# Patient Record
Sex: MALE | Race: WHITE | NOT HISPANIC OR LATINO | Employment: OTHER | URBAN - METROPOLITAN AREA
[De-identification: names, ages, dates, MRNs, and addresses within clinical notes are randomized per-mention and may not be internally consistent; named-entity substitution may affect disease eponyms.]

---

## 2019-05-16 ENCOUNTER — APPOINTMENT (EMERGENCY)
Dept: RADIOLOGY | Facility: HOSPITAL | Age: 64
DRG: 247 | End: 2019-05-16
Payer: COMMERCIAL

## 2019-05-16 ENCOUNTER — HOSPITAL ENCOUNTER (INPATIENT)
Facility: HOSPITAL | Age: 64
LOS: 2 days | Discharge: HOME | DRG: 247 | End: 2019-05-19
Attending: EMERGENCY MEDICINE | Admitting: ANESTHESIOLOGY
Payer: COMMERCIAL

## 2019-05-16 ENCOUNTER — APPOINTMENT (EMERGENCY)
Dept: INTERVENTIONAL RADIOLOGY/VASCULAR | Facility: HOSPITAL | Age: 64
DRG: 247 | End: 2019-05-16
Attending: EMERGENCY MEDICINE
Payer: COMMERCIAL

## 2019-05-16 DIAGNOSIS — I21.11 ST ELEVATION MYOCARDIAL INFARCTION INVOLVING RIGHT CORONARY ARTERY (HCC): ICD-10-CM

## 2019-05-16 DIAGNOSIS — I21.3 STEMI (ST ELEVATION MYOCARDIAL INFARCTION) (HCC): Primary | ICD-10-CM

## 2019-05-16 LAB
ALBUMIN SERPL BCP-MCNC: 4.5 G/DL (ref 3.5–5)
ALP SERPL-CCNC: 78 U/L (ref 46–116)
ALT SERPL W P-5'-P-CCNC: 66 U/L (ref 12–78)
ANION GAP SERPL CALCULATED.3IONS-SCNC: 7 MMOL/L (ref 4–13)
ANION GAP SERPL CALCULATED.3IONS-SCNC: 8 MMOL/L (ref 4–13)
APTT PPP: 30 SECONDS (ref 26–38)
AST SERPL W P-5'-P-CCNC: 45 U/L (ref 5–45)
ATRIAL RATE: 75 BPM
BASOPHILS # BLD AUTO: 0.04 THOUSANDS/ΜL (ref 0–0.1)
BASOPHILS NFR BLD AUTO: 0 % (ref 0–1)
BILIRUB DIRECT SERPL-MCNC: 0.02 MG/DL (ref 0–0.2)
BILIRUB SERPL-MCNC: 0.5 MG/DL (ref 0.2–1)
BUN SERPL-MCNC: 19 MG/DL (ref 5–25)
BUN SERPL-MCNC: 24 MG/DL (ref 5–25)
CALCIUM SERPL-MCNC: 11.1 MG/DL (ref 8.3–10.1)
CALCIUM SERPL-MCNC: 9.7 MG/DL (ref 8.3–10.1)
CHLORIDE SERPL-SCNC: 104 MMOL/L (ref 100–108)
CHLORIDE SERPL-SCNC: 109 MMOL/L (ref 100–108)
CO2 SERPL-SCNC: 27 MMOL/L (ref 21–32)
CO2 SERPL-SCNC: 31 MMOL/L (ref 21–32)
CREAT SERPL-MCNC: 1.12 MG/DL (ref 0.6–1.3)
CREAT SERPL-MCNC: 1.4 MG/DL (ref 0.6–1.3)
EOSINOPHIL # BLD AUTO: 0.08 THOUSAND/ΜL (ref 0–0.61)
EOSINOPHIL NFR BLD AUTO: 1 % (ref 0–6)
ERYTHROCYTE [DISTWIDTH] IN BLOOD BY AUTOMATED COUNT: 13.2 % (ref 11.6–15.1)
GFR SERPL CREATININE-BSD FRML MDRD: 52 ML/MIN/1.73SQ M
GFR SERPL CREATININE-BSD FRML MDRD: 69 ML/MIN/1.73SQ M
GLUCOSE P FAST SERPL-MCNC: 106 MG/DL (ref 65–99)
GLUCOSE SERPL-MCNC: 106 MG/DL (ref 65–140)
GLUCOSE SERPL-MCNC: 133 MG/DL (ref 65–140)
HCT VFR BLD AUTO: 47.8 % (ref 36.5–49.3)
HGB BLD-MCNC: 15.9 G/DL (ref 12–17)
IMM GRANULOCYTES # BLD AUTO: 0.05 THOUSAND/UL (ref 0–0.2)
IMM GRANULOCYTES NFR BLD AUTO: 1 % (ref 0–2)
INR PPP: 0.92 (ref 0.86–1.17)
KCT BLD-ACNC: 195 SEC (ref 89–137)
KCT BLD-ACNC: 248 SEC (ref 89–137)
LYMPHOCYTES # BLD AUTO: 1.55 THOUSANDS/ΜL (ref 0.6–4.47)
LYMPHOCYTES NFR BLD AUTO: 14 % (ref 14–44)
MAGNESIUM SERPL-MCNC: 2.1 MG/DL (ref 1.6–2.6)
MCH RBC QN AUTO: 31.9 PG (ref 26.8–34.3)
MCHC RBC AUTO-ENTMCNC: 33.3 G/DL (ref 31.4–37.4)
MCV RBC AUTO: 96 FL (ref 82–98)
MONOCYTES # BLD AUTO: 0.8 THOUSAND/ΜL (ref 0.17–1.22)
MONOCYTES NFR BLD AUTO: 7 % (ref 4–12)
NEUTROPHILS # BLD AUTO: 8.4 THOUSANDS/ΜL (ref 1.85–7.62)
NEUTS SEG NFR BLD AUTO: 77 % (ref 43–75)
NRBC BLD AUTO-RTO: 0 /100 WBCS
P AXIS: 59 DEGREES
PLATELET # BLD AUTO: 235 THOUSANDS/UL (ref 149–390)
PLATELET # BLD AUTO: 304 THOUSANDS/UL (ref 149–390)
PMV BLD AUTO: 8.7 FL (ref 8.9–12.7)
PMV BLD AUTO: 8.8 FL (ref 8.9–12.7)
POTASSIUM SERPL-SCNC: 4.4 MMOL/L (ref 3.5–5.3)
POTASSIUM SERPL-SCNC: 6 MMOL/L (ref 3.5–5.3)
PR INTERVAL: 152 MS
PROT SERPL-MCNC: 8.3 G/DL (ref 6.4–8.2)
PROTHROMBIN TIME: 12.3 SECONDS (ref 11.8–14.2)
QRS AXIS: 96 DEGREES
QRSD INTERVAL: 86 MS
QT INTERVAL: 334 MS
QTC INTERVAL: 372 MS
RBC # BLD AUTO: 4.98 MILLION/UL (ref 3.88–5.62)
SODIUM SERPL-SCNC: 143 MMOL/L (ref 136–145)
SODIUM SERPL-SCNC: 143 MMOL/L (ref 136–145)
SPECIMEN SOURCE: ABNORMAL
SPECIMEN SOURCE: ABNORMAL
T WAVE AXIS: 97 DEGREES
TROPONIN I SERPL-MCNC: 0.25 NG/ML
VENTRICULAR RATE: 75 BPM
WBC # BLD AUTO: 10.92 THOUSAND/UL (ref 4.31–10.16)

## 2019-05-16 PROCEDURE — 96372 THER/PROPH/DIAG INJ SC/IM: CPT

## 2019-05-16 PROCEDURE — C1769 GUIDE WIRE: HCPCS | Performed by: EMERGENCY MEDICINE

## 2019-05-16 PROCEDURE — C1874 STENT, COATED/COV W/DEL SYS: HCPCS

## 2019-05-16 PROCEDURE — B211YZZ FLUOROSCOPY OF MULTIPLE CORONARY ARTERIES USING OTHER CONTRAST: ICD-10-PCS | Performed by: INTERNAL MEDICINE

## 2019-05-16 PROCEDURE — 85049 AUTOMATED PLATELET COUNT: CPT | Performed by: NURSE PRACTITIONER

## 2019-05-16 PROCEDURE — 93458 L HRT ARTERY/VENTRICLE ANGIO: CPT | Performed by: INTERNAL MEDICINE

## 2019-05-16 PROCEDURE — 80048 BASIC METABOLIC PNL TOTAL CA: CPT | Performed by: EMERGENCY MEDICINE

## 2019-05-16 PROCEDURE — 85610 PROTHROMBIN TIME: CPT | Performed by: EMERGENCY MEDICINE

## 2019-05-16 PROCEDURE — 93010 ELECTROCARDIOGRAM REPORT: CPT | Performed by: INTERNAL MEDICINE

## 2019-05-16 PROCEDURE — 99213 OFFICE O/P EST LOW 20 MIN: CPT | Performed by: ANESTHESIOLOGY

## 2019-05-16 PROCEDURE — 027034Z DILATION OF CORONARY ARTERY, ONE ARTERY WITH DRUG-ELUTING INTRALUMINAL DEVICE, PERCUTANEOUS APPROACH: ICD-10-PCS | Performed by: INTERNAL MEDICINE

## 2019-05-16 PROCEDURE — C1894 INTRO/SHEATH, NON-LASER: HCPCS | Performed by: EMERGENCY MEDICINE

## 2019-05-16 PROCEDURE — 93458 L HRT ARTERY/VENTRICLE ANGIO: CPT | Performed by: EMERGENCY MEDICINE

## 2019-05-16 PROCEDURE — C9606 PERC D-E COR REVASC W AMI S: HCPCS | Performed by: EMERGENCY MEDICINE

## 2019-05-16 PROCEDURE — C1725 CATH, TRANSLUMIN NON-LASER: HCPCS | Performed by: EMERGENCY MEDICINE

## 2019-05-16 PROCEDURE — 99285 EMERGENCY DEPT VISIT HI MDM: CPT | Performed by: EMERGENCY MEDICINE

## 2019-05-16 PROCEDURE — 85025 COMPLETE CBC W/AUTO DIFF WBC: CPT | Performed by: EMERGENCY MEDICINE

## 2019-05-16 PROCEDURE — 93005 ELECTROCARDIOGRAM TRACING: CPT

## 2019-05-16 PROCEDURE — 99222 1ST HOSP IP/OBS MODERATE 55: CPT | Performed by: INTERNAL MEDICINE

## 2019-05-16 PROCEDURE — 83735 ASSAY OF MAGNESIUM: CPT | Performed by: NURSE PRACTITIONER

## 2019-05-16 PROCEDURE — 84484 ASSAY OF TROPONIN QUANT: CPT | Performed by: EMERGENCY MEDICINE

## 2019-05-16 PROCEDURE — 99285 EMERGENCY DEPT VISIT HI MDM: CPT

## 2019-05-16 PROCEDURE — 36415 COLL VENOUS BLD VENIPUNCTURE: CPT | Performed by: EMERGENCY MEDICINE

## 2019-05-16 PROCEDURE — C1887 CATHETER, GUIDING: HCPCS | Performed by: EMERGENCY MEDICINE

## 2019-05-16 PROCEDURE — 4A023N7 MEASUREMENT OF CARDIAC SAMPLING AND PRESSURE, LEFT HEART, PERCUTANEOUS APPROACH: ICD-10-PCS | Performed by: INTERNAL MEDICINE

## 2019-05-16 PROCEDURE — 80048 BASIC METABOLIC PNL TOTAL CA: CPT | Performed by: NURSE PRACTITIONER

## 2019-05-16 PROCEDURE — 92941 PRQ TRLML REVSC TOT OCCL AMI: CPT | Performed by: INTERNAL MEDICINE

## 2019-05-16 PROCEDURE — 80076 HEPATIC FUNCTION PANEL: CPT | Performed by: EMERGENCY MEDICINE

## 2019-05-16 PROCEDURE — 85347 COAGULATION TIME ACTIVATED: CPT

## 2019-05-16 PROCEDURE — 85730 THROMBOPLASTIN TIME PARTIAL: CPT | Performed by: EMERGENCY MEDICINE

## 2019-05-16 RX ORDER — HEPARIN SODIUM 5000 [USP'U]/ML
5000 INJECTION, SOLUTION INTRAVENOUS; SUBCUTANEOUS ONCE
Status: COMPLETED | OUTPATIENT
Start: 2019-05-16 | End: 2019-05-16

## 2019-05-16 RX ORDER — ACETAMINOPHEN 325 MG/1
650 TABLET ORAL EVERY 4 HOURS PRN
Status: DISCONTINUED | OUTPATIENT
Start: 2019-05-16 | End: 2019-05-19 | Stop reason: HOSPADM

## 2019-05-16 RX ORDER — HEPARIN SODIUM 1000 [USP'U]/ML
INJECTION, SOLUTION INTRAVENOUS; SUBCUTANEOUS CODE/TRAUMA/SEDATION MEDICATION
Status: COMPLETED | OUTPATIENT
Start: 2019-05-16 | End: 2019-05-16

## 2019-05-16 RX ORDER — ONDANSETRON 2 MG/ML
4 INJECTION INTRAMUSCULAR; INTRAVENOUS EVERY 6 HOURS PRN
Status: DISCONTINUED | OUTPATIENT
Start: 2019-05-16 | End: 2019-05-19 | Stop reason: HOSPADM

## 2019-05-16 RX ORDER — SODIUM CHLORIDE 9 MG/ML
125 INJECTION, SOLUTION INTRAVENOUS CONTINUOUS
Status: DISPENSED | OUTPATIENT
Start: 2019-05-16 | End: 2019-05-16

## 2019-05-16 RX ORDER — LIDOCAINE HYDROCHLORIDE 10 MG/ML
INJECTION, SOLUTION INFILTRATION; PERINEURAL CODE/TRAUMA/SEDATION MEDICATION
Status: COMPLETED | OUTPATIENT
Start: 2019-05-16 | End: 2019-05-16

## 2019-05-16 RX ORDER — ATORVASTATIN CALCIUM 40 MG/1
80 TABLET, FILM COATED ORAL EVERY EVENING
Status: DISCONTINUED | OUTPATIENT
Start: 2019-05-16 | End: 2019-05-19

## 2019-05-16 RX ORDER — NITROGLYCERIN 20 MG/100ML
INJECTION INTRAVENOUS CODE/TRAUMA/SEDATION MEDICATION
Status: COMPLETED | OUTPATIENT
Start: 2019-05-16 | End: 2019-05-16

## 2019-05-16 RX ORDER — MAGNESIUM HYDROXIDE/ALUMINUM HYDROXICE/SIMETHICONE 120; 1200; 1200 MG/30ML; MG/30ML; MG/30ML
30 SUSPENSION ORAL EVERY 6 HOURS PRN
Status: DISCONTINUED | OUTPATIENT
Start: 2019-05-16 | End: 2019-05-19 | Stop reason: HOSPADM

## 2019-05-16 RX ORDER — CHLORHEXIDINE GLUCONATE 0.12 MG/ML
15 RINSE ORAL EVERY 12 HOURS SCHEDULED
Status: DISCONTINUED | OUTPATIENT
Start: 2019-05-16 | End: 2019-05-19 | Stop reason: HOSPADM

## 2019-05-16 RX ORDER — DOCUSATE SODIUM 100 MG/1
100 CAPSULE, LIQUID FILLED ORAL 2 TIMES DAILY
Status: DISCONTINUED | OUTPATIENT
Start: 2019-05-16 | End: 2019-05-19 | Stop reason: HOSPADM

## 2019-05-16 RX ORDER — VERAPAMIL HCL 2.5 MG/ML
AMPUL (ML) INTRAVENOUS CODE/TRAUMA/SEDATION MEDICATION
Status: COMPLETED | OUTPATIENT
Start: 2019-05-16 | End: 2019-05-16

## 2019-05-16 RX ORDER — PANTOPRAZOLE SODIUM 40 MG/1
40 TABLET, DELAYED RELEASE ORAL
Status: DISCONTINUED | OUTPATIENT
Start: 2019-05-17 | End: 2019-05-19 | Stop reason: HOSPADM

## 2019-05-16 RX ORDER — ASPIRIN 81 MG/1
81 TABLET, CHEWABLE ORAL DAILY
Status: DISCONTINUED | OUTPATIENT
Start: 2019-05-17 | End: 2019-05-16

## 2019-05-16 RX ORDER — ASPIRIN 81 MG/1
81 TABLET ORAL DAILY
Status: DISCONTINUED | OUTPATIENT
Start: 2019-05-17 | End: 2019-05-19 | Stop reason: HOSPADM

## 2019-05-16 RX ORDER — SIMETHICONE 80 MG
80 TABLET,CHEWABLE ORAL 4 TIMES DAILY PRN
Status: DISCONTINUED | OUTPATIENT
Start: 2019-05-16 | End: 2019-05-19 | Stop reason: HOSPADM

## 2019-05-16 RX ORDER — ATROPINE SULFATE 0.1 MG/ML
INJECTION, SOLUTION ENDOTRACHEAL; INTRAMUSCULAR; INTRAVENOUS; SUBCUTANEOUS CODE/TRAUMA/SEDATION MEDICATION
Status: COMPLETED | OUTPATIENT
Start: 2019-05-16 | End: 2019-05-16

## 2019-05-16 RX ORDER — HEPARIN SODIUM 5000 [USP'U]/ML
5000 INJECTION, SOLUTION INTRAVENOUS; SUBCUTANEOUS EVERY 8 HOURS SCHEDULED
Status: DISCONTINUED | OUTPATIENT
Start: 2019-05-16 | End: 2019-05-16

## 2019-05-16 RX ADMIN — NITROGLYCERIN 200 MCG: 20 INJECTION INTRAVENOUS at 15:30

## 2019-05-16 RX ADMIN — LIDOCAINE HYDROCHLORIDE 2 ML: 10 INJECTION, SOLUTION INFILTRATION; PERINEURAL at 15:27

## 2019-05-16 RX ADMIN — METOPROLOL TARTRATE 25 MG: 25 TABLET ORAL at 21:56

## 2019-05-16 RX ADMIN — ATORVASTATIN CALCIUM 80 MG: 40 TABLET, FILM COATED ORAL at 21:58

## 2019-05-16 RX ADMIN — IODIXANOL 100 ML: 320 INJECTION, SOLUTION INTRAVASCULAR at 15:59

## 2019-05-16 RX ADMIN — DOCUSATE SODIUM 100 MG: 100 CAPSULE, LIQUID FILLED ORAL at 21:58

## 2019-05-16 RX ADMIN — HEPARIN SODIUM 2000 UNITS: 1000 INJECTION INTRAVENOUS; SUBCUTANEOUS at 15:53

## 2019-05-16 RX ADMIN — TICAGRELOR 180 MG: 90 TABLET ORAL at 15:11

## 2019-05-16 RX ADMIN — HEPARIN SODIUM 5000 UNITS: 5000 INJECTION INTRAVENOUS; SUBCUTANEOUS at 15:15

## 2019-05-16 RX ADMIN — CHLORHEXIDINE GLUCONATE 0.12% ORAL RINSE 15 ML: 1.2 LIQUID ORAL at 21:57

## 2019-05-16 RX ADMIN — VERAPAMIL HYDROCHLORIDE 2.5 MG: 2.5 INJECTION, SOLUTION INTRAVENOUS at 15:30

## 2019-05-16 RX ADMIN — ATROPINE SULFATE 0.5 MG: 0.1 INJECTION, SOLUTION ENDOTRACHEAL; INTRAMUSCULAR; INTRAVENOUS; SUBCUTANEOUS at 15:36

## 2019-05-16 RX ADMIN — HEPARIN SODIUM 6000 UNITS: 1000 INJECTION INTRAVENOUS; SUBCUTANEOUS at 15:34

## 2019-05-17 ENCOUNTER — APPOINTMENT (OUTPATIENT)
Dept: NON INVASIVE DIAGNOSTICS | Facility: HOSPITAL | Age: 64
DRG: 247 | End: 2019-05-17
Payer: COMMERCIAL

## 2019-05-17 LAB
ALBUMIN SERPL BCP-MCNC: 4 G/DL (ref 3.5–5)
ALP SERPL-CCNC: 76 U/L (ref 46–116)
ALT SERPL W P-5'-P-CCNC: 72 U/L (ref 12–78)
ANION GAP SERPL CALCULATED.3IONS-SCNC: 11 MMOL/L (ref 4–13)
AST SERPL W P-5'-P-CCNC: 147 U/L (ref 5–45)
ATRIAL RATE: 56 BPM
BASOPHILS # BLD AUTO: 0.05 THOUSANDS/ΜL (ref 0–0.1)
BASOPHILS NFR BLD AUTO: 1 % (ref 0–1)
BILIRUB SERPL-MCNC: 0.8 MG/DL (ref 0.2–1)
BUN SERPL-MCNC: 16 MG/DL (ref 5–25)
CALCIUM SERPL-MCNC: 9.5 MG/DL (ref 8.3–10.1)
CHLORIDE SERPL-SCNC: 105 MMOL/L (ref 100–108)
CO2 SERPL-SCNC: 26 MMOL/L (ref 21–32)
CREAT SERPL-MCNC: 1.24 MG/DL (ref 0.6–1.3)
EOSINOPHIL # BLD AUTO: 0.11 THOUSAND/ΜL (ref 0–0.61)
EOSINOPHIL NFR BLD AUTO: 1 % (ref 0–6)
ERYTHROCYTE [DISTWIDTH] IN BLOOD BY AUTOMATED COUNT: 13.5 % (ref 11.6–15.1)
GFR SERPL CREATININE-BSD FRML MDRD: 61 ML/MIN/1.73SQ M
GLUCOSE SERPL-MCNC: 102 MG/DL (ref 65–140)
HCT VFR BLD AUTO: 45.8 % (ref 36.5–49.3)
HGB BLD-MCNC: 15 G/DL (ref 12–17)
IMM GRANULOCYTES # BLD AUTO: 0.06 THOUSAND/UL (ref 0–0.2)
IMM GRANULOCYTES NFR BLD AUTO: 1 % (ref 0–2)
LYMPHOCYTES # BLD AUTO: 1.3 THOUSANDS/ΜL (ref 0.6–4.47)
LYMPHOCYTES NFR BLD AUTO: 12 % (ref 14–44)
MAGNESIUM SERPL-MCNC: 2.2 MG/DL (ref 1.6–2.6)
MCH RBC QN AUTO: 31.7 PG (ref 26.8–34.3)
MCHC RBC AUTO-ENTMCNC: 32.8 G/DL (ref 31.4–37.4)
MCV RBC AUTO: 97 FL (ref 82–98)
MONOCYTES # BLD AUTO: 1.03 THOUSAND/ΜL (ref 0.17–1.22)
MONOCYTES NFR BLD AUTO: 10 % (ref 4–12)
NEUTROPHILS # BLD AUTO: 8.25 THOUSANDS/ΜL (ref 1.85–7.62)
NEUTS SEG NFR BLD AUTO: 75 % (ref 43–75)
NRBC BLD AUTO-RTO: 0 /100 WBCS
P AXIS: 45 DEGREES
PHOSPHATE SERPL-MCNC: 2.7 MG/DL (ref 2.3–4.1)
PLATELET # BLD AUTO: 263 THOUSANDS/UL (ref 149–390)
PMV BLD AUTO: 8.9 FL (ref 8.9–12.7)
POTASSIUM SERPL-SCNC: 4.4 MMOL/L (ref 3.5–5.3)
PR INTERVAL: 160 MS
PROT SERPL-MCNC: 7.3 G/DL (ref 6.4–8.2)
QRS AXIS: 92 DEGREES
QRSD INTERVAL: 86 MS
QT INTERVAL: 416 MS
QTC INTERVAL: 401 MS
RBC # BLD AUTO: 4.73 MILLION/UL (ref 3.88–5.62)
SODIUM SERPL-SCNC: 142 MMOL/L (ref 136–145)
T WAVE AXIS: -23 DEGREES
VENTRICULAR RATE: 56 BPM
WBC # BLD AUTO: 10.8 THOUSAND/UL (ref 4.31–10.16)

## 2019-05-17 PROCEDURE — 93306 TTE W/DOPPLER COMPLETE: CPT | Performed by: INTERNAL MEDICINE

## 2019-05-17 PROCEDURE — G8979 MOBILITY GOAL STATUS: HCPCS

## 2019-05-17 PROCEDURE — 99232 SBSQ HOSP IP/OBS MODERATE 35: CPT | Performed by: INTERNAL MEDICINE

## 2019-05-17 PROCEDURE — 83735 ASSAY OF MAGNESIUM: CPT | Performed by: NURSE PRACTITIONER

## 2019-05-17 PROCEDURE — G8978 MOBILITY CURRENT STATUS: HCPCS

## 2019-05-17 PROCEDURE — 80053 COMPREHEN METABOLIC PANEL: CPT | Performed by: NURSE PRACTITIONER

## 2019-05-17 PROCEDURE — 93010 ELECTROCARDIOGRAM REPORT: CPT | Performed by: INTERNAL MEDICINE

## 2019-05-17 PROCEDURE — 93306 TTE W/DOPPLER COMPLETE: CPT

## 2019-05-17 PROCEDURE — 97161 PT EVAL LOW COMPLEX 20 MIN: CPT

## 2019-05-17 PROCEDURE — 85025 COMPLETE CBC W/AUTO DIFF WBC: CPT | Performed by: NURSE PRACTITIONER

## 2019-05-17 PROCEDURE — 84100 ASSAY OF PHOSPHORUS: CPT | Performed by: NURSE PRACTITIONER

## 2019-05-17 PROCEDURE — G8980 MOBILITY D/C STATUS: HCPCS

## 2019-05-17 RX ORDER — FUROSEMIDE 10 MG/ML
40 INJECTION INTRAMUSCULAR; INTRAVENOUS ONCE
Status: COMPLETED | OUTPATIENT
Start: 2019-05-17 | End: 2019-05-17

## 2019-05-17 RX ADMIN — CHLORHEXIDINE GLUCONATE 0.12% ORAL RINSE 15 ML: 1.2 LIQUID ORAL at 08:01

## 2019-05-17 RX ADMIN — METOPROLOL TARTRATE 25 MG: 25 TABLET ORAL at 21:13

## 2019-05-17 RX ADMIN — TICAGRELOR 90 MG: 90 TABLET ORAL at 17:12

## 2019-05-17 RX ADMIN — TICAGRELOR 90 MG: 90 TABLET ORAL at 08:01

## 2019-05-17 RX ADMIN — ASPIRIN 81 MG: 81 TABLET, COATED ORAL at 08:01

## 2019-05-17 RX ADMIN — FUROSEMIDE 40 MG: 10 INJECTION, SOLUTION INTRAMUSCULAR; INTRAVENOUS at 14:54

## 2019-05-17 RX ADMIN — METOPROLOL TARTRATE 25 MG: 25 TABLET ORAL at 08:01

## 2019-05-17 RX ADMIN — PANTOPRAZOLE SODIUM 40 MG: 40 TABLET, DELAYED RELEASE ORAL at 06:26

## 2019-05-17 RX ADMIN — ATORVASTATIN CALCIUM 80 MG: 40 TABLET, FILM COATED ORAL at 17:12

## 2019-05-17 RX ADMIN — CHLORHEXIDINE GLUCONATE 0.12% ORAL RINSE 15 ML: 1.2 LIQUID ORAL at 21:13

## 2019-05-18 PROBLEM — E78.2 MIXED HYPERLIPIDEMIA: Status: ACTIVE | Noted: 2019-05-18

## 2019-05-18 PROBLEM — Z72.0 TOBACCO USE: Status: ACTIVE | Noted: 2019-05-18

## 2019-05-18 PROBLEM — R79.89 ELEVATED SERUM CREATININE: Status: ACTIVE | Noted: 2019-05-18

## 2019-05-18 PROBLEM — R74.01 TRANSAMINITIS: Status: ACTIVE | Noted: 2019-05-18

## 2019-05-18 PROBLEM — I21.11 ST ELEVATION MYOCARDIAL INFARCTION INVOLVING RIGHT CORONARY ARTERY (HCC): Status: ACTIVE | Noted: 2019-05-18

## 2019-05-18 LAB
ANION GAP SERPL CALCULATED.3IONS-SCNC: 9 MMOL/L (ref 4–13)
BASOPHILS # BLD AUTO: 0.03 THOUSANDS/ΜL (ref 0–0.1)
BASOPHILS NFR BLD AUTO: 0 % (ref 0–1)
BUN SERPL-MCNC: 21 MG/DL (ref 5–25)
CALCIUM SERPL-MCNC: 9.2 MG/DL (ref 8.3–10.1)
CHLORIDE SERPL-SCNC: 104 MMOL/L (ref 100–108)
CHOLEST SERPL-MCNC: 157 MG/DL (ref 50–200)
CO2 SERPL-SCNC: 28 MMOL/L (ref 21–32)
CREAT SERPL-MCNC: 1.35 MG/DL (ref 0.6–1.3)
EOSINOPHIL # BLD AUTO: 0.16 THOUSAND/ΜL (ref 0–0.61)
EOSINOPHIL NFR BLD AUTO: 2 % (ref 0–6)
ERYTHROCYTE [DISTWIDTH] IN BLOOD BY AUTOMATED COUNT: 13.4 % (ref 11.6–15.1)
GFR SERPL CREATININE-BSD FRML MDRD: 55 ML/MIN/1.73SQ M
GLUCOSE SERPL-MCNC: 101 MG/DL (ref 65–140)
HCT VFR BLD AUTO: 45 % (ref 36.5–49.3)
HDLC SERPL-MCNC: 30 MG/DL (ref 40–60)
HGB BLD-MCNC: 14.8 G/DL (ref 12–17)
IMM GRANULOCYTES # BLD AUTO: 0.05 THOUSAND/UL (ref 0–0.2)
IMM GRANULOCYTES NFR BLD AUTO: 1 % (ref 0–2)
LDLC SERPL CALC-MCNC: 96 MG/DL (ref 0–100)
LYMPHOCYTES # BLD AUTO: 1.46 THOUSANDS/ΜL (ref 0.6–4.47)
LYMPHOCYTES NFR BLD AUTO: 16 % (ref 14–44)
MAGNESIUM SERPL-MCNC: 2.4 MG/DL (ref 1.6–2.6)
MCH RBC QN AUTO: 31.8 PG (ref 26.8–34.3)
MCHC RBC AUTO-ENTMCNC: 32.9 G/DL (ref 31.4–37.4)
MCV RBC AUTO: 97 FL (ref 82–98)
MONOCYTES # BLD AUTO: 1.17 THOUSAND/ΜL (ref 0.17–1.22)
MONOCYTES NFR BLD AUTO: 13 % (ref 4–12)
NEUTROPHILS # BLD AUTO: 6.16 THOUSANDS/ΜL (ref 1.85–7.62)
NEUTS SEG NFR BLD AUTO: 68 % (ref 43–75)
NONHDLC SERPL-MCNC: 127 MG/DL
NRBC BLD AUTO-RTO: 0 /100 WBCS
PLATELET # BLD AUTO: 250 THOUSANDS/UL (ref 149–390)
PMV BLD AUTO: 8.9 FL (ref 8.9–12.7)
POTASSIUM SERPL-SCNC: 4.2 MMOL/L (ref 3.5–5.3)
RBC # BLD AUTO: 4.66 MILLION/UL (ref 3.88–5.62)
SODIUM SERPL-SCNC: 141 MMOL/L (ref 136–145)
TRIGL SERPL-MCNC: 156 MG/DL
WBC # BLD AUTO: 9.03 THOUSAND/UL (ref 4.31–10.16)

## 2019-05-18 PROCEDURE — 80048 BASIC METABOLIC PNL TOTAL CA: CPT | Performed by: PHYSICIAN ASSISTANT

## 2019-05-18 PROCEDURE — 87340 HEPATITIS B SURFACE AG IA: CPT | Performed by: FAMILY MEDICINE

## 2019-05-18 PROCEDURE — 85025 COMPLETE CBC W/AUTO DIFF WBC: CPT | Performed by: PHYSICIAN ASSISTANT

## 2019-05-18 PROCEDURE — 83735 ASSAY OF MAGNESIUM: CPT | Performed by: PHYSICIAN ASSISTANT

## 2019-05-18 PROCEDURE — 99232 SBSQ HOSP IP/OBS MODERATE 35: CPT | Performed by: FAMILY MEDICINE

## 2019-05-18 PROCEDURE — 86708 HEPATITIS A ANTIBODY: CPT | Performed by: FAMILY MEDICINE

## 2019-05-18 PROCEDURE — 80061 LIPID PANEL: CPT | Performed by: PHYSICIAN ASSISTANT

## 2019-05-18 PROCEDURE — 99232 SBSQ HOSP IP/OBS MODERATE 35: CPT | Performed by: INTERNAL MEDICINE

## 2019-05-18 PROCEDURE — 86803 HEPATITIS C AB TEST: CPT | Performed by: FAMILY MEDICINE

## 2019-05-18 RX ADMIN — TICAGRELOR 90 MG: 90 TABLET ORAL at 17:08

## 2019-05-18 RX ADMIN — CHLORHEXIDINE GLUCONATE 0.12% ORAL RINSE 15 ML: 1.2 LIQUID ORAL at 21:24

## 2019-05-18 RX ADMIN — METOPROLOL TARTRATE 25 MG: 25 TABLET ORAL at 21:24

## 2019-05-18 RX ADMIN — ASPIRIN 81 MG: 81 TABLET, COATED ORAL at 08:24

## 2019-05-18 RX ADMIN — PANTOPRAZOLE SODIUM 40 MG: 40 TABLET, DELAYED RELEASE ORAL at 05:24

## 2019-05-18 RX ADMIN — ATORVASTATIN CALCIUM 80 MG: 40 TABLET, FILM COATED ORAL at 17:08

## 2019-05-18 RX ADMIN — METOPROLOL TARTRATE 25 MG: 25 TABLET ORAL at 08:24

## 2019-05-18 RX ADMIN — TICAGRELOR 90 MG: 90 TABLET ORAL at 08:24

## 2019-05-19 VITALS
BODY MASS INDEX: 25.88 KG/M2 | WEIGHT: 180.78 LBS | HEART RATE: 63 BPM | DIASTOLIC BLOOD PRESSURE: 72 MMHG | RESPIRATION RATE: 18 BRPM | TEMPERATURE: 97.7 F | HEIGHT: 70 IN | OXYGEN SATURATION: 94 % | SYSTOLIC BLOOD PRESSURE: 110 MMHG

## 2019-05-19 PROBLEM — R79.89 ELEVATED SERUM CREATININE: Status: RESOLVED | Noted: 2019-05-18 | Resolved: 2019-05-19

## 2019-05-19 LAB
ALBUMIN SERPL BCP-MCNC: 3.8 G/DL (ref 3.5–5)
ALP SERPL-CCNC: 82 U/L (ref 46–116)
ALT SERPL W P-5'-P-CCNC: 48 U/L (ref 12–78)
ANION GAP SERPL CALCULATED.3IONS-SCNC: 7 MMOL/L (ref 4–13)
AST SERPL W P-5'-P-CCNC: 37 U/L (ref 5–45)
BASOPHILS # BLD AUTO: 0.04 THOUSANDS/ΜL (ref 0–0.1)
BASOPHILS NFR BLD AUTO: 1 % (ref 0–1)
BILIRUB SERPL-MCNC: 1 MG/DL (ref 0.2–1)
BUN SERPL-MCNC: 26 MG/DL (ref 5–25)
CALCIUM SERPL-MCNC: 9.1 MG/DL (ref 8.3–10.1)
CHLORIDE SERPL-SCNC: 103 MMOL/L (ref 100–108)
CO2 SERPL-SCNC: 29 MMOL/L (ref 21–32)
CREAT SERPL-MCNC: 1.28 MG/DL (ref 0.6–1.3)
EOSINOPHIL # BLD AUTO: 0.2 THOUSAND/ΜL (ref 0–0.61)
EOSINOPHIL NFR BLD AUTO: 2 % (ref 0–6)
ERYTHROCYTE [DISTWIDTH] IN BLOOD BY AUTOMATED COUNT: 13.1 % (ref 11.6–15.1)
EST. AVERAGE GLUCOSE BLD GHB EST-MCNC: 120 MG/DL
GFR SERPL CREATININE-BSD FRML MDRD: 58 ML/MIN/1.73SQ M
GLUCOSE SERPL-MCNC: 108 MG/DL (ref 65–140)
HAV AB SER QL IA: NORMAL
HBA1C MFR BLD: 5.8 % (ref 4.2–6.3)
HBV SURFACE AG SER QL: NORMAL
HCT VFR BLD AUTO: 44.9 % (ref 36.5–49.3)
HCV AB SER QL: NORMAL
HGB BLD-MCNC: 15.1 G/DL (ref 12–17)
IMM GRANULOCYTES # BLD AUTO: 0.02 THOUSAND/UL (ref 0–0.2)
IMM GRANULOCYTES NFR BLD AUTO: 0 % (ref 0–2)
LYMPHOCYTES # BLD AUTO: 1.51 THOUSANDS/ΜL (ref 0.6–4.47)
LYMPHOCYTES NFR BLD AUTO: 18 % (ref 14–44)
MCH RBC QN AUTO: 32.1 PG (ref 26.8–34.3)
MCHC RBC AUTO-ENTMCNC: 33.6 G/DL (ref 31.4–37.4)
MCV RBC AUTO: 96 FL (ref 82–98)
MONOCYTES # BLD AUTO: 1.13 THOUSAND/ΜL (ref 0.17–1.22)
MONOCYTES NFR BLD AUTO: 14 % (ref 4–12)
NEUTROPHILS # BLD AUTO: 5.48 THOUSANDS/ΜL (ref 1.85–7.62)
NEUTS SEG NFR BLD AUTO: 65 % (ref 43–75)
NRBC BLD AUTO-RTO: 0 /100 WBCS
PLATELET # BLD AUTO: 249 THOUSANDS/UL (ref 149–390)
PMV BLD AUTO: 8.8 FL (ref 8.9–12.7)
POTASSIUM SERPL-SCNC: 3.9 MMOL/L (ref 3.5–5.3)
PROT SERPL-MCNC: 7.2 G/DL (ref 6.4–8.2)
RBC # BLD AUTO: 4.7 MILLION/UL (ref 3.88–5.62)
SODIUM SERPL-SCNC: 139 MMOL/L (ref 136–145)
WBC # BLD AUTO: 8.38 THOUSAND/UL (ref 4.31–10.16)

## 2019-05-19 PROCEDURE — 80053 COMPREHEN METABOLIC PANEL: CPT | Performed by: FAMILY MEDICINE

## 2019-05-19 PROCEDURE — 99232 SBSQ HOSP IP/OBS MODERATE 35: CPT | Performed by: INTERNAL MEDICINE

## 2019-05-19 PROCEDURE — 85025 COMPLETE CBC W/AUTO DIFF WBC: CPT | Performed by: FAMILY MEDICINE

## 2019-05-19 PROCEDURE — 99239 HOSP IP/OBS DSCHRG MGMT >30: CPT | Performed by: FAMILY MEDICINE

## 2019-05-19 PROCEDURE — 83036 HEMOGLOBIN GLYCOSYLATED A1C: CPT | Performed by: PHYSICIAN ASSISTANT

## 2019-05-19 RX ORDER — ASPIRIN 81 MG/1
81 TABLET ORAL DAILY
Qty: 30 TABLET | Refills: 0 | Status: SHIPPED | OUTPATIENT
Start: 2019-05-20

## 2019-05-19 RX ORDER — ATORVASTATIN CALCIUM 20 MG/1
20 TABLET, FILM COATED ORAL EVERY EVENING
Status: DISCONTINUED | OUTPATIENT
Start: 2019-05-19 | End: 2019-05-19 | Stop reason: HOSPADM

## 2019-05-19 RX ORDER — ATORVASTATIN CALCIUM 20 MG/1
20 TABLET, FILM COATED ORAL EVERY EVENING
Qty: 30 TABLET | Refills: 0 | Status: SHIPPED | OUTPATIENT
Start: 2019-05-19 | End: 2019-05-23 | Stop reason: SDUPTHER

## 2019-05-19 RX ADMIN — CHLORHEXIDINE GLUCONATE 0.12% ORAL RINSE 15 ML: 1.2 LIQUID ORAL at 08:50

## 2019-05-19 RX ADMIN — ASPIRIN 81 MG: 81 TABLET, COATED ORAL at 08:50

## 2019-05-19 RX ADMIN — METOPROLOL TARTRATE 25 MG: 25 TABLET ORAL at 08:50

## 2019-05-19 RX ADMIN — TICAGRELOR 90 MG: 90 TABLET ORAL at 08:50

## 2019-05-19 RX ADMIN — PANTOPRAZOLE SODIUM 40 MG: 40 TABLET, DELAYED RELEASE ORAL at 05:51

## 2019-05-21 ENCOUNTER — TELEPHONE (OUTPATIENT)
Dept: CARDIOLOGY CLINIC | Facility: CLINIC | Age: 64
End: 2019-05-21

## 2019-05-23 ENCOUNTER — OFFICE VISIT (OUTPATIENT)
Dept: CARDIOLOGY CLINIC | Facility: CLINIC | Age: 64
End: 2019-05-23
Payer: COMMERCIAL

## 2019-05-23 VITALS
BODY MASS INDEX: 26.63 KG/M2 | OXYGEN SATURATION: 98 % | HEIGHT: 70 IN | SYSTOLIC BLOOD PRESSURE: 128 MMHG | DIASTOLIC BLOOD PRESSURE: 80 MMHG | WEIGHT: 186 LBS | HEART RATE: 71 BPM

## 2019-05-23 DIAGNOSIS — I21.11 ST ELEVATION MYOCARDIAL INFARCTION INVOLVING RIGHT CORONARY ARTERY (HCC): Primary | ICD-10-CM

## 2019-05-23 DIAGNOSIS — Z72.0 TOBACCO USE: ICD-10-CM

## 2019-05-23 DIAGNOSIS — E78.2 MIXED HYPERLIPIDEMIA: ICD-10-CM

## 2019-05-23 PROCEDURE — 99213 OFFICE O/P EST LOW 20 MIN: CPT | Performed by: INTERNAL MEDICINE

## 2019-05-23 RX ORDER — ATORVASTATIN CALCIUM 40 MG/1
40 TABLET, FILM COATED ORAL EVERY EVENING
Qty: 90 TABLET | Refills: 3 | Status: SHIPPED | OUTPATIENT
Start: 2019-05-23 | End: 2020-05-27 | Stop reason: SDUPTHER

## 2019-06-04 ENCOUNTER — TELEPHONE (OUTPATIENT)
Dept: CARDIOLOGY CLINIC | Facility: CLINIC | Age: 64
End: 2019-06-04

## 2019-06-04 NOTE — TELEPHONE ENCOUNTER
SYLVIA FROM [facility] IS REQUESTING PT OFFICE NOTES, MED LIST, LIPID PANELS  PLEASE FAX [phone]   James Burgess

## 2019-06-14 DIAGNOSIS — I21.11 ST ELEVATION MYOCARDIAL INFARCTION INVOLVING RIGHT CORONARY ARTERY (HCC): ICD-10-CM

## 2019-07-12 DIAGNOSIS — I21.11 ST ELEVATION MYOCARDIAL INFARCTION INVOLVING RIGHT CORONARY ARTERY (HCC): ICD-10-CM

## 2019-09-18 ENCOUNTER — TELEPHONE (OUTPATIENT)
Dept: CARDIOLOGY CLINIC | Facility: CLINIC | Age: 64
End: 2019-09-18

## 2019-09-18 NOTE — TELEPHONE ENCOUNTER
It is possible that it could be due to medications  We can discuss it at the next visit      [facility]

## 2019-09-18 NOTE — TELEPHONE ENCOUNTER
Spoke with patient advised the below  Verbally understands will get labs done and follow-up at appointment

## 2019-09-18 NOTE — TELEPHONE ENCOUNTER
S/w pt and informed him on BW ordered at last office visit to be done prior to appt  Pt verbally understood and stated that he has been experiencing fatigue  Pt would like to know if that could be a side effect from medications?

## 2019-10-04 ENCOUNTER — APPOINTMENT (OUTPATIENT)
Dept: LAB | Facility: CLINIC | Age: 64
End: 2019-10-04
Payer: COMMERCIAL

## 2019-10-04 DIAGNOSIS — I21.11 ST ELEVATION MYOCARDIAL INFARCTION INVOLVING RIGHT CORONARY ARTERY (HCC): ICD-10-CM

## 2019-10-04 LAB
ALBUMIN SERPL BCP-MCNC: 4.3 G/DL (ref 3.5–5)
ALP SERPL-CCNC: 102 U/L (ref 46–116)
ALT SERPL W P-5'-P-CCNC: 27 U/L (ref 12–78)
ANION GAP SERPL CALCULATED.3IONS-SCNC: 5 MMOL/L (ref 4–13)
AST SERPL W P-5'-P-CCNC: 13 U/L (ref 5–45)
BILIRUB SERPL-MCNC: 0.38 MG/DL (ref 0.2–1)
BUN SERPL-MCNC: 21 MG/DL (ref 5–25)
CALCIUM SERPL-MCNC: 9.3 MG/DL (ref 8.3–10.1)
CHLORIDE SERPL-SCNC: 110 MMOL/L (ref 100–108)
CO2 SERPL-SCNC: 25 MMOL/L (ref 21–32)
CREAT SERPL-MCNC: 1.27 MG/DL (ref 0.6–1.3)
GFR SERPL CREATININE-BSD FRML MDRD: 58 ML/MIN/1.73SQ M
GLUCOSE P FAST SERPL-MCNC: 107 MG/DL (ref 65–99)
POTASSIUM SERPL-SCNC: 4.6 MMOL/L (ref 3.5–5.3)
PROT SERPL-MCNC: 7.8 G/DL (ref 6.4–8.2)
SODIUM SERPL-SCNC: 140 MMOL/L (ref 136–145)

## 2019-10-04 PROCEDURE — 80053 COMPREHEN METABOLIC PANEL: CPT

## 2019-10-04 PROCEDURE — 36415 COLL VENOUS BLD VENIPUNCTURE: CPT

## 2019-10-07 ENCOUNTER — TELEPHONE (OUTPATIENT)
Dept: CARDIOLOGY CLINIC | Facility: CLINIC | Age: 64
End: 2019-10-07

## 2019-10-07 NOTE — TELEPHONE ENCOUNTER
----- Message from Twyla Roldan DO sent at 10/5/2019  4:46 PM EDT -----  Please call the patient and inform him that his laboratory work is normal     [address]    ----- Message -----  From: Lab, Background User  Sent: 10/4/2019  10:59 AM EDT  To: Twyla Roldan DO

## 2019-10-24 ENCOUNTER — OFFICE VISIT (OUTPATIENT)
Dept: CARDIOLOGY CLINIC | Facility: CLINIC | Age: 64
End: 2019-10-24
Payer: COMMERCIAL

## 2019-10-24 VITALS
HEART RATE: 87 BPM | BODY MASS INDEX: 27.63 KG/M2 | OXYGEN SATURATION: 97 % | HEIGHT: 70 IN | WEIGHT: 193 LBS | SYSTOLIC BLOOD PRESSURE: 134 MMHG | DIASTOLIC BLOOD PRESSURE: 80 MMHG

## 2019-10-24 DIAGNOSIS — R06.02 SHORTNESS OF BREATH: ICD-10-CM

## 2019-10-24 DIAGNOSIS — I73.9 CLAUDICATION (HCC): ICD-10-CM

## 2019-10-24 DIAGNOSIS — I21.11 ST ELEVATION MYOCARDIAL INFARCTION INVOLVING RIGHT CORONARY ARTERY (HCC): ICD-10-CM

## 2019-10-24 DIAGNOSIS — Z72.0 TOBACCO USE: Primary | ICD-10-CM

## 2019-10-24 PROCEDURE — 99213 OFFICE O/P EST LOW 20 MIN: CPT | Performed by: INTERNAL MEDICINE

## 2019-10-24 RX ORDER — FUROSEMIDE 20 MG/1
20 TABLET ORAL DAILY
Qty: 90 TABLET | Refills: 3 | Status: SHIPPED | OUTPATIENT
Start: 2019-10-24 | End: 2020-11-04 | Stop reason: SDUPTHER

## 2019-10-24 RX ORDER — POTASSIUM CHLORIDE 20 MEQ/1
20 TABLET, EXTENDED RELEASE ORAL DAILY
Qty: 90 TABLET | Refills: 3 | Status: SHIPPED | OUTPATIENT
Start: 2019-10-24 | End: 2020-11-04 | Stop reason: SDUPTHER

## 2019-10-24 NOTE — PATIENT INSTRUCTIONS
Please start taking furosemide (Lasix) 20 mg daily  Please start taking potassium chloride 20 mEq daily  These prescriptions have been sent to pharmacy  Please have your blood drawn in 1 week to monitor your kidney function and electrolytes as well as to check her cholesterol  An echocardiogram, pulmonary function testing and an exercise evaluation of the circulation of year legs has been ordered  You will be called to schedule these appointments  Please follow up in our office in 3 months, sooner if any acute issues arise

## 2019-10-24 NOTE — LETTER
October 24, 2019     Kiana Benjamin MD  [address] [facility] [address]    Patient: Yoshi Green   YOB: 1955   Date of Visit: 10/24/2019       Dear Dr Osmin Gerber:    Thank you for referring Yoshi Green to me for evaluation  Below are my notes for this consultation  If you have questions, please do not hesitate to call me  I look forward to following your patient along with you  Sincerely,        Kat Villa DO        CC: No Recipients  Kat Villa DO  10/24/2019 10:09 AM  Sign at close encounter                                             Cardiology Follow Up    Yoshi Green  1955  [id number]  [facility] Yoshi Remy    Dear Dr Rivka Coronado is a 58-year-old gentleman who has been referred to the [address] [place] [address] Place of Cardiology in [facility] with the following issues:     1  Inferior ST-elevation myocardial infarction, status post PCI to the proximal RCA (3 5 x 24 [employer] synergy drug-eluting stent) on 05/16/2018  2  Mild ischemic cardiomyopathy, preserved ejection fraction  3  History of tobacco abuse, quit    Interval History:  Since our last visit, Mr Brian Frances has developed some shortness of breath  He reports having to stop mowing his lawn so after several passes with his self-propelled   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where [employer] leaf blower, but as soon as he used the  to mow sleeves, he again felt shortness of breath  There appears to be no relationship to seasonal change  He owns an adjustable bed and does sleep significantly propped up  He denies any lower extremity edema  He does admit to some lower extremity heaviness and fatigue with walking  He does admit to some muscle cramps with walking  No chest pain, palpitations or angina similar to his ST-elevation presentation      Patient Active Problem List Diagnosis    ST elevation myocardial infarction involving right coronary artery (HCC)    Mixed hyperlipidemia    Tobacco use    Transaminitis     History reviewed  No pertinent past medical history  Social History     Socioeconomic History    Marital status: /Civil Union     Spouse name: Not on file    Number of children: Not on file    Years of education: Not on file    Highest education level: Not on file   Occupational History    Not on file   Social Needs    Financial resource strain: Not on file    Food insecurity:     Worry: Not on file     Inability: Not on file    Transportation needs:     Medical: Not on file     Non-medical: Not on file   Tobacco Use    Smoking status: Former Smoker     Last attempt to quit: 2019     Years since quittin 4    Smokeless tobacco: Never Used   Substance and Sexual Activity    Alcohol use: Never     Frequency: Never    Drug use: Never    Sexual activity: Never   Lifestyle    Physical activity:     Days per week: Not on file     Minutes per session: Not on file    Stress: Not on file   Relationships    Social connections:     Talks on phone: Not on file     Gets together: Not on file     Attends Ep[employer] service: Not on file     Active member of club or organization: Not on file     Attends meetings of clubs or organizations: Not on file     Relationship status: Not on file    Intimate partner violence:     Fear of current or ex partner: Not on file     Emotionally abused: Not on file     Physically abused: Not on file     Forced sexual activity: Not on file   Other Topics Concern    Not on file   Social History Narrative    Not on file      History reviewed  No pertinent family history  History reviewed  No pertinent surgical history      Current Outpatient Medications:     aspirin (ECOTRIN LOW STRENGTH) 81 mg EC tablet, Take 1 tablet (81 mg total) by mouth daily, Disp: 30 tablet, Rfl: 0    atorvastatin (LIPITOR) 40 mg tablet, Take 1 tablet (40 mg total) by mouth every evening, Disp: 90 tablet, Rfl: 3    metoprolol tartrate (LOPRESSOR) 25 mg tablet, Take 1 tablet (25 mg total) by mouth every 12 (twelve) hours, Disp: 180 tablet, Rfl: 3    Misc Natural Products (OSTEO BI-FLEX/5-LOXIN ADVANCED PO), Take by mouth, Disp: , Rfl:     ticagrelor (BRILINTA) 90 MG, Take 1 tablet (90 mg total) by mouth 2 (two) times a day, Disp: 180 tablet, Rfl: 3       No Known Allergies    Labs:  Results for Maycol Franz (MRN [id number]) as of 10/24/2019 10:04   10/4/2019 08:10   Sodium 140   Potassium 4 6   Chloride 110 (H)   CO2 25   Anion Gap 5   BUN 21   Creatinine 1 27   GLUCOSE FASTING 107 (H)   Calcium 9 3   AST 13   ALT 27   Alkaline Phosphatase 102   Total Protein 7 8   Albumin 4 3   TOTAL BILIRUBIN 0 38   eGFR 58         Imaging:   No recent relevant imaging  Review of Systems:  Review of Systems   Constitutional: Negative  Respiratory: Positive for shortness of breath (With exertion, recumbent position)  Cardiovascular: Negative  Gastrointestinal: Negative  Endocrine: Negative  Musculoskeletal:        Lower extremity cramping   Skin: Negative  Neurological: Negative  Hematological: Negative  Physical Exam:  Physical Exam   Constitutional: He is oriented to person, place, and time  He appears well-developed and well-nourished  HENT:   Head: Normocephalic and atraumatic  Eyes: Conjunctivae and EOM are normal    Neck: No hepatojugular reflux and no JVD present  Carotid bruit is not present  No tracheal deviation present  No thyromegaly present  Cardiovascular: Normal rate, regular rhythm, S1 normal and S2 normal  PMI is not displaced  Exam reveals no gallop  No murmur heard  Pulses:       Carotid pulses are 2+ on the right side, and 2+ on the left side  Radial pulses are 2+ on the right side, and 2+ on the left side  Femoral pulses are 0 on the right side, and 0 on the left side         Dorsalis pedis pulses are 0 on the right side, and 0 on the left side  Posterior tibial pulses are 0 on the right side, and 0 on the left side  Pulmonary/Chest: No accessory muscle usage  No tachypnea  No respiratory distress  He has decreased breath sounds  Abdominal: Soft  Bowel sounds are normal  He exhibits no distension  There is no tenderness  Musculoskeletal: He exhibits no edema  Lymphadenopathy:        Head (right side): No submental, no submandibular, no tonsillar, no preauricular, no posterior auricular and no occipital adenopathy present  Head (left side): No submental, no submandibular, no tonsillar, no preauricular, no posterior auricular and no occipital adenopathy present  He has no cervical adenopathy  Neurological: He is alert and oriented to person, place, and time  Skin: Skin is warm and dry  Psychiatric: He has a normal mood and affect  His behavior is normal  Judgment and thought content normal        Discussion/Summary:  Shortness of breath/dyspnea on exertion  Lower extremity cramping/claudication  Inferior ST-elevation myocardial infarction, status post PCI to the proximal RCA (3 5 x 24 [employer] synergy drug-eluting stent) on 05/16/2018  Mild ischemic cardiomyopathy, preserved ejection fraction  History of tobacco abuse, quit    Differential diagnosis on shortness of breath and dyspnea on exertion is broad  Possibilities include residual ischemic cardiomyopathy or progression of his coronary disease, though this is unlikely given his lack of chest pain  Other possibilities include undiagnosed COPD versus medication effect from the tachycardia lower  Echocardiogram and PFTs were ordered  Given the presence of JVD with orthopnea, furosemide 20 mg and potassium chloride 20 mEq daily was ordered  BMP, magnesium and phosphorus in 1 week to monitor kidney function and electrolytes      In regards to his lower extremity cramping and claudication, exercise ABIs were ordered  Depending on this test, we may order angiography versus CTA  Atorvastatin may also be causing lower extremity myopathy  Coronary artery disease is likely stable  He is on appropriate dual antiplatelet therapy, metoprolol and atorvastatin  Repeat lipid panel in 1 week  He will follow up with us in 3 months, sooner if any acute issues arise  Thank you for the opportunity to consult on this patient  If you have any questions, please feel free to call my office

## 2019-10-24 NOTE — PROGRESS NOTES
Cardiology Follow Up    Elizabeth Henderson  1955  [id number]  5 [facility]    Dear Dr Dinh Cancino is a 61-year-old gentleman who has been referred to the [address] [place] [address] of Cardiology in [place] with the following issues:     1  Inferior ST-elevation myocardial infarction, status post PCI to the proximal RCA (3 5 x 24 [employer] synergy drug-eluting stent) on 05/16/2018  2  Mild ischemic cardiomyopathy, preserved ejection fraction  3  History of tobacco abuse, quit    Interval History:  Since our last visit, Mr Bailee Burgos has developed some shortness of breath  He reports having to stop mowing his lawn so after several passes with his self-propelled   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[employer] leaf blower, but as soon as he used the  to mow sleeves, he again felt shortness of breath  There appears to be no relationship to seasonal change  He owns an adjustable bed and does sleep significantly propped up  He denies any lower extremity edema  He does admit to some lower extremity heaviness and fatigue with walking  He does admit to some muscle cramps with walking  No chest pain, palpitations or angina similar to his ST-elevation presentation  Patient Active Problem List   Diagnosis    ST elevation myocardial infarction involving right coronary artery (HCC)    Mixed hyperlipidemia    Tobacco use    Transaminitis     History reviewed  No pertinent past medical history    Social History     Socioeconomic History    Marital status: /Civil Union     Spouse name: Not on file    Number of children: Not on file    Years of education: Not on file    Highest education level: Not on file   Occupational History    Not on file   Social Needs    Financial resource strain: Not on file    Food insecurity:     Worry: Not on file     Inability: Not on file    Transportation needs:     Medical: Not on file     Non-medical: Not on file   Tobacco Use    Smoking status: Former Smoker     Last attempt to quit: 2019     Years since quittin 4    Smokeless tobacco: Never Used   Substance and Sexual Activity    Alcohol use: Never     Frequency: Never    Drug use: Never    Sexual activity: Never   Lifestyle    Physical activity:     Days per week: Not on file     Minutes per session: Not on file    Stress: Not on file   Relationships    Social connections:     Talks on phone: Not on file     Gets together: Not on file     Attends Yazidism service: Not on file     Active member of club or organization: Not on file     Attends meetings of clubs or organizations: Not on file     Relationship status: Not on file    Intimate partner violence:     Fear of current or ex partner: Not on file     Emotionally abused: Not on file     Physically abused: Not on file     Forced sexual activity: Not on file   Other Topics Concern    Not on file   Social History Narrative    Not on file      History reviewed  No pertinent family history  History reviewed  No pertinent surgical history      Current Outpatient Medications:     aspirin (ECOTRIN LOW STRENGTH) 81 mg EC tablet, Take 1 tablet (81 mg total) by mouth daily, Disp: 30 tablet, Rfl: 0    atorvastatin (LIPITOR) 40 mg tablet, Take 1 tablet (40 mg total) by mouth every evening, Disp: 90 tablet, Rfl: 3    metoprolol tartrate (LOPRESSOR) 25 mg tablet, Take 1 tablet (25 mg total) by mouth every 12 (twelve) hours, Disp: 180 tablet, Rfl: 3    Misc Natural Products (OSTEO BI-FLEX/5-LOXIN ADVANCED PO), Take by mouth, Disp: , Rfl:     ticagrelor (BRILINTA) 90 MG, Take 1 tablet (90 mg total) by mouth 2 (two) times a day, Disp: 180 tablet, Rfl: 3       No Known Allergies    Labs:  Results for Tian Rodríguez (MRN [id number]) as of 10/24/2019 10:04   10/4/2019 08:10   Sodium 140   Potassium 4 6   Chloride 110 (H)   CO2 25   Anion Gap 5   BUN 21   Creatinine 1 27   GLUCOSE FASTING 107 (H)   Calcium 9 3   AST 13   ALT 27   Alkaline Phosphatase 102   Total Protein 7 8   Albumin 4 3   TOTAL BILIRUBIN 0 38   eGFR 58         Imaging:   No recent relevant imaging  Review of Systems:  Review of Systems   Constitutional: Negative  Respiratory: Positive for shortness of breath (With exertion, recumbent position)  Cardiovascular: Negative  Gastrointestinal: Negative  Endocrine: Negative  Musculoskeletal:        Lower extremity cramping   Skin: Negative  Neurological: Negative  Hematological: Negative  Physical Exam:  Physical Exam   Constitutional: He is oriented to person, place, and time  He appears well-developed and well-nourished  HENT:   Head: Normocephalic and atraumatic  Eyes: Conjunctivae and EOM are normal    Neck: No hepatojugular reflux and no JVD present  Carotid bruit is not present  No tracheal deviation present  No thyromegaly present  Cardiovascular: Normal rate, regular rhythm, S1 normal and S2 normal  PMI is not displaced  Exam reveals no gallop  No murmur heard  Pulses:       Carotid pulses are 2+ on the right side, and 2+ on the left side  Radial pulses are 2+ on the right side, and 2+ on the left side  Femoral pulses are 0 on the right side, and 0 on the left side  Dorsalis pedis pulses are 0 on the right side, and 0 on the left side  Posterior tibial pulses are 0 on the right side, and 0 on the left side  Pulmonary/Chest: No accessory muscle usage  No tachypnea  No respiratory distress  He has decreased breath sounds  Abdominal: Soft  Bowel sounds are normal  He exhibits no distension  There is no tenderness  Musculoskeletal: He exhibits no edema  Lymphadenopathy:        Head (right side): No submental, no submandibular, no tonsillar, no preauricular, no posterior auricular and no occipital adenopathy present  Head (left side): No submental, no submandibular, no tonsillar, no preauricular, no posterior auricular and no occipital adenopathy present  He has no cervical adenopathy  Neurological: He is alert and oriented to person, place, and time  Skin: Skin is warm and dry  Psychiatric: He has a normal mood and affect  His behavior is normal  Judgment and thought content normal        Discussion/Summary:  Shortness of breath/dyspnea on exertion  Lower extremity cramping/claudication  Inferior ST-elevation myocardial infarction, status post PCI to the proximal RCA (3 5 x 24 [employer] synergy drug-eluting stent) on 05/16/2018  Mild ischemic cardiomyopathy, preserved ejection fraction  History of tobacco abuse, quit    Differential diagnosis on shortness of breath and dyspnea on exertion is broad  Possibilities include residual ischemic cardiomyopathy or progression of his coronary disease, though this is unlikely given his lack of chest pain  Other possibilities include undiagnosed COPD versus medication effect from the tachycardia lower  Echocardiogram and PFTs were ordered  Given the presence of JVD with orthopnea, furosemide 20 mg and potassium chloride 20 mEq daily was ordered  BMP, magnesium and phosphorus in 1 week to monitor kidney function and electrolytes  In regards to his lower extremity cramping and claudication, exercise ABIs were ordered  Depending on this test, we may order angiography versus CTA  Atorvastatin may also be causing lower extremity myopathy  Coronary artery disease is likely stable  He is on appropriate dual antiplatelet therapy, metoprolol and atorvastatin  Repeat lipid panel in 1 week  He will follow up with us in 3 months, sooner if any acute issues arise  Thank you for the opportunity to consult on this patient  If you have any questions, please feel free to call my office

## 2019-10-30 ENCOUNTER — APPOINTMENT (OUTPATIENT)
Dept: LAB | Facility: CLINIC | Age: 64
End: 2019-10-30
Payer: COMMERCIAL

## 2019-10-30 DIAGNOSIS — R06.02 SHORTNESS OF BREATH: ICD-10-CM

## 2019-10-30 DIAGNOSIS — I21.11 ST ELEVATION MYOCARDIAL INFARCTION INVOLVING RIGHT CORONARY ARTERY (HCC): ICD-10-CM

## 2019-10-30 LAB
ANION GAP SERPL CALCULATED.3IONS-SCNC: 6 MMOL/L (ref 4–13)
BUN SERPL-MCNC: 24 MG/DL (ref 5–25)
CALCIUM SERPL-MCNC: 9.7 MG/DL (ref 8.3–10.1)
CHLORIDE SERPL-SCNC: 110 MMOL/L (ref 100–108)
CHOLEST SERPL-MCNC: 110 MG/DL (ref 50–200)
CO2 SERPL-SCNC: 24 MMOL/L (ref 21–32)
CREAT SERPL-MCNC: 1.45 MG/DL (ref 0.6–1.3)
GFR SERPL CREATININE-BSD FRML MDRD: 50 ML/MIN/1.73SQ M
GLUCOSE P FAST SERPL-MCNC: 98 MG/DL (ref 65–99)
HDLC SERPL-MCNC: 35 MG/DL
LDLC SERPL CALC-MCNC: 55 MG/DL (ref 0–100)
MAGNESIUM SERPL-MCNC: 2.5 MG/DL (ref 1.6–2.6)
NONHDLC SERPL-MCNC: 75 MG/DL
PHOSPHATE SERPL-MCNC: 3 MG/DL (ref 2.3–4.1)
POTASSIUM SERPL-SCNC: 4.4 MMOL/L (ref 3.5–5.3)
SODIUM SERPL-SCNC: 140 MMOL/L (ref 136–145)
TRIGL SERPL-MCNC: 98 MG/DL

## 2019-10-30 PROCEDURE — 80048 BASIC METABOLIC PNL TOTAL CA: CPT

## 2019-10-30 PROCEDURE — 80061 LIPID PANEL: CPT

## 2019-10-30 PROCEDURE — 36415 COLL VENOUS BLD VENIPUNCTURE: CPT

## 2019-10-30 PROCEDURE — 84100 ASSAY OF PHOSPHORUS: CPT

## 2019-10-30 PROCEDURE — 83735 ASSAY OF MAGNESIUM: CPT

## 2019-11-19 ENCOUNTER — HOSPITAL ENCOUNTER (OUTPATIENT)
Dept: NON INVASIVE DIAGNOSTICS | Facility: CLINIC | Age: 64
Discharge: HOME | End: 2019-11-19
Payer: COMMERCIAL

## 2019-11-19 DIAGNOSIS — R06.02 SHORTNESS OF BREATH: ICD-10-CM

## 2019-11-19 DIAGNOSIS — Z72.0 TOBACCO USE: ICD-10-CM

## 2019-11-19 DIAGNOSIS — I73.9 CLAUDICATION (HCC): ICD-10-CM

## 2019-11-19 PROCEDURE — 93306 TTE W/DOPPLER COMPLETE: CPT

## 2019-11-19 PROCEDURE — 93306 TTE W/DOPPLER COMPLETE: CPT | Performed by: INTERNAL MEDICINE

## 2019-11-19 PROCEDURE — 93924 LWR XTR VASC STDY BILAT: CPT

## 2019-11-19 PROCEDURE — 93924 LWR XTR VASC STDY BILAT: CPT | Performed by: SURGERY

## 2019-11-20 ENCOUNTER — TELEPHONE (OUTPATIENT)
Dept: CARDIOLOGY CLINIC | Facility: CLINIC | Age: 64
End: 2019-11-20

## 2019-11-20 NOTE — TELEPHONE ENCOUNTER
Isabella Ochoa, DO  P Cardiology [facility] Clinical             Please call the patient and inform him that his lower extremity arteries are normal and his echocardiogram is unchanged   We can still see the effect of his heart attack, but his global function is preserved       [address]    Previous Messages            Result Notes for Echo complete with contrast if indicated     Notes recorded by Lou Richardson MA on 11/20/2019 at 9:45 AM EST  S/w pt and he verbally understood per Dr Oxana Lynne echo Is unchanged and lower extremity arteries are normal  I informed him you can still see the effect of his previous heart attack, but his global function is preserved

## 2019-11-25 ENCOUNTER — HOSPITAL ENCOUNTER (OUTPATIENT)
Dept: PULMONOLOGY | Facility: HOSPITAL | Age: 64
Discharge: HOME | End: 2019-11-25
Attending: INTERNAL MEDICINE
Payer: COMMERCIAL

## 2019-11-25 DIAGNOSIS — R06.02 SHORTNESS OF BREATH: ICD-10-CM

## 2019-11-25 DIAGNOSIS — Z72.0 TOBACCO USE: ICD-10-CM

## 2019-11-25 PROCEDURE — 94060 EVALUATION OF WHEEZING: CPT | Performed by: INTERNAL MEDICINE

## 2019-11-25 PROCEDURE — 94060 EVALUATION OF WHEEZING: CPT

## 2019-11-25 PROCEDURE — 94727 GAS DIL/WSHOT DETER LNG VOL: CPT | Performed by: INTERNAL MEDICINE

## 2019-11-25 PROCEDURE — 94760 N-INVAS EAR/PLS OXIMETRY 1: CPT

## 2019-11-25 PROCEDURE — 94727 GAS DIL/WSHOT DETER LNG VOL: CPT

## 2019-11-25 PROCEDURE — 94729 DIFFUSING CAPACITY: CPT | Performed by: INTERNAL MEDICINE

## 2019-11-25 PROCEDURE — 94729 DIFFUSING CAPACITY: CPT

## 2019-11-25 RX ORDER — ALBUTEROL SULFATE 2.5 MG/3ML
2.5 SOLUTION RESPIRATORY (INHALATION) ONCE
Status: COMPLETED | OUTPATIENT
Start: 2019-11-25 | End: 2019-11-25

## 2019-11-25 RX ADMIN — ALBUTEROL SULFATE 2.5 MG: 2.5 SOLUTION RESPIRATORY (INHALATION) at 12:50

## 2019-12-02 ENCOUNTER — TELEPHONE (OUTPATIENT)
Dept: CARDIOLOGY CLINIC | Facility: CLINIC | Age: 64
End: 2019-12-02

## 2019-12-02 NOTE — TELEPHONE ENCOUNTER
Koki Zamora DO  P Cardiology [facility] Clinical             Please call the patient and inform him that his pulmonary function testing does not show any COPD or restrictive lung disease   We will continue the current medications  [address]    Previous Messages            Result Notes for Complete pulmonary function test     Notes recorded by Junito Cheney MA on 12/2/2019 at 3:04 PM EST  S/w pt and he verbally understood per Dr Rolly Gowers PFT did not show COPD or restrictive lung disease  Pt is to continue present medications

## 2020-05-27 DIAGNOSIS — I21.11 ST ELEVATION MYOCARDIAL INFARCTION INVOLVING RIGHT CORONARY ARTERY (HCC): ICD-10-CM

## 2020-05-27 RX ORDER — ATORVASTATIN CALCIUM 40 MG/1
40 TABLET, FILM COATED ORAL EVERY EVENING
Qty: 90 TABLET | Refills: 3 | Status: SHIPPED | OUTPATIENT
Start: 2020-05-27 | End: 2020-08-28

## 2020-06-11 DIAGNOSIS — I21.11 ST ELEVATION MYOCARDIAL INFARCTION INVOLVING RIGHT CORONARY ARTERY (HCC): ICD-10-CM

## 2020-07-08 ENCOUNTER — TELEPHONE (OUTPATIENT)
Dept: CARDIOLOGY CLINIC | Facility: CLINIC | Age: 65
End: 2020-07-08

## 2020-07-08 DIAGNOSIS — I21.11 ST ELEVATION MYOCARDIAL INFARCTION INVOLVING RIGHT CORONARY ARTERY (HCC): ICD-10-CM

## 2020-07-08 NOTE — TELEPHONE ENCOUNTER
Pt called requesting refills  Pt was aware a follow up is needed, pt will call back after reviewing the doctors

## 2020-08-24 ENCOUNTER — OFFICE VISIT (OUTPATIENT)
Dept: CARDIOLOGY CLINIC | Facility: CLINIC | Age: 65
End: 2020-08-24
Payer: COMMERCIAL

## 2020-08-24 VITALS
OXYGEN SATURATION: 95 % | HEIGHT: 70 IN | DIASTOLIC BLOOD PRESSURE: 82 MMHG | SYSTOLIC BLOOD PRESSURE: 148 MMHG | HEART RATE: 99 BPM | BODY MASS INDEX: 28.02 KG/M2 | WEIGHT: 195.7 LBS

## 2020-08-24 DIAGNOSIS — I25.10 ATHEROSCLEROSIS OF NATIVE CORONARY ARTERY OF NATIVE HEART WITHOUT ANGINA PECTORIS: Primary | ICD-10-CM

## 2020-08-24 DIAGNOSIS — R06.00 DYSPNEA ON EXERTION: ICD-10-CM

## 2020-08-24 DIAGNOSIS — E78.2 MIXED HYPERLIPIDEMIA: ICD-10-CM

## 2020-08-24 DIAGNOSIS — I21.11 ST ELEVATION MYOCARDIAL INFARCTION INVOLVING RIGHT CORONARY ARTERY (HCC): ICD-10-CM

## 2020-08-24 PROCEDURE — 93000 ELECTROCARDIOGRAM COMPLETE: CPT | Performed by: INTERNAL MEDICINE

## 2020-08-24 PROCEDURE — 99214 OFFICE O/P EST MOD 30 MIN: CPT | Performed by: INTERNAL MEDICINE

## 2020-08-24 RX ORDER — CLOPIDOGREL BISULFATE 75 MG/1
75 TABLET ORAL DAILY
Qty: 90 TABLET | Refills: 0 | Status: SHIPPED | OUTPATIENT
Start: 2020-08-24 | End: 2020-11-18 | Stop reason: SDUPTHER

## 2020-08-24 RX ORDER — ISOSORBIDE MONONITRATE 30 MG/1
30 TABLET, EXTENDED RELEASE ORAL DAILY
Qty: 90 TABLET | Refills: 0 | Status: SHIPPED | OUTPATIENT
Start: 2020-08-24 | End: 2020-11-18 | Stop reason: SDUPTHER

## 2020-08-24 NOTE — PROGRESS NOTES
[facility] [address] [address]  Cardiology Follow Up    Ten Brar  1955  [id number]      1  Atherosclerosis of native coronary artery of native heart without angina pectoris  POCT ECG    NM myocardial perfusion spect (rx stress and/or rest)    Comprehensive metabolic panel    Lipid Panel with Direct LDL reflex    isosorbide mononitrate (IMDUR) 30 mg 24 hr tablet    clopidogrel (PLAVIX) 75 mg tablet   2  Dyspnea on exertion  POCT ECG    NM myocardial perfusion spect (rx stress and/or rest)    Comprehensive metabolic panel    Lipid Panel with Direct LDL reflex    isosorbide mononitrate (IMDUR) 30 mg 24 hr tablet    clopidogrel (PLAVIX) 75 mg tablet   3  Mixed hyperlipidemia  POCT ECG    NM myocardial perfusion spect (rx stress and/or rest)    Comprehensive metabolic panel    Lipid Panel with Direct LDL reflex    isosorbide mononitrate (IMDUR) 30 mg 24 hr tablet    clopidogrel (PLAVIX) 75 mg tablet       Chief Complaint   Patient presents with   Jesica Hammond     Former Dr Florencia Nicole patient       Interval History:   69-year-old male with coronary artery disease status post inferior STEMI in May of 2018 status post drug-eluting stent x1 to proximal RCA(3 5 x 24 [employer] synergy drug-eluting stent), hyperlipidemia, mild ischemic cardiomyopathy with preserved ejection fraction,  Ex-smoker presented for follow-up  patient was last seen in Cardiology Clinic in October of 2019  Patient continues to have dyspnea on exertion  He was started on Lasix but continues to have dyspnea on exertion  He also underwent pulmonary function test and as per patient was okay  Repeat echocardiogram in November of 2019 showed preserved LVEF with  Mild inferior hypokinesis  Patient denies chest pain, shortness of breath at rest, diaphoresis, dizziness, nausea, vomiting orthopnea or loss of consciousness    Denies leg edema     denies bleeding issues   labs from 2019 reviewed  Mildly worsened creatinine and LDL was at goal 55     EKG today sinus rhythm, normal axis, inferior infarct age undetermined   cardiac catheterization in 2018 showed ostial LAD 30% disease proximal % occlusion status post successful PCI with drug-eluting stent x1    Review of Systems:   review of system negative except as mentioned above    Patient Active Problem List   Diagnosis    ST elevation myocardial infarction involving right coronary artery ([address] )    Mixed hyperlipidemia    Tobacco use    Transaminitis     History reviewed  No pertinent past medical history    Social History     Socioeconomic History    Marital status: /Civil Union     Spouse name: Not on file    Number of children: Not on file    Years of education: Not on file    Highest education level: Not on file   Occupational History    Not on file   Social Needs    Financial resource strain: Not on file    Food insecurity     Worry: Not on file     Inability: Not on file    Transportation needs     Medical: Not on file     Non-medical: Not on file   Tobacco Use    Smoking status: Former Smoker     Last attempt to quit: 2019     Years since quittin 2    Smokeless tobacco: Never Used   Substance and Sexual Activity    Alcohol use: Never     Frequency: Never    Drug use: Never    Sexual activity: Never   Lifestyle    Physical activity     Days per week: Not on file     Minutes per session: Not on file    Stress: Not on file   Relationships    Social connections     Talks on phone: Not on file     Gets together: Not on file     Attends Mosque service: Not on file     Active member of club or organization: Not on file     Attends meetings of clubs or organizations: Not on file     Relationship status: Not on file    Intimate partner violence     Fear of current or ex partner: Not on file     Emotionally abused: Not on file     Physically abused: Not on file     Forced sexual activity: Not on file   Other Topics Concern    Not on file   Social History Narrative    Not on file      History reviewed  No pertinent family history  History reviewed  No pertinent surgical history  Current Outpatient Medications:     aspirin (ECOTRIN LOW STRENGTH) 81 mg EC tablet, Take 1 tablet (81 mg total) by mouth daily, Disp: 30 tablet, Rfl: 0    atorvastatin (LIPITOR) 40 mg tablet, Take 1 tablet (40 mg total) by mouth every evening, Disp: 90 tablet, Rfl: 3    furosemide (LASIX) 20 mg tablet, Take 1 tablet (20 mg total) by mouth daily, Disp: 90 tablet, Rfl: 3    metoprolol tartrate (LOPRESSOR) 25 mg tablet, Take 1 tablet (25 mg total) by mouth every 12 (twelve) hours, Disp: 180 tablet, Rfl: 3    Misc Natural Products (OSTEO BI-FLEX/5-LOXIN ADVANCED PO), Take by mouth, Disp: , Rfl:     potassium chloride (K-DUR,KLOR-CON) 20 mEq tablet, Take 1 tablet (20 mEq total) by mouth daily, Disp: 90 tablet, Rfl: 3    clopidogrel (PLAVIX) 75 mg tablet, Take 1 tablet (75 mg total) by mouth daily, Disp: 90 tablet, Rfl: 0    isosorbide mononitrate (IMDUR) 30 mg 24 hr tablet, Take 1 tablet (30 mg total) by mouth daily, Disp: 90 tablet, Rfl: 0  No Known Allergies    Labs:  No visits with results within 2 Month(s) from this visit  Latest known visit with results is:   Appointment on 10/30/2019   Component Date Value    Sodium 10/30/2019 140     Potassium 10/30/2019 4 4     Chloride 10/30/2019 110*    CO2 10/30/2019 24     ANION GAP 10/30/2019 6     BUN 10/30/2019 24     Creatinine 10/30/2019 1 45*    Glucose, Fasting 10/30/2019 98     Calcium 10/30/2019 9 7     eGFR 10/30/2019 50     Magnesium 10/30/2019 2 5     Phosphorus 10/30/2019 3 0     Cholesterol 10/30/2019 110     Triglycerides 10/30/2019 98     HDL, Direct 10/30/2019 35*    LDL Calculated 10/30/2019 55     Non-HDL-Chol (CHOL-HDL) 10/30/2019 75      Imaging: No results found      Physical Exam:  General:  moderate built, awake, alert and oriented x3, not in distress  Neck: supple, no JVD  Eyes: PERRL, conjunctiva normal  Lungs:  Bilateral air entry positive, no wheeze/rhonchi or crackle  Heart:  S1-S2 normal, no murmur  Abdomen:  Soft ,nondistended ,nontender, bowel sounds positive  Extremities:  No leg edema, no deformity, ROM normal  Neuro:  Moving all extremities, speech clear  Skin: warm, no rash    /82   Pulse 99   Ht 5' 10" (1 778 m)   Wt 88 8 kg (195 lb 11 2 oz)   SpO2 95%   BMI 28 08 kg/m²         Assessment:    1  Dyspnea on exertion   could be anginal equivalent  Echocardiogram showed preserved LVEF with mild inferior hypokinesis    2  Coronary artery disease status post inferior STEMI in May of 2018 status post PCI of proximal RCA  Ostial LAD has 30% stenosis    3  Hyperlipidemia  LDL at goal  4  Ex-smoker quit more than 2 years back    Recommendations:   I will add Imdur 30 mg  Side effects discussed with patient  Patient understands and willing to take   patient is more than 1 year from last PCI  In view of current symptoms and patient able to tolerate antiplatelet to well,  His willing to continue dual antiplatelet  Jeancarlos Anchors will be changed to Plavix   continue aspirin, Lipitor, Lasix, potassium and metoprolol   I would get repeat lipid panel and renal function   patient with above-mentioned symptoms and abnormal echo  Would benefit from exercise MPI stress test to evaluate for progression of coronary artery disease   above all discussed with patient    Patient understands and agrees   return to clinic in 3 months or earlier as need

## 2020-08-28 ENCOUNTER — APPOINTMENT (OUTPATIENT)
Dept: LAB | Facility: CLINIC | Age: 65
End: 2020-08-28
Payer: COMMERCIAL

## 2020-08-28 DIAGNOSIS — E78.2 MIXED HYPERLIPIDEMIA: ICD-10-CM

## 2020-08-28 DIAGNOSIS — R06.00 DYSPNEA ON EXERTION: ICD-10-CM

## 2020-08-28 DIAGNOSIS — I25.10 ATHEROSCLEROSIS OF NATIVE CORONARY ARTERY OF NATIVE HEART WITHOUT ANGINA PECTORIS: ICD-10-CM

## 2020-08-28 DIAGNOSIS — I21.11 ST ELEVATION MYOCARDIAL INFARCTION INVOLVING RIGHT CORONARY ARTERY (HCC): ICD-10-CM

## 2020-08-28 DIAGNOSIS — E78.2 MIXED HYPERLIPIDEMIA: Primary | ICD-10-CM

## 2020-08-28 LAB
ALBUMIN SERPL BCP-MCNC: 4 G/DL (ref 3.5–5)
ALP SERPL-CCNC: 113 U/L (ref 46–116)
ALT SERPL W P-5'-P-CCNC: 22 U/L (ref 12–78)
ANION GAP SERPL CALCULATED.3IONS-SCNC: 4 MMOL/L (ref 4–13)
AST SERPL W P-5'-P-CCNC: 8 U/L (ref 5–45)
BILIRUB SERPL-MCNC: 0.52 MG/DL (ref 0.2–1)
BUN SERPL-MCNC: 19 MG/DL (ref 5–25)
CALCIUM SERPL-MCNC: 8.9 MG/DL (ref 8.3–10.1)
CHLORIDE SERPL-SCNC: 114 MMOL/L (ref 100–108)
CHOLEST SERPL-MCNC: 89 MG/DL (ref 50–200)
CO2 SERPL-SCNC: 24 MMOL/L (ref 21–32)
CREAT SERPL-MCNC: 1.31 MG/DL (ref 0.6–1.3)
GFR SERPL CREATININE-BSD FRML MDRD: 56 ML/MIN/1.73SQ M
GLUCOSE P FAST SERPL-MCNC: 109 MG/DL (ref 65–99)
HDLC SERPL-MCNC: 29 MG/DL
LDLC SERPL CALC-MCNC: 38 MG/DL (ref 0–100)
POTASSIUM SERPL-SCNC: 4.1 MMOL/L (ref 3.5–5.3)
PROT SERPL-MCNC: 7.6 G/DL (ref 6.4–8.2)
SODIUM SERPL-SCNC: 142 MMOL/L (ref 136–145)
TRIGL SERPL-MCNC: 109 MG/DL

## 2020-08-28 PROCEDURE — 80061 LIPID PANEL: CPT

## 2020-08-28 PROCEDURE — 36415 COLL VENOUS BLD VENIPUNCTURE: CPT

## 2020-08-28 PROCEDURE — 80053 COMPREHEN METABOLIC PANEL: CPT

## 2020-08-28 RX ORDER — ATORVASTATIN CALCIUM 40 MG/1
20 TABLET, FILM COATED ORAL EVERY EVENING
Qty: 90 TABLET | Refills: 3 | Status: SHIPPED | OUTPATIENT
Start: 2020-08-28 | End: 2021-11-10

## 2020-09-21 ENCOUNTER — HOSPITAL ENCOUNTER (OUTPATIENT)
Dept: NON INVASIVE DIAGNOSTICS | Facility: CLINIC | Age: 65
Discharge: HOME | End: 2020-09-21
Payer: COMMERCIAL

## 2020-09-21 DIAGNOSIS — I25.10 ATHEROSCLEROSIS OF NATIVE CORONARY ARTERY OF NATIVE HEART WITHOUT ANGINA PECTORIS: ICD-10-CM

## 2020-09-21 DIAGNOSIS — R06.00 DYSPNEA ON EXERTION: ICD-10-CM

## 2020-09-21 DIAGNOSIS — I25.10 CORONARY ARTERY DISEASE INVOLVING NATIVE CORONARY ARTERY OF NATIVE HEART WITHOUT ANGINA PECTORIS: Primary | ICD-10-CM

## 2020-09-21 DIAGNOSIS — E78.2 MIXED HYPERLIPIDEMIA: ICD-10-CM

## 2020-09-21 LAB
MAX DIASTOLIC BP: 74 MMHG
MAX HEART RATE: 95 BPM
MAX PREDICTED HEART RATE: 153 BPM
MAX. SYSTOLIC BP: 116 MMHG
PROTOCOL NAME: NORMAL
REASON FOR TERMINATION: NORMAL
TARGET HR FORMULA: NORMAL
TIME IN EXERCISE PHASE: NORMAL

## 2020-09-21 PROCEDURE — 93017 CV STRESS TEST TRACING ONLY: CPT

## 2020-09-21 PROCEDURE — A9502 TC99M TETROFOSMIN: HCPCS

## 2020-09-21 PROCEDURE — 78452 HT MUSCLE IMAGE SPECT MULT: CPT

## 2020-09-21 PROCEDURE — G1004 CDSM NDSC: HCPCS

## 2020-09-21 PROCEDURE — 93018 CV STRESS TEST I&R ONLY: CPT | Performed by: INTERNAL MEDICINE

## 2020-09-21 PROCEDURE — 93016 CV STRESS TEST SUPVJ ONLY: CPT | Performed by: INTERNAL MEDICINE

## 2020-09-21 PROCEDURE — 78452 HT MUSCLE IMAGE SPECT MULT: CPT | Performed by: INTERNAL MEDICINE

## 2020-09-21 RX ADMIN — REGADENOSON 0.4 MG: 0.08 INJECTION, SOLUTION INTRAVENOUS at 09:10

## 2020-10-13 ENCOUNTER — HOSPITAL ENCOUNTER (OUTPATIENT)
Dept: NON INVASIVE DIAGNOSTICS | Facility: CLINIC | Age: 65
Discharge: HOME | End: 2020-10-13
Payer: COMMERCIAL

## 2020-10-13 DIAGNOSIS — I25.10 CORONARY ARTERY DISEASE INVOLVING NATIVE CORONARY ARTERY OF NATIVE HEART WITHOUT ANGINA PECTORIS: ICD-10-CM

## 2020-10-13 PROCEDURE — 93306 TTE W/DOPPLER COMPLETE: CPT | Performed by: INTERNAL MEDICINE

## 2020-10-13 PROCEDURE — 93306 TTE W/DOPPLER COMPLETE: CPT

## 2020-11-04 ENCOUNTER — TELEPHONE (OUTPATIENT)
Dept: CARDIOLOGY CLINIC | Facility: CLINIC | Age: 65
End: 2020-11-04

## 2020-11-04 DIAGNOSIS — R06.02 SHORTNESS OF BREATH: ICD-10-CM

## 2020-11-04 RX ORDER — POTASSIUM CHLORIDE 20 MEQ/1
20 TABLET, EXTENDED RELEASE ORAL DAILY
Qty: 90 TABLET | Refills: 3 | Status: SHIPPED | OUTPATIENT
Start: 2020-11-04 | End: 2021-11-09 | Stop reason: SDUPTHER

## 2020-11-04 RX ORDER — FUROSEMIDE 20 MG/1
20 TABLET ORAL DAILY
Qty: 90 TABLET | Refills: 3 | Status: SHIPPED | OUTPATIENT
Start: 2020-11-04 | End: 2020-11-05

## 2020-11-05 DIAGNOSIS — R06.02 SHORTNESS OF BREATH: ICD-10-CM

## 2020-11-05 RX ORDER — FUROSEMIDE 20 MG/1
TABLET ORAL
Qty: 90 TABLET | Refills: 3 | Status: SHIPPED | OUTPATIENT
Start: 2020-11-05 | End: 2021-10-18

## 2020-11-18 DIAGNOSIS — R06.00 DYSPNEA ON EXERTION: ICD-10-CM

## 2020-11-18 DIAGNOSIS — I25.10 ATHEROSCLEROSIS OF NATIVE CORONARY ARTERY OF NATIVE HEART WITHOUT ANGINA PECTORIS: ICD-10-CM

## 2020-11-18 DIAGNOSIS — E78.2 MIXED HYPERLIPIDEMIA: ICD-10-CM

## 2020-11-18 RX ORDER — ISOSORBIDE MONONITRATE 30 MG/1
30 TABLET, EXTENDED RELEASE ORAL DAILY
Qty: 90 TABLET | Refills: 3 | Status: SHIPPED | OUTPATIENT
Start: 2020-11-18 | End: 2021-11-09 | Stop reason: SDUPTHER

## 2020-11-18 RX ORDER — CLOPIDOGREL BISULFATE 75 MG/1
75 TABLET ORAL DAILY
Qty: 90 TABLET | Refills: 3 | Status: SHIPPED | OUTPATIENT
Start: 2020-11-18 | End: 2021-10-18

## 2021-03-22 ENCOUNTER — TELEPHONE (OUTPATIENT)
Dept: CARDIOLOGY CLINIC | Facility: CLINIC | Age: 66
End: 2021-03-22

## 2021-03-22 NOTE — TELEPHONE ENCOUNTER
I can see the patient in the clinic next week for preoperative cardiac risk assessment   patient can be added to my schedule

## 2021-03-22 NOTE — TELEPHONE ENCOUNTER
Dixon Mendez from [facility] called stating that patient was recently in the hospital for severe anemia and pancreatic mass  Dixon Mendez stated that patient will need to be cleared for Bowel Resection and Robotic distal pandc/spleen  Hospital notes and office notes sent to central to be scanned into chart

## 2021-03-23 ENCOUNTER — TELEPHONE (OUTPATIENT)
Dept: CARDIOLOGY CLINIC | Facility: CLINIC | Age: 66
End: 2021-03-23

## 2021-03-23 NOTE — TELEPHONE ENCOUNTER
Pt called the office after speaking to his surgeon  Pt states he is seeing DR Stephanie Cook this Thursday for his cardiac clearance

## 2021-03-23 NOTE — TELEPHONE ENCOUNTER
Called pt to make appt with Dr Joanna Aviles for next week & he stated that they have his surgery scheduled for Monday  I advised pt that Dr Joanna Aviles will not be in the office until next week & we do not have any other available providers this week  He was going to call the surgeons office & see if surgery is able to be pushed out at all

## 2021-05-28 ENCOUNTER — TELEPHONE (OUTPATIENT)
Dept: CARDIOLOGY CLINIC | Facility: CLINIC | Age: 66
End: 2021-05-28

## 2021-05-28 NOTE — TELEPHONE ENCOUNTER
Should patient to continue Lasix? LM asking for call back to ask about most recent labs to have sent to office

## 2021-05-28 NOTE — TELEPHONE ENCOUNTER
Pt called & said that he was at the [address] & they want to know if pt has to continue to take the Lasix due to his kidney function    The [facility] had also put pt on Gleevec 400mg 1x daily & Ferosul 325mg 1x daily   Pt asked if that can be added to his current med list

## 2021-06-14 ENCOUNTER — TELEPHONE (OUTPATIENT)
Dept: CARDIOLOGY CLINIC | Facility: CLINIC | Age: 66
End: 2021-06-14

## 2021-06-14 DIAGNOSIS — I21.11 ST ELEVATION MYOCARDIAL INFARCTION INVOLVING RIGHT CORONARY ARTERY (HCC): ICD-10-CM

## 2021-08-09 ENCOUNTER — TELEPHONE (OUTPATIENT)
Dept: CARDIOLOGY CLINIC | Facility: CLINIC | Age: 66
End: 2021-08-09

## 2021-10-18 ENCOUNTER — OFFICE VISIT (OUTPATIENT)
Dept: CARDIOLOGY CLINIC | Facility: CLINIC | Age: 66
End: 2021-10-18
Payer: COMMERCIAL

## 2021-10-18 VITALS
DIASTOLIC BLOOD PRESSURE: 80 MMHG | SYSTOLIC BLOOD PRESSURE: 130 MMHG | WEIGHT: 193.2 LBS | HEART RATE: 86 BPM | BODY MASS INDEX: 27.72 KG/M2 | OXYGEN SATURATION: 97 %

## 2021-10-18 DIAGNOSIS — I25.10 ATHEROSCLEROSIS OF NATIVE CORONARY ARTERY OF NATIVE HEART WITHOUT ANGINA PECTORIS: Primary | ICD-10-CM

## 2021-10-18 DIAGNOSIS — E78.2 MIXED HYPERLIPIDEMIA: ICD-10-CM

## 2021-10-18 DIAGNOSIS — I73.9 CLAUDICATION (HCC): ICD-10-CM

## 2021-10-18 PROCEDURE — 99214 OFFICE O/P EST MOD 30 MIN: CPT | Performed by: INTERNAL MEDICINE

## 2021-10-18 RX ORDER — IMATINIB MESYLATE 400 MG/1
400 TABLET, FILM COATED ORAL DAILY
COMMUNITY

## 2021-10-18 RX ORDER — FLUTICASONE PROPIONATE 50 MCG
1 SPRAY, SUSPENSION (ML) NASAL DAILY
COMMUNITY

## 2021-10-18 RX ORDER — CETIRIZINE HYDROCHLORIDE 10 MG/1
10 TABLET ORAL DAILY
COMMUNITY

## 2021-10-18 RX ORDER — ALUMINUM ZIRCONIUM OCTACHLOROHYDREX GLY 16 G/100G
2 GEL TOPICAL
COMMUNITY

## 2021-10-18 RX ORDER — PREDNISONE 20 MG/1
20 TABLET ORAL DAILY
COMMUNITY

## 2021-11-09 DIAGNOSIS — E78.2 MIXED HYPERLIPIDEMIA: ICD-10-CM

## 2021-11-09 DIAGNOSIS — I25.10 ATHEROSCLEROSIS OF NATIVE CORONARY ARTERY OF NATIVE HEART WITHOUT ANGINA PECTORIS: ICD-10-CM

## 2021-11-09 DIAGNOSIS — R06.00 DYSPNEA ON EXERTION: ICD-10-CM

## 2021-11-09 DIAGNOSIS — R06.02 SHORTNESS OF BREATH: ICD-10-CM

## 2021-11-09 RX ORDER — POTASSIUM CHLORIDE 20 MEQ/1
20 TABLET, EXTENDED RELEASE ORAL DAILY
Qty: 90 TABLET | Refills: 3 | Status: SHIPPED | OUTPATIENT
Start: 2021-11-09

## 2021-11-09 RX ORDER — ISOSORBIDE MONONITRATE 30 MG/1
30 TABLET, EXTENDED RELEASE ORAL DAILY
Qty: 90 TABLET | Refills: 3 | Status: SHIPPED | OUTPATIENT
Start: 2021-11-09

## 2021-11-10 DIAGNOSIS — I21.11 ST ELEVATION MYOCARDIAL INFARCTION INVOLVING RIGHT CORONARY ARTERY (HCC): ICD-10-CM

## 2021-11-10 RX ORDER — ATORVASTATIN CALCIUM 40 MG/1
TABLET, FILM COATED ORAL
Qty: 90 TABLET | Refills: 3 | Status: SHIPPED | OUTPATIENT
Start: 2021-11-10 | End: 2021-11-11 | Stop reason: SDUPTHER

## 2021-11-11 DIAGNOSIS — I21.11 ST ELEVATION MYOCARDIAL INFARCTION INVOLVING RIGHT CORONARY ARTERY (HCC): ICD-10-CM

## 2021-11-11 RX ORDER — ATORVASTATIN CALCIUM 40 MG/1
40 TABLET, FILM COATED ORAL DAILY
Qty: 90 TABLET | Refills: 3 | Status: SHIPPED | OUTPATIENT
Start: 2021-11-11

## 2022-06-02 DIAGNOSIS — I21.11 ST ELEVATION MYOCARDIAL INFARCTION INVOLVING RIGHT CORONARY ARTERY (HCC): ICD-10-CM

## 2022-06-02 NOTE — TELEPHONE ENCOUNTER
Name of Caller: Patient walked in     Call back Number: [phone]    Medication(s): metoprolol tartrate (LOPRESSOR) 25 mg tablet   Are we prescribing provider?:    30 or 90 day supply: 90 day supply     Pharmacy name/number: Shyla Acuña on file     Last or Next appt?:

## 2022-06-30 ENCOUNTER — APPOINTMENT (OUTPATIENT)
Dept: LAB | Facility: CLINIC | Age: 67
End: 2022-06-30
Payer: COMMERCIAL

## 2022-06-30 DIAGNOSIS — E78.2 MIXED HYPERLIPIDEMIA: ICD-10-CM

## 2022-06-30 LAB
CHOLEST SERPL-MCNC: 112 MG/DL
HDLC SERPL-MCNC: 34 MG/DL
LDLC SERPL CALC-MCNC: 54 MG/DL (ref 0–100)
TRIGL SERPL-MCNC: 122 MG/DL

## 2022-06-30 PROCEDURE — 80061 LIPID PANEL: CPT

## 2022-06-30 PROCEDURE — 36415 COLL VENOUS BLD VENIPUNCTURE: CPT

## 2022-07-07 ENCOUNTER — OFFICE VISIT (OUTPATIENT)
Dept: CARDIOLOGY CLINIC | Facility: CLINIC | Age: 67
End: 2022-07-07
Payer: COMMERCIAL

## 2022-07-07 VITALS
DIASTOLIC BLOOD PRESSURE: 90 MMHG | HEIGHT: 70 IN | OXYGEN SATURATION: 98 % | RESPIRATION RATE: 18 BRPM | WEIGHT: 194 LBS | BODY MASS INDEX: 27.77 KG/M2 | SYSTOLIC BLOOD PRESSURE: 148 MMHG | HEART RATE: 74 BPM

## 2022-07-07 DIAGNOSIS — I25.10 ATHEROSCLEROSIS OF NATIVE CORONARY ARTERY OF NATIVE HEART WITHOUT ANGINA PECTORIS: ICD-10-CM

## 2022-07-07 DIAGNOSIS — E78.2 MIXED HYPERLIPIDEMIA: ICD-10-CM

## 2022-07-07 DIAGNOSIS — I50.33 ACUTE ON CHRONIC DIASTOLIC CONGESTIVE HEART FAILURE (HCC): Primary | ICD-10-CM

## 2022-07-07 PROCEDURE — 99214 OFFICE O/P EST MOD 30 MIN: CPT | Performed by: INTERNAL MEDICINE

## 2022-07-07 RX ORDER — FUROSEMIDE 20 MG/1
20 TABLET ORAL DAILY
Qty: 90 TABLET | Refills: 3 | Status: SHIPPED | OUTPATIENT
Start: 2022-07-07

## 2022-07-07 RX ORDER — CLOPIDOGREL BISULFATE 75 MG/1
75 TABLET ORAL DAILY
COMMUNITY
End: 2022-09-09

## 2022-07-07 NOTE — PROGRESS NOTES
[facility]  [address] [address]  Cardiology Follow Up    Raina Salazar  1955  [id number]      1  Acute on chronic diastolic congestive heart failure (HCC)  furosemide (LASIX) 20 mg tablet    Basic metabolic panel    Echo complete w/ contrast if indicated   2  Atherosclerosis of native coronary artery of native heart without angina pectoris  Echo complete w/ contrast if indicated   3  Mixed hyperlipidemia         Chief Complaint   Patient presents with    Follow-up     C/o SOB on exertion       Interval History:   24-year-old male with coronary artery status post PCI of proximal RCA with drug-eluting stent 3 5 x 24 mm for inferior STEMI in 2018, hyperlipidemia, mild ischemic cardiomyopathy with preserved ejection fraction, ex-smoker presented for follow-up    Since last clinic visit patient is doing okay  For last few weeks he is feeling congestion and fluid in right year  He has followed up with primary care any anti but still having congestion  He also is feeling shortness of breath which he thinks secondary to his stuffy nose but does get shortness of breath on exertion  He occasionally gets orthopnea and has noted intermittent leg swelling which is not persistent  He denies chest pain at rest or on exertion, no shortness of breath at rest, denies palpitation, paroxysmal nocturnal dyspnea or loss of consciousness    He went to emergency department [facility] in May 2022 for shortness of breath and no significant finding  His serial troponins were negative    His V/Q scan did not show any evidence of pulmonary embolism and he was discharged from emergency department  Patient denies black stool or blood in stool  He did not have any similar symptoms when he had his MI in 2018  Patient has also gained weight since last clinic visit    Recent labs reviewed in Care everywhere done in May 2022  Serial troponin negative  Creatinine AST ALT within normal limits  EKG during hospital course did not show any signs of dynamic ST T-wave changes  Current medications reviewed    Review of Systems: All review of system negative except as mentioned above    Patient Active Problem List   Diagnosis    ST elevation myocardial infarction involving right coronary artery ([facility] )    Mixed hyperlipidemia    Tobacco use    Transaminitis    Claudication ([facility] )     No past medical history on file  Social History     Socioeconomic History    Marital status: /Civil Union     Spouse name: Not on file    Number of children: Not on file    Years of education: Not on file    Highest education level: Not on file   Occupational History    Not on file   Tobacco Use    Smoking status: Former Smoker     Quit date: 5/12/2019     Years since quitting: 3 1    Smokeless tobacco: Never Used   Substance and Sexual Activity    Alcohol use: Never    Drug use: Never    Sexual activity: Never   Other Topics Concern    Not on file   Social History Narrative    Not on file     Social Determinants of Health     Financial Resource Strain: Not on file   Food Insecurity: Not on file   Transportation Needs: Not on file   Physical Activity: Not on file   Stress: Not on file   Social Connections: Not on file   Intimate Partner Violence: Not on file   Housing Stability: Not on file      No family history on file  No past surgical history on file      Current Outpatient Medications:     aspirin (ECOTRIN LOW STRENGTH) 81 mg EC tablet, Take 1 tablet (81 mg total) by mouth daily, Disp: 30 tablet, Rfl: 0    cetirizine (ZyrTEC) 10 mg tablet, Take 10 mg by mouth daily, Disp: , Rfl:     clopidogrel (PLAVIX) 75 mg tablet, Take 75 mg by mouth daily, Disp: , Rfl:     fluticasone (FLONASE) 50 mcg/act nasal spray, 1 spray into each nostril daily, Disp: , Rfl:     furosemide (LASIX) 20 mg tablet, Take 1 tablet (20 mg total) by mouth daily, Disp: 90 tablet, Rfl: 3    imatinib (GLEEVEC) 400 mg tablet, Take 400 mg by mouth daily, Disp: , Rfl:     isosorbide mononitrate (IMDUR) 30 mg 24 hr tablet, Take 1 tablet (30 mg total) by mouth daily, Disp: 90 tablet, Rfl: 3    metoprolol tartrate (LOPRESSOR) 25 mg tablet, Take 1 tablet (25 mg total) by mouth every 12 (twelve) hours, Disp: 180 tablet, Rfl: 3    potassium chloride (K-DUR,KLOR-CON) 20 mEq tablet, Take 1 tablet (20 mEq total) by mouth daily, Disp: 90 tablet, Rfl: 3    atorvastatin (LIPITOR) 40 mg tablet, Take 1 tablet (40 mg total) by mouth daily (Patient not taking: Reported on 7/7/2022), Disp: 90 tablet, Rfl: 3    bifidobacterium infantis (ALIGN) capsule, Take 2 tablets by mouth (Patient not taking: Reported on 7/7/2022), Disp: , Rfl:     Misc Natural Products (OSTEO BI-FLEX/5-LOXIN ADVANCED PO), Take by mouth (Patient not taking: No sig reported), Disp: , Rfl:     predniSONE 20 mg tablet, Take 20 mg by mouth daily (Patient not taking: Reported on 7/7/2022), Disp: , Rfl:   No Known Allergies    Labs:  Appointment on 06/30/2022   Component Date Value    Cholesterol 06/30/2022 112     Triglycerides 06/30/2022 122     HDL, Direct 06/30/2022 34 (A)    LDL Calculated 06/30/2022 54      Imaging: No results found  Physical Exam:  General:  Obese, awake, alert and oriented x3, not in distress  Neck: supple, no JVD  Eyes: PERRL, conjunctiva normal  Lungs:  Bilateral air entry positive, no wheeze/rhonchi or crackle  Heart:  S1-S2 normal, no murmur  Abdomen:  Soft ,nondistended ,nontender, bowel sounds positive  Extremities:  No leg edema, no deformity, ROM normal  Neuro:  Moving all extremities, speech clear  Skin: warm, no rash     /90 (BP Location: Left arm, Patient Position: Sitting, Cuff Size: Standard)   Pulse 74   Resp 18   Ht 5' 10" (1 778 m)   Wt 88 kg (194 lb)   SpO2 98%   BMI 27 84 kg/m²     Cardiographics :  As mentioned above      Assessment:    1  Acute on chronic diastolic heart failure  2   Coronary disease status post PCI of RCA in May 2018 for STEMI  Currently denies chest pain  3  Hyperlipidemia  4  Ex-smoker  5  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 currently on Gleevec and being followed by Oncology    Recommendations:    Start Lasix 20 mg daily  Patient to repeat BMP after 10 days of starting Lasix to evaluate for renal function  Continue aspirin, Lipitor, isosorbide, metoprolol tartrate  Patient will confirm that he is taking Lipitor at home he is not sure at present time  Continue oral potassium    I will get 2D echocardiogram to evaluate for structural heart disease    Advised to take low-salt, low-fat/low-cholesterol diet and try to lose weight  Patient was educated about cardiac symptoms to watch out for and if he experiences those or any progression of current symptoms then he review showed report to emergency department    Return to clinic in 3 months or early as needed  Above all discussed with patient    Patient understands and agrees

## 2022-07-15 ENCOUNTER — APPOINTMENT (OUTPATIENT)
Dept: LAB | Facility: CLINIC | Age: 67
End: 2022-07-15
Payer: COMMERCIAL

## 2022-07-15 DIAGNOSIS — I50.33 ACUTE ON CHRONIC DIASTOLIC CONGESTIVE HEART FAILURE (HCC): ICD-10-CM

## 2022-07-15 LAB
ANION GAP SERPL CALCULATED.3IONS-SCNC: 5 MMOL/L (ref 4–13)
BUN SERPL-MCNC: 20 MG/DL (ref 5–25)
CALCIUM SERPL-MCNC: 9.1 MG/DL (ref 8.3–10.1)
CHLORIDE SERPL-SCNC: 108 MMOL/L (ref 100–108)
CO2 SERPL-SCNC: 28 MMOL/L (ref 21–32)
CREAT SERPL-MCNC: 1.46 MG/DL (ref 0.6–1.3)
GFR SERPL CREATININE-BSD FRML MDRD: 48 ML/MIN/1.73SQ M
GLUCOSE SERPL-MCNC: 122 MG/DL (ref 65–140)
POTASSIUM SERPL-SCNC: 4 MMOL/L (ref 3.5–5.3)
SODIUM SERPL-SCNC: 141 MMOL/L (ref 136–145)

## 2022-07-15 PROCEDURE — 36415 COLL VENOUS BLD VENIPUNCTURE: CPT

## 2022-07-15 PROCEDURE — 80048 BASIC METABOLIC PNL TOTAL CA: CPT

## 2022-07-22 ENCOUNTER — HOSPITAL ENCOUNTER (OUTPATIENT)
Dept: NON INVASIVE DIAGNOSTICS | Facility: CLINIC | Age: 67
Discharge: HOME | End: 2022-07-22
Payer: COMMERCIAL

## 2022-07-22 VITALS
SYSTOLIC BLOOD PRESSURE: 148 MMHG | HEART RATE: 69 BPM | DIASTOLIC BLOOD PRESSURE: 90 MMHG | BODY MASS INDEX: 27.77 KG/M2 | HEIGHT: 70 IN | WEIGHT: 194 LBS

## 2022-07-22 DIAGNOSIS — I50.33 ACUTE ON CHRONIC DIASTOLIC CONGESTIVE HEART FAILURE (HCC): ICD-10-CM

## 2022-07-22 DIAGNOSIS — I25.10 ATHEROSCLEROSIS OF NATIVE CORONARY ARTERY OF NATIVE HEART WITHOUT ANGINA PECTORIS: ICD-10-CM

## 2022-07-22 PROCEDURE — 93306 TTE W/DOPPLER COMPLETE: CPT

## 2022-07-25 LAB
AORTIC ROOT: 3.7 CM
APICAL FOUR CHAMBER EJECTION FRACTION: 49 %
AV LVOT PEAK GRADIENT: 2 MMHG
AV PEAK GRADIENT: 5 MMHG
E WAVE DECELERATION TIME: 247 MS
FRACTIONAL SHORTENING: 28 % (ref 28–44)
INTERVENTRICULAR SEPTUM IN DIASTOLE (PARASTERNAL SHORT AXIS VIEW): 1.1 CM
INTERVENTRICULAR SEPTUM: 1.1 CM (ref 0.6–1.1)
LAAS-AP2: 13.5 CM2
LAAS-AP4: 11.3 CM2
LEFT ATRIUM AREA SYSTOLE SINGLE PLANE A4C: 12.2 CM2
LEFT ATRIUM SIZE: 3.6 CM
LEFT INTERNAL DIMENSION IN SYSTOLE: 3.3 CM (ref 2.1–4)
LEFT VENTRICLE DIASTOLIC VOLUME (MOD BIPLANE): 122 ML
LEFT VENTRICLE SYSTOLIC VOLUME (MOD BIPLANE): 55 ML
LEFT VENTRICULAR INTERNAL DIMENSION IN DIASTOLE: 4.6 CM (ref 3.5–6)
LEFT VENTRICULAR POSTERIOR WALL IN END DIASTOLE: 1 CM
LEFT VENTRICULAR STROKE VOLUME: 54 ML
LV EF: 55 %
LVSV (TEICH): 54 ML
MV E'TISSUE VEL-LAT: 6 CM/S
MV PEAK A VEL: 0.53 M/S
MV PEAK E VEL: 68 CM/S
MV STENOSIS PRESSURE HALF TIME: 72 MS
MV VALVE AREA P 1/2 METHOD: 3.06 CM2
RIGHT ATRIUM AREA SYSTOLE A4C: 10.7 CM2
RIGHT VENTRICLE ID DIMENSION: 2.7 CM
SL CV LEFT ATRIUM LENGTH A2C: 4.2 CM
SL CV LV EF: 50
SL CV PED ECHO LEFT VENTRICLE DIASTOLIC VOLUME (MOD BIPLANE) 2D: 97 ML
SL CV PED ECHO LEFT VENTRICLE SYSTOLIC VOLUME (MOD BIPLANE) 2D: 43 ML

## 2022-07-25 PROCEDURE — 93306 TTE W/DOPPLER COMPLETE: CPT | Performed by: INTERNAL MEDICINE

## 2022-09-09 ENCOUNTER — OFFICE VISIT (OUTPATIENT)
Dept: CARDIOLOGY CLINIC | Facility: CLINIC | Age: 67
End: 2022-09-09
Payer: COMMERCIAL

## 2022-09-09 VITALS
HEIGHT: 70 IN | WEIGHT: 208 LBS | DIASTOLIC BLOOD PRESSURE: 70 MMHG | OXYGEN SATURATION: 96 % | RESPIRATION RATE: 16 BRPM | SYSTOLIC BLOOD PRESSURE: 118 MMHG | BODY MASS INDEX: 29.78 KG/M2 | HEART RATE: 75 BPM

## 2022-09-09 DIAGNOSIS — I21.11 ST ELEVATION MYOCARDIAL INFARCTION INVOLVING RIGHT CORONARY ARTERY (HCC): Primary | ICD-10-CM

## 2022-09-09 PROCEDURE — 99214 OFFICE O/P EST MOD 30 MIN: CPT | Performed by: INTERNAL MEDICINE

## 2022-09-09 NOTE — PROGRESS NOTES
Cardiology Follow Up    Rachel Hicks  1955  [id number]  [facility] [address] [place] [address] [phone]    There are no diagnoses linked to this encounter  Interval History:  49-year-old man who has a history of a previous STEMI at which time he felt like there was an elephant on his chest who was treated with a drug-eluting stent in the right coronary artery for at an inferior infarction  Subsequent stress test was negative  He had 1 echo which reportedly an ejection fraction of 60% and a more recent 1 was estimated to be 50%  Because he is 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 he was referred back  The patient denies any new symptoms of shortness of breath or limitation of activity  He wishes to be off some of his medications  Patient Active Problem List   Diagnosis    ST elevation myocardial infarction involving right coronary artery (HCC)    Mixed hyperlipidemia    Tobacco use    Transaminitis    Claudication [facility])     History reviewed  No pertinent past medical history    Social History     Socioeconomic History    Marital status: /Civil Union     Spouse name: Not on file    Number of children: Not on file    Years of education: Not on file    Highest education level: Not on file   Occupational History    Not on file   Tobacco Use    Smoking status: Former Smoker     Quit date: 5/12/2019     Years since quitting: 3 3    Smokeless tobacco: Never Used   Substance and Sexual Activity    Alcohol use: Never    Drug use: Never    Sexual activity: Never   Other Topics Concern    Not on file   Social History Narrative    Not on file     Social Determinants of Health     Financial Resource Strain: Not on file   Food Insecurity: Not on file   Transportation Needs: Not on file   Physical Activity: Not on file   Stress: Not on file   Social Connections: Not on file Intimate Partner Violence: Not on file   Housing Stability: Not on file      History reviewed  No pertinent family history  History reviewed  No pertinent surgical history      Current Outpatient Medications:     aspirin (ECOTRIN LOW STRENGTH) 81 mg EC tablet, Take 1 tablet (81 mg total) by mouth daily, Disp: 30 tablet, Rfl: 0    atorvastatin (LIPITOR) 40 mg tablet, Take 1 tablet (40 mg total) by mouth daily, Disp: 90 tablet, Rfl: 3    furosemide (LASIX) 20 mg tablet, Take 1 tablet (20 mg total) by mouth daily, Disp: 90 tablet, Rfl: 3    imatinib (GLEEVEC) 400 mg tablet, Take 400 mg by mouth daily, Disp: , Rfl:     metoprolol tartrate (LOPRESSOR) 25 mg tablet, Take 1 tablet (25 mg total) by mouth every 12 (twelve) hours, Disp: 180 tablet, Rfl: 3    potassium chloride (K-DUR,KLOR-CON) 20 mEq tablet, Take 1 tablet (20 mEq total) by mouth daily, Disp: 90 tablet, Rfl: 3    bifidobacterium infantis (ALIGN) capsule, Take 2 tablets by mouth (Patient not taking: No sig reported), Disp: , Rfl:     cetirizine (ZyrTEC) 10 mg tablet, Take 10 mg by mouth daily (Patient not taking: Reported on 9/9/2022), Disp: , Rfl:     fluticasone (FLONASE) 50 mcg/act nasal spray, 1 spray into each nostril daily (Patient not taking: Reported on 9/9/2022), Disp: , Rfl:     Misc Natural Products (OSTEO BI-FLEX/5-LOXIN ADVANCED PO), Take by mouth (Patient not taking: No sig reported), Disp: , Rfl:     predniSONE 20 mg tablet, Take 20 mg by mouth daily (Patient not taking: No sig reported), Disp: , Rfl:   No Known Allergies    Labs:  Hospital Outpatient Visit on 07/22/2022   Component Date Value    LA size 07/22/2022 3 6     LVPWd 07/22/2022 1 00     Left Atrium Area-systoli* 07/22/2022 13 5     Left Atrium Area-systoli* 07/22/2022 11 3     MV E' Tissue Velocity La* 07/22/2022 6     IVSd 07/22/2022 3 51     LV DIASTOLIC VOLUME (MOD* 82/06/2322 97     LEFT VENTRICLE SYSTOLIC * 36/61/8728 43     Left ventricular stroke * 07/22/2022 54 00     EF 07/22/2022 55     A4C EF 07/22/2022 49     LA length (A2C) 07/22/2022 4 20     LVIDd 07/22/2022 4 60     IVS 07/22/2022 1 1     LVIDS 07/22/2022 3 30     FS 07/22/2022 28     Ao root 07/22/2022 3 70     RVID d 07/22/2022 2 7     AV LVOT peak gradient 07/22/2022 2     MV valve area p 1/2 meth* 07/22/2022 3 06     E wave deceleration time 07/22/2022 247     AV peak gradient 07/22/2022 5     MV Peak E Bradley 07/22/2022 68     MV Peak A Bradley 07/22/2022 6 46     LV Systolic Volume (BP) 98/71/2816 55     LV Diastolic Volume (BP) 59/41/1774 122     SRINIVAS A4C 07/22/2022 12 2     RAA A4C 07/22/2022 10 7     MV stenosis pressure 1/2* 07/22/2022 72     LVSV, 2D 07/22/2022 54     LV EF 07/22/2022 50    Appointment on 07/15/2022   Component Date Value    Sodium 07/15/2022 141     Potassium 07/15/2022 4 0     Chloride 07/15/2022 108     CO2 07/15/2022 28     ANION GAP 07/15/2022 5     BUN 07/15/2022 20     Creatinine 07/15/2022 1 46 (A)    Glucose 07/15/2022 122     Calcium 07/15/2022 9 1     eGFR 07/15/2022 48      Imaging: No results found  Review of Systems:  Review of Systems    Physical Exam:  118/70  Seventy-five and regular  Lungs clear  No carotid bruits  Neck veins not distended  Regular rhythm  No murmurs  No edema  Discussion/Summary:    1  Drop in ejection fraction by echo of 10%-I reviewed the echoes and there does appear to be a slightly lower ejection fraction on the 2nd one versus the 1st but it is difficult to be sure  2  [address] therapy for tumor as mentioned above-Gleevec has been known to affect the heart although apparently it is in a minority of patients  3  Stable coronary artery disease    Recommendations:    1  Repeat echo as well as G LS in 6 months for further evaluation  2  Send note to primary oncologist who is taking care of the patient  3  Discontinue Plavix and Imdur  4   Return after repeat echo      Taj Irene MD

## 2022-09-29 ENCOUNTER — HOSPITAL ENCOUNTER (OUTPATIENT)
Dept: NON INVASIVE DIAGNOSTICS | Facility: CLINIC | Age: 67
Discharge: HOME | End: 2022-09-29
Payer: COMMERCIAL

## 2022-09-29 DIAGNOSIS — I21.11 ST ELEVATION MYOCARDIAL INFARCTION INVOLVING RIGHT CORONARY ARTERY (HCC): ICD-10-CM

## 2022-09-29 LAB
AORTIC ROOT: 3.8 CM
APICAL FOUR CHAMBER EJECTION FRACTION: 54 %
E WAVE DECELERATION TIME: 149 MS
FRACTIONAL SHORTENING: 26 % (ref 28–44)
INTERVENTRICULAR SEPTUM IN DIASTOLE (PARASTERNAL SHORT AXIS VIEW): 1.2 CM
INTERVENTRICULAR SEPTUM: 1.2 CM (ref 0.6–1.1)
LEFT ATRIUM SIZE: 3.9 CM
LEFT INTERNAL DIMENSION IN SYSTOLE: 3.9 CM (ref 2.1–4)
LEFT VENTRICULAR INTERNAL DIMENSION IN DIASTOLE: 5.3 CM (ref 3.5–6)
LEFT VENTRICULAR POSTERIOR WALL IN END DIASTOLE: 0.9 CM
LEFT VENTRICULAR STROKE VOLUME: 67 ML
LVSV (TEICH): 67 ML
MV PEAK A VEL: 0.81 M/S
MV PEAK E VEL: 90 CM/S
MV STENOSIS PRESSURE HALF TIME: 43 MS
MV VALVE AREA P 1/2 METHOD: 5.12 CM2
SL CV LV EF: 50
SL CV PED ECHO LEFT VENTRICLE DIASTOLIC VOLUME (MOD BIPLANE) 2D: 134 ML
SL CV PED ECHO LEFT VENTRICLE SYSTOLIC VOLUME (MOD BIPLANE) 2D: 67 ML

## 2022-09-29 PROCEDURE — 93325 DOPPLER ECHO COLOR FLOW MAPG: CPT

## 2022-09-29 PROCEDURE — 93321 DOPPLER ECHO F-UP/LMTD STD: CPT | Performed by: INTERNAL MEDICINE

## 2022-09-29 PROCEDURE — 93321 DOPPLER ECHO F-UP/LMTD STD: CPT

## 2022-09-29 PROCEDURE — 93356 MYOCRD STRAIN IMG SPCKL TRCK: CPT | Performed by: INTERNAL MEDICINE

## 2022-09-29 PROCEDURE — 93308 TTE F-UP OR LMTD: CPT

## 2022-09-29 PROCEDURE — 93308 TTE F-UP OR LMTD: CPT | Performed by: INTERNAL MEDICINE

## 2022-09-29 PROCEDURE — 93325 DOPPLER ECHO COLOR FLOW MAPG: CPT | Performed by: INTERNAL MEDICINE

## 2022-10-27 ENCOUNTER — OFFICE VISIT (OUTPATIENT)
Dept: FAMILY MEDICINE CLINIC | Facility: CLINIC | Age: 67
End: 2022-10-27

## 2022-10-27 VITALS
SYSTOLIC BLOOD PRESSURE: 126 MMHG | HEIGHT: 70 IN | HEART RATE: 78 BPM | BODY MASS INDEX: 30.15 KG/M2 | WEIGHT: 210.6 LBS | DIASTOLIC BLOOD PRESSURE: 84 MMHG | TEMPERATURE: 98.8 F | OXYGEN SATURATION: 98 %

## 2022-10-27 DIAGNOSIS — Z76.89 ESTABLISHING CARE WITH NEW DOCTOR, ENCOUNTER FOR: ICD-10-CM

## 2022-10-27 DIAGNOSIS — Z12.11 SCREENING FOR COLORECTAL CANCER: ICD-10-CM

## 2022-10-27 DIAGNOSIS — R20.2 PARESTHESIA OF BOTH HANDS: Primary | ICD-10-CM

## 2022-10-27 DIAGNOSIS — R09.81 CHRONIC NASAL CONGESTION: ICD-10-CM

## 2022-10-27 DIAGNOSIS — Z23 NEED FOR INFLUENZA VACCINATION: ICD-10-CM

## 2022-10-27 DIAGNOSIS — I25.10 CORONARY ARTERY DISEASE INVOLVING NATIVE HEART, UNSPECIFIED VESSEL OR LESION TYPE, UNSPECIFIED WHETHER ANGINA PRESENT: ICD-10-CM

## 2022-10-27 DIAGNOSIS — E78.2 MIXED HYPERLIPIDEMIA: ICD-10-CM

## 2022-10-27 DIAGNOSIS — Z90.81 POST-SPLENECTOMY: ICD-10-CM

## 2022-10-27 DIAGNOSIS — C49.A3 GIST (GASTROINTESTINAL STROMAL TUMOR) OF SMALL BOWEL, MALIGNANT (HCC): ICD-10-CM

## 2022-10-27 DIAGNOSIS — Z12.12 SCREENING FOR COLORECTAL CANCER: ICD-10-CM

## 2022-10-27 RX ORDER — FLUTICASONE PROPIONATE 50 MCG
1 SPRAY, SUSPENSION (ML) NASAL DAILY
Qty: 18.2 ML | Refills: 1 | Status: SHIPPED | OUTPATIENT
Start: 2022-10-27

## 2022-10-27 NOTE — ASSESSMENT & PLAN NOTE
S/p MI on 5/16/2019  Had occlusion 30% of LAD and 100% RAD  Follows with [facility]'s cardiology  Quit tobacco use, encouraged heart-healthy diet, BP managed  Denies any symptoms at this time  To continue with cardiology follow-up as scheduled

## 2022-10-27 NOTE — ASSESSMENT & PLAN NOTE
Will need to get records from Infectious Disease to see which vaccinations he is due for    Get influenza vaccine today

## 2022-10-27 NOTE — ASSESSMENT & PLAN NOTE
Follows with [facility] surgical oncology and hematology/oncology  Has upcoming appointment and is due for blood work and CT scan  On Gleevec  To continue with regular followups with specialist as scheduled

## 2022-10-27 NOTE — PROGRESS NOTES
Name: Kim Martínez      : 1955      MRN: [id number]  Encounter Provider: FRANCK Hudson  Encounter Date: 10/27/2022   [facility] [address] [place] [address]     1  Paresthesia of both hands  Comments:  Advised taking breaks when working with hands, frequent stretching, increased hydration  Ultrasound as ordered  Orders:  -     US MSK limited; Future; Expected date: 10/27/2022    2  Chronic nasal congestion  Comments:  Advised steam, saline rinses twice daily, humidified air, avoid irritants  To restart Flonase daily and take daily allergy medication  Orders:  -     Ambulatory Referral to Otolaryngology; Future  -     fluticasone (FLONASE) 50 mcg/act nasal spray; 1 spray into each nostril daily    3  Coronary artery disease involving native heart, unspecified vessel or lesion type, unspecified whether angina present  Assessment & Plan:  S/p MI on 2019  Had occlusion 30% of LAD and 100% RAD  Follows with [facility] cardiology  Quit tobacco use, encouraged heart-healthy diet, BP managed  Denies any symptoms at this time  To continue with cardiology follow-up as scheduled  Orders:  -     Hemoglobin A1C; Future  -     Microalbumin / creatinine urine ratio; Future  -     CBC and Platelet; Future  -     Comprehensive metabolic panel; Future  -     TSH, 3rd generation with Free T4 reflex; Future    4  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, malignant [facility])  Assessment & Plan:  Follows with [facility] surgical oncology and hematology/oncology  Has upcoming appointment and is due for blood work and CT scan  On Gleevec  To continue with regular followups with specialist as scheduled  5  Mixed hyperlipidemia  -     Lipid panel; Future    6  Post-splenectomy  Assessment & Plan: Will need to get records from Infectious Disease to see which vaccinations he is due for    Get influenza vaccine today    Orders:  -     influenza vaccine, high-dose, PF 0 7 mL (FLUZONE HIGH-DOSE)    7  Screening for colorectal cancer  -     Ambulatory referral for Colonoscopy; Future    8  Need for influenza vaccination  -     influenza vaccine, high-dose, PF 0 7 mL (FLUZONE HIGH-DOSE)    9  Establishing care with new doctor, encounter for    BMI Counseling: Body mass index is 30 22 kg/m²  The BMI is above normal  Nutrition recommendations include decreasing portion sizes, encouraging healthy choices of fruits and vegetables, decreasing fast food intake, consuming healthier snacks, limiting drinks that contain sugar, moderation in carbohydrate intake, increasing intake of lean protein, reducing intake of saturated and trans fat and reducing intake of cholesterol  Exercise recommendations include strength training exercises  No pharmacotherapy was ordered  Rationale for BMI follow-up plan is due to patient being overweight or obese  Depression Screening and Follow-up Plan: Patient was screened for depression during today's encounter  They screened negative with a PHQ-2 score of 1  Subjective      Patient presents to the office for establishment of care  Was previously being seen by [facility], but provider retired and has not seen primary care since 2000  Quit tobacco 2019  Denies drug or alcohol use    Retired  Patient with history of MI on 05/16/2019  Was found to have 30% occlusion of LAD and 100% occlusion of RCA  Patient being followed by [facility]  Has appointment every 6 months  Patient is also 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 after discovery of pancreatic mass  Is currently on oral chemotherapy  Is followed by [facility] Surgical Oncology and heme/onc  Patient notes chronic nasal congestion  Patient states had evaluation by ENT, but is seeking for another ENT within [facility]  Notes right nasal passage more congested than left, also notes sinus pressure    Notes clogged sensation in right ear  Currently not taking medications for congestion  Denies exposure to secondhand smoke or history of seasonal allergies  Patient with complaints of bilateral hand tingling and numbness, has been occurring for a few months  As per patient right hand occurs more frequently than left hand  Patient notes tingling occurs after overuse of hands especially after grabbing onto objects for extended period time  Patient denies discoloration of hands, cold fingers or hand, decreased range of motion, inability to use hands, or weak   Patient denies chest pain, palpitations, headaches, dizziness, neck pain  Review of Systems   Constitutional: Negative for chills, fatigue, fever and unexpected weight change  HENT: Positive for congestion, hearing loss (diminished in right ear) and sinus pressure  Negative for ear pain, postnasal drip, rhinorrhea, sinus pain, sore throat and trouble swallowing  Eyes: Negative for photophobia and visual disturbance  Respiratory: Negative for cough, chest tightness and shortness of breath  Cardiovascular: Negative for chest pain, palpitations and leg swelling  Gastrointestinal: Negative for blood in stool, nausea and vomiting  Genitourinary: Negative for decreased urine volume, frequency, hematuria and urgency  Musculoskeletal: Negative for arthralgias and myalgias  Skin: Negative for color change and rash  Neurological: Positive for numbness (b/l hands R > L)  Negative for dizziness, speech difficulty, weakness, light-headedness and headaches  Psychiatric/Behavioral: Negative for confusion  The patient is not nervous/anxious          Current Outpatient Medications on File Prior to Visit   Medication Sig   • aspirin (ECOTRIN LOW STRENGTH) 81 mg EC tablet Take 1 tablet (81 mg total) by mouth daily   • atorvastatin (LIPITOR) 40 mg tablet Take 1 tablet (40 mg total) by mouth daily   • cetirizine (ZyrTEC) 10 mg tablet Take 10 mg by mouth daily • furosemide (LASIX) 20 mg tablet Take 1 tablet (20 mg total) by mouth daily   • imatinib (GLEEVEC) 400 mg tablet Take 400 mg by mouth daily   • metoprolol tartrate (LOPRESSOR) 25 mg tablet Take 1 tablet (25 mg total) by mouth every 12 (twelve) hours   • potassium chloride (K-DUR,KLOR-CON) 20 mEq tablet Take 1 tablet (20 mEq total) by mouth daily   • [DISCONTINUED] bifidobacterium infantis (ALIGN) capsule Take 2 tablets by mouth (Patient not taking: No sig reported)   • [DISCONTINUED] fluticasone (FLONASE) 50 mcg/act nasal spray 1 spray into each nostril daily (Patient not taking: Reported on 9/9/2022)   • [DISCONTINUED] Misc Natural Products (OSTEO BI-FLEX/5-LOXIN ADVANCED PO) Take by mouth (Patient not taking: No sig reported)   • [DISCONTINUED] predniSONE 20 mg tablet Take 20 mg by mouth daily (Patient not taking: No sig reported)       Objective     /84   Pulse 78   Temp 98 8 °F (37 1 °C)   Ht 5' 10" (1 778 m)   Wt 95 5 kg (210 lb 9 6 oz)   SpO2 98%   BMI 30 22 kg/m²     Physical Exam  Vitals reviewed  Constitutional:       General: He is not in acute distress  Appearance: Normal appearance  He is not ill-appearing  HENT:      Head: Normocephalic and atraumatic  Right Ear: Tympanic membrane, ear canal and external ear normal  There is no impacted cerumen  Left Ear: Tympanic membrane, ear canal and external ear normal  There is no impacted cerumen  Nose: Congestion present  Right Turbinates: Enlarged  Right Sinus: No maxillary sinus tenderness or frontal sinus tenderness  Left Sinus: No maxillary sinus tenderness or frontal sinus tenderness  Mouth/Throat:      Mouth: Mucous membranes are moist       Pharynx: Oropharynx is clear  No oropharyngeal exudate or posterior oropharyngeal erythema  Eyes:      Conjunctiva/sclera: Conjunctivae normal       Pupils: Pupils are equal, round, and reactive to light  Neck:      Vascular: No carotid bruit  Cardiovascular:      Rate and Rhythm: Normal rate and regular rhythm  Pulses: Normal pulses  Heart sounds: Normal heart sounds  No murmur heard  Pulmonary:      Effort: Pulmonary effort is normal       Breath sounds: Normal breath sounds  Abdominal:      General: A surgical scar is present  Bowel sounds are normal       Palpations: Abdomen is soft  Tenderness: There is no abdominal tenderness  There is no right CVA tenderness or left CVA tenderness  Musculoskeletal:         General: Normal range of motion  Cervical back: Normal range of motion and neck supple  Skin:     General: Skin is warm and dry  Capillary Refill: Capillary refill takes less than 2 seconds  Neurological:      General: No focal deficit present  Mental Status: He is alert and oriented to person, place, and time     Psychiatric:         Mood and Affect: Mood normal          Behavior: Behavior normal        FRANCK Ling

## 2022-11-01 ENCOUNTER — APPOINTMENT (OUTPATIENT)
Dept: LAB | Facility: CLINIC | Age: 67
End: 2022-11-01

## 2022-11-01 DIAGNOSIS — I25.10 CORONARY ARTERY DISEASE INVOLVING NATIVE HEART, UNSPECIFIED VESSEL OR LESION TYPE, UNSPECIFIED WHETHER ANGINA PRESENT: ICD-10-CM

## 2022-11-01 DIAGNOSIS — C49.A3 GASTROINTESTINAL STROMAL TUMOR OF SMALL INTESTINE (HCC): ICD-10-CM

## 2022-11-01 DIAGNOSIS — Z51.81 ENCOUNTER FOR THERAPEUTIC DRUG MONITORING: ICD-10-CM

## 2022-11-01 DIAGNOSIS — E78.2 MIXED HYPERLIPIDEMIA: ICD-10-CM

## 2022-11-01 LAB
ALBUMIN SERPL BCP-MCNC: 4.2 G/DL (ref 3.5–5)
ALP SERPL-CCNC: 86 U/L (ref 46–116)
ALT SERPL W P-5'-P-CCNC: 31 U/L (ref 12–78)
ANION GAP SERPL CALCULATED.3IONS-SCNC: 5 MMOL/L (ref 4–13)
AST SERPL W P-5'-P-CCNC: 17 U/L (ref 5–45)
BASOPHILS # BLD AUTO: 0.07 THOUSANDS/ÂΜL (ref 0–0.1)
BASOPHILS NFR BLD AUTO: 1 % (ref 0–1)
BILIRUB SERPL-MCNC: 0.78 MG/DL (ref 0.2–1)
BUN SERPL-MCNC: 17 MG/DL (ref 5–25)
CALCIUM SERPL-MCNC: 9.1 MG/DL (ref 8.3–10.1)
CHLORIDE SERPL-SCNC: 110 MMOL/L (ref 96–108)
CHOLEST SERPL-MCNC: 88 MG/DL
CO2 SERPL-SCNC: 28 MMOL/L (ref 21–32)
CREAT SERPL-MCNC: 1.39 MG/DL (ref 0.6–1.3)
CREAT UR-MCNC: 240 MG/DL
EOSINOPHIL # BLD AUTO: 0.52 THOUSAND/ÂΜL (ref 0–0.61)
EOSINOPHIL NFR BLD AUTO: 6 % (ref 0–6)
ERYTHROCYTE [DISTWIDTH] IN BLOOD BY AUTOMATED COUNT: 14.4 % (ref 11.6–15.1)
EST. AVERAGE GLUCOSE BLD GHB EST-MCNC: 128 MG/DL
GFR SERPL CREATININE-BSD FRML MDRD: 51 ML/MIN/1.73SQ M
GLUCOSE P FAST SERPL-MCNC: 123 MG/DL (ref 65–99)
HBA1C MFR BLD: 6.1 %
HCT VFR BLD AUTO: 46.7 % (ref 36.5–49.3)
HDLC SERPL-MCNC: 28 MG/DL
HGB BLD-MCNC: 14.8 G/DL (ref 12–17)
IMM GRANULOCYTES # BLD AUTO: 0.02 THOUSAND/UL (ref 0–0.2)
IMM GRANULOCYTES NFR BLD AUTO: 0 % (ref 0–2)
LDLC SERPL CALC-MCNC: 27 MG/DL (ref 0–100)
LYMPHOCYTES # BLD AUTO: 2.57 THOUSANDS/ÂΜL (ref 0.6–4.47)
LYMPHOCYTES NFR BLD AUTO: 30 % (ref 14–44)
MCH RBC QN AUTO: 33.7 PG (ref 26.8–34.3)
MCHC RBC AUTO-ENTMCNC: 31.7 G/DL (ref 31.4–37.4)
MCV RBC AUTO: 106 FL (ref 82–98)
MICROALBUMIN UR-MCNC: 32.1 MG/L (ref 0–20)
MICROALBUMIN/CREAT 24H UR: 13 MG/G CREATININE (ref 0–30)
MONOCYTES # BLD AUTO: 1.34 THOUSAND/ÂΜL (ref 0.17–1.22)
MONOCYTES NFR BLD AUTO: 16 % (ref 4–12)
NEUTROPHILS # BLD AUTO: 4.13 THOUSANDS/ÂΜL (ref 1.85–7.62)
NEUTS SEG NFR BLD AUTO: 47 % (ref 43–75)
NONHDLC SERPL-MCNC: 60 MG/DL
NRBC BLD AUTO-RTO: 0 /100 WBCS
PLATELET # BLD AUTO: 371 THOUSANDS/UL (ref 149–390)
PMV BLD AUTO: 10.2 FL (ref 8.9–12.7)
POTASSIUM SERPL-SCNC: 3.9 MMOL/L (ref 3.5–5.3)
PROT SERPL-MCNC: 6.9 G/DL (ref 6.4–8.4)
RBC # BLD AUTO: 4.39 MILLION/UL (ref 3.88–5.62)
SODIUM SERPL-SCNC: 143 MMOL/L (ref 135–147)
TRIGL SERPL-MCNC: 163 MG/DL
TSH SERPL DL<=0.05 MIU/L-ACNC: 2.37 UIU/ML (ref 0.45–4.5)
WBC # BLD AUTO: 8.65 THOUSAND/UL (ref 4.31–10.16)

## 2022-11-03 ENCOUNTER — TELEPHONE (OUTPATIENT)
Dept: CARDIOLOGY CLINIC | Facility: CLINIC | Age: 67
End: 2022-11-03

## 2022-11-03 DIAGNOSIS — R06.02 SHORTNESS OF BREATH: ICD-10-CM

## 2022-11-03 RX ORDER — POTASSIUM CHLORIDE 20 MEQ/1
20 TABLET, EXTENDED RELEASE ORAL DAILY
Qty: 90 TABLET | Refills: 3 | Status: SHIPPED | OUTPATIENT
Start: 2022-11-03 | End: 2022-11-11 | Stop reason: SDUPTHER

## 2022-11-03 NOTE — TELEPHONE ENCOUNTER
Name of Caller:Ladarius    Call back [address]    Medication(s):Pottassium    Are we prescribing provider?:    30 or 90 day supply:    Pharmacy name[facility]number:[facility] [address] [place] [address] or Next appt?:

## 2022-11-10 ENCOUNTER — OFFICE VISIT (OUTPATIENT)
Dept: FAMILY MEDICINE CLINIC | Facility: CLINIC | Age: 67
End: 2022-11-10

## 2022-11-10 VITALS
SYSTOLIC BLOOD PRESSURE: 128 MMHG | WEIGHT: 214.2 LBS | HEART RATE: 80 BPM | OXYGEN SATURATION: 98 % | TEMPERATURE: 98.5 F | DIASTOLIC BLOOD PRESSURE: 78 MMHG | HEIGHT: 70 IN | BODY MASS INDEX: 30.67 KG/M2

## 2022-11-10 DIAGNOSIS — Z87.891 FORMER HEAVY TOBACCO SMOKER: ICD-10-CM

## 2022-11-10 DIAGNOSIS — R73.03 PREDIABETES: ICD-10-CM

## 2022-11-10 DIAGNOSIS — N18.31 STAGE 3A CHRONIC KIDNEY DISEASE (HCC): Primary | ICD-10-CM

## 2022-11-10 DIAGNOSIS — Z00.00 WELCOME TO MEDICARE PREVENTIVE VISIT: ICD-10-CM

## 2022-11-10 RX ORDER — LISINOPRIL 2.5 MG/1
2.5 TABLET ORAL DAILY
Qty: 90 TABLET | Refills: 1 | Status: SHIPPED | OUTPATIENT
Start: 2022-11-10

## 2022-11-10 NOTE — ASSESSMENT & PLAN NOTE
Lab Results   Component Value Date    EGFR 51 11/01/2022    EGFR 48 07/15/2022    EGFR 56 08/28/2020    CREATININE 1 39 (H) 11/01/2022    CREATININE 1 46 (H) 07/15/2022    CREATININE 1 31 (H) 08/28/2020     Patient denies decreased urine output  Blood pressure is controlled in office today  Discussed with patient increased hydration, decreasing sodium intake, blood pressure management  Will initiate lisinopril 2 5 mg to be taken daily  Additional blood work ordered, to return in 1-2 months for re-evaluation

## 2022-11-10 NOTE — PROGRESS NOTES
Assessment and Plan:     Problem List Items Addressed This Visit        Genitourinary    Stage 3a chronic kidney disease [facility]) - Primary     Lab Results   Component Value Date    EGFR 51 11/01/2022    EGFR 48 07/15/2022    EGFR 56 08/28/2020    CREATININE 1 39 (H) 11/01/2022    CREATININE 1 46 (H) 07/15/2022    CREATININE 1 31 (H) 08/28/2020     Patient denies decreased urine output  Blood pressure is controlled in office today  Discussed with patient increased hydration, decreasing sodium intake, blood pressure management  Will initiate lisinopril 2 5 mg to be taken daily  Additional blood work ordered, to return in 1-2 months for re-evaluation  Relevant Medications    lisinopril (ZESTRIL) 2 5 mg tablet    Other Relevant Orders    Renal function panel    Magnesium    Phosphorus    PTH, intact    Vitamin D 25 hydroxy    Comprehensive metabolic panel    Urinalysis with microscopic    Microalbumin / creatinine urine ratio       Other    Prediabetes     Discussed with patient importance of limiting carbohydrate intake, increasing physical activity, and maintaining healthy weight  Other Visit Diagnoses     Former heavy tobacco smoker        Relevant Orders    CT lung screening program    Welcome to Medicare preventive visit              Lung Cancer Screening Shared Decision Making: I discussed with him that he is a candidate for lung cancer CT screening  The following Shared Decision-Making points were covered:  1  Benefits of screening were discussed, including the rates of reduction in death from lung cancer and other causes  Harms of screening were reviewed, including false positive tests, radiation exposure levels, risks of invasive procedures, risks of complications of screening, and risk of overdiagnosis  2  I counseled on the importance of adherence to annual lung cancer LDCT screening, impact of co-morbidities, and ability or willingness to undergo diagnosis and treatment    3  I counseled on the importance of maintaining abstinence as a former smoker or was counseled on the importance of smoking cessation if a current smoker    Review of Eligibility Criteria: He meets all of the criteria for Lung Cancer Screening    - He is 69 y o    - He has 20 pack year tobacco history and is a current smoker or has quit within the past 15 years  - He presents no signs or symptoms of lung cancer    After discussion, the patient decided to elect lung cancer screening  Preventive health issues were discussed with patient, and age appropriate screening tests were ordered as noted in patient's After Visit Summary  Personalized health advice and appropriate referrals for health education or preventive services given if needed, as noted in patient's After Visit Summary  History of Present Illness:     Patient presents for a Medicare Wellness Visit     Recent blood work reviewed with patient  Patient denies any complaints at this time  Notes his health is generally well  Patient denies chest pain, shortness a breath, palpitations  Denies decreased urine production, flank pain, hematuria  Patient has upcoming abdominal scan ordered through [address] [place] [address]  Patient Care Team:  Lyssa Alcaraz as PCP - General (Family Medicine)     Review of Systems:     Review of Systems   Constitutional: Negative for activity change, appetite change and unexpected weight change  HENT: Negative for congestion, ear pain and sore throat  Eyes: Negative for photophobia and visual disturbance  Respiratory: Negative for cough, chest tightness and shortness of breath  Cardiovascular: Negative for chest pain, palpitations and leg swelling  Gastrointestinal: Negative for abdominal pain, blood in stool, constipation, diarrhea, nausea and vomiting  Endocrine: Negative for polydipsia, polyphagia and polyuria     Genitourinary: Negative for decreased urine volume, difficulty urinating, dysuria, flank pain, frequency, hematuria and urgency  Musculoskeletal: Negative for arthralgias, back pain and myalgias  Skin: Negative for color change and rash  Neurological: Negative for dizziness, syncope, weakness, light-headedness and headaches  Psychiatric/Behavioral: Negative for agitation, confusion and sleep disturbance  The patient is not nervous/anxious  Problem List:     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, malignant (HCC)   • CAD (coronary artery disease)   • Stage 3a chronic kidney disease ([facility] [address] )   • Prediabetes      Past Medical and Surgical History:     History reviewed  No pertinent past medical history  History reviewed  No pertinent surgical history  Family History:     Family History   Problem Relation Age of Onset   • Alzheimer's disease Father       Social History:     Social History     Socioeconomic History   • Marital status: /Civil Union     Spouse name: None   • Number of children: None   • Years of education: None   • Highest education level: None   Occupational History   • None   Tobacco Use   • Smoking status: Former Smoker     Quit date: 5/12/2019     Years since quitting: 3 5   • Smokeless tobacco: Never Used   Vaping Use   • Vaping Use: Never used   Substance and Sexual Activity   • Alcohol use: Never   • Drug use: Never   • Sexual activity: Never   Other Topics Concern   • None   Social History Narrative   • None     Social Determinants of Health     Financial Resource Strain: Low Risk    • Difficulty of Paying Living Expenses: Not very hard   Food Insecurity: Not on file   Transportation Needs: No Transportation Needs   • Lack of Transportation (Medical): No   • Lack of Transportation (Non-Medical):  No   Physical Activity: Not on file Stress: Not on file   Social Connections: Not on file   Intimate Partner Violence: Not on file   Housing Stability: Not on file      Medications and Allergies:     Current Outpatient Medications   Medication Sig Dispense Refill   • aspirin (ECOTRIN LOW STRENGTH) 81 mg EC tablet Take 1 tablet (81 mg total) by mouth daily 30 tablet 0   • atorvastatin (LIPITOR) 40 mg tablet Take 1 tablet (40 mg total) by mouth daily 90 tablet 3   • cetirizine (ZyrTEC) 10 mg tablet Take 10 mg by mouth daily     • fluticasone (FLONASE) 50 mcg/act nasal spray 1 spray into each nostril daily 18 2 mL 1   • furosemide (LASIX) 20 mg tablet Take 1 tablet (20 mg total) by mouth daily 90 tablet 3   • imatinib (GLEEVEC) 400 mg tablet Take 400 mg by mouth daily     • lisinopril (ZESTRIL) 2 5 mg tablet Take 1 tablet (2 5 mg total) by mouth daily 90 tablet 1   • metoprolol tartrate (LOPRESSOR) 25 mg tablet Take 1 tablet (25 mg total) by mouth every 12 (twelve) hours 180 tablet 3   • potassium chloride (K-DUR,KLOR-CON) 20 mEq tablet Take 1 tablet (20 mEq total) by mouth daily 90 tablet 3     No current facility-administered medications for this visit       No Known Allergies   Immunizations:     Immunization History   Administered Date(s) Administered   • COVID-19 MODERNA VACC 0 5 ML IM 05/05/2021, 06/09/2021   • COVID-19, unspecified 05/05/2021, 06/09/2021   • HiB 08/13/2021   • INFLUENZA 11/29/2021   • Influenza, Seasonal Vaccine, Quadrivalent, Adjuvanted,  5e 11/29/2021   • Influenza, high dose seasonal 0 7 mL 10/27/2022   • Meningococcal Polysaccharide (Groups A, C, Y, W-135) Tetae 11/18/2021   • Meningococcal Polysaccharide (MPSV4) 11/18/2021   • Pneumococcal Conjugate 13-Valent 07/07/2021   • Pneumococcal Polysaccharide PPV23 09/29/2021      Health Maintenance:         Topic Date Due   • Colorectal Cancer Screening  Never done   • Lung Cancer Screening  Never done   • Hepatitis C Screening  Completed         Topic Date Due   • HIB Vaccine (1 of 1 - Risk 1-dose series) 10/02/1956   • COVID-19 Vaccine (3 - Booster) 11/09/2021   • Meningococcal ACWY Vaccine (1 - Risk start 2-23 months series) 01/13/2022      Medicare Screening Tests and Risk Assessments:     Carlos Pires is here for his Welcome to Medicare visit  Health Risk Assessment:   Patient rates overall health as good  Patient feels that their physical health rating is same  Patient is satisfied with their life  Eyesight was rated as same  Hearing was rated as slightly worse  Patient feels that their emotional and mental health rating is same  Patients states they are never, rarely angry  Patient states they are often unusually tired/fatigued  Pain experienced in the last 7 days has been none  Patient states that he has experienced no weight loss or gain in last 6 months  Fall Risk Screening: In the past year, patient has experienced: no history of falling in past year      Home Safety:  Patient does not have trouble with stairs inside or outside of their home  Patient has working smoke alarms and has working carbon monoxide detector  Home safety hazards include: none  Nutrition:   Current diet is Regular  Medications:   Patient is not currently taking any over-the-counter supplements  Patient is able to manage medications  Activities of Daily Living (ADLs)/Instrumental Activities of Daily Living (IADLs):   Walk and transfer into and out of bed and chair?: Yes  Dress and groom yourself?: Yes    Bathe or shower yourself?: Yes    Feed yourself?  Yes  Do your laundry/housekeeping?: Yes  Manage your money, pay your bills and track your expenses?: Yes  Make your own meals?: Yes    Do your own shopping?: Yes    Previous Hospitalizations:   Any hospitalizations or ED visits within the last 12 months?: No      Advance Care Planning:   Living will: No    Durable POA for healthcare: Yes      Cognitive Screening:   Provider or family/friend/caregiver concerned regarding cognition?: No    PREVENTIVE SCREENINGS      Cardiovascular Screening:    General: Screening Not Indicated and History Lipid Disorder      Diabetes Screening:     General: Screening Current      Colorectal Cancer Screening:       Due for: Colonoscopy - Low Risk      Osteoporosis Screening:    General: Screening Not Indicated      Abdominal Aortic Aneurysm (AAA) Screening:    Risk factors include: age between 73-67 yo and tobacco use        Lung Cancer Screening:     General: Screening Not Indicated      Hepatitis C Screening:    General: Screening Current    Screening, Brief Intervention, and Referral to Treatment (SBIRT)    Screening  Typical number of drinks in a day: 0  Typical number of drinks in a week: 0  Interpretation: Low risk drinking behavior  Other Counseling Topics:   Car/seat belt/driving safety, skin self-exam, sunscreen and regular weightbearing exercise and calcium and vitamin D intake  No exam data present     Physical Exam:     /78   Pulse 80   Temp 98 5 °F (36 9 °C)   Ht 5' 10" (1 778 m)   Wt 97 2 kg (214 lb 3 2 oz)   SpO2 98%   BMI 30 73 kg/m²     Physical Exam  Vitals reviewed  Constitutional:       General: He is not in acute distress  Appearance: Normal appearance  He is not ill-appearing  HENT:      Head: Normocephalic and atraumatic  Right Ear: Tympanic membrane, ear canal and external ear normal       Left Ear: Tympanic membrane, ear canal and external ear normal       Nose: Nose normal       Mouth/Throat:      Mouth: Mucous membranes are moist       Pharynx: Oropharynx is clear  Eyes:      Conjunctiva/sclera: Conjunctivae normal       Pupils: Pupils are equal, round, and reactive to light  Neck:      Vascular: No carotid bruit  Cardiovascular:      Rate and Rhythm: Normal rate and regular rhythm  Pulses: Normal pulses  Heart sounds: Normal heart sounds  No murmur heard    Pulmonary:      Effort: Pulmonary effort is normal       Breath sounds: Normal breath sounds  Abdominal:      General: Bowel sounds are normal       Palpations: Abdomen is soft  Tenderness: There is no abdominal tenderness  There is no right CVA tenderness or left CVA tenderness  Musculoskeletal:         General: Normal range of motion  Cervical back: Normal range of motion and neck supple  Right lower leg: No edema  Left lower leg: No edema  Lymphadenopathy:      Cervical: No cervical adenopathy  Skin:     General: Skin is warm and dry  Capillary Refill: Capillary refill takes less than 2 seconds  Neurological:      General: No focal deficit present  Mental Status: He is alert and oriented to person, place, and time     Psychiatric:         Mood and Affect: Mood normal          Behavior: Behavior normal           FRANCK Membreno

## 2022-11-10 NOTE — ASSESSMENT & PLAN NOTE
Discussed with patient importance of limiting carbohydrate intake, increasing physical activity, and maintaining healthy weight

## 2022-11-10 NOTE — PATIENT INSTRUCTIONS
Prediabetes   AMBULATORY CARE:   Prediabetes  is a blood glucose (sugar) level that is higher than normal  It is not high enough to be considered diabetes  Prediabetes increases your risk for type 2 diabetes and heart disease, especially if you have high blood pressure or high cholesterol  The following increase your risk for prediabetes:   Being overweight or obese, with a body mass index (BMI) of 25 or higher (23 or higher if you are  American)    Lack of physical activity    Older age    Family history of diabetes (parent or sibling)    A history of heart disease, gestational diabetes, or polycystic ovary syndrome (PCOS)    High blood pressure or cholesterol levels    Being [place] American, , , [place] American, or     In children, having a mother with diabetes or gestational diabetes mellitus (GDM) during the pregnancy    Signs and symptoms:  Prediabetes may not cause any symptoms  Call your doctor if:   You have more hunger or thirst than usual     You are urinating more often than usual     You are always exhausted  You have blurred vision  You have questions or concerns about your condition or care  Prevent or delay type 2 diabetes:  Healthy choices work best to delay or prevent type 2 diabetes  You may be given the following guidelines from your healthcare provider:  Get regular physical activity  Adults should get at least 150 minutes (2 5 hours) of moderate physical activity every week  Spread the amount of activity over at least 3 days a week  Do not skip more than 2 days in a row  Children should get at least 60 minutes of moderate physical activity on most days of the week  Examples of moderate physical activity include brisk walking, running, and swimming  Do not sit for longer than 30 minutes at a time  Work with your healthcare provider to create a plan for physical activity  Lose weight if you are overweight    A weight loss of 7% of your body weight can help  Your healthcare provider can tell you what weight is healthy for you  He or she can help you create a weight loss plan  Eat healthy foods  Eat a variety of fruits and vegetables  Eat whole-grain foods more often  Choose dairy foods, meat, and other protein foods that are low in fat  Eat fewer sweets, such as candy, cookies, regular soda, and sweetened drinks  You can also decrease calories by eating smaller portion sizes  Work with your healthcare provider or dietitian to develop a meal plan that is right for you  Take medicine as directed  Your healthcare provider may give diabetes medicine if you are at high risk for diabetes  You may also need medicines for high blood pressure and high cholesterol  Follow up with your healthcare provider as directed  You will need to return every year to get tested for diabetes  Do not smoke  Do not use e-cigarettes or smokeless tobacco in place of cigarettes or to help you quit  They still contain nicotine  Ask your healthcare provider for information if you currently smoke and need help quitting  Start with small goals and work your way up  You may need to start with 3 days of physical activity  You can add a day after 3 weeks or so of activity  Make a goal of losing 5 pounds at first  Swap a piece of fruit for dessert or sweets  Start with 2 fruits in a week and increase it weekly  These are suggestions  Talk with healthcare providers about making a plan that is right for you  Follow up with your doctor as directed: If you do have prediabetes, you will need to return every year to get tested for diabetes  Write down your questions so you remember to ask them during your visits  © Copyright [employer] 2022 Information is for End User's use only and may not be sold, redistributed or otherwise used for commercial purposes   All illustrations and images included in CareNotes® are the copyrighted property of [employer]  or [address]  The above information is an  only  It is not intended as medical advice for individual conditions or treatments  Talk to your doctor, nurse or pharmacist before following any medical regimen to see if it is safe and effective for you

## 2022-11-11 DIAGNOSIS — R06.02 SHORTNESS OF BREATH: ICD-10-CM

## 2022-11-11 RX ORDER — POTASSIUM CHLORIDE 20 MEQ/1
20 TABLET, EXTENDED RELEASE ORAL DAILY
Qty: 90 TABLET | Refills: 3 | Status: SHIPPED | OUTPATIENT
Start: 2022-11-11

## 2022-11-11 NOTE — TELEPHONE ENCOUNTER
Patient came into the office today requesting this prescription be filled  He states he called a couple weeks ago but the order was never filled      [address] day  [address]  Last seen 9/9 /22

## 2023-01-12 ENCOUNTER — OFFICE VISIT (OUTPATIENT)
Dept: AUDIOLOGY | Facility: CLINIC | Age: 68
End: 2023-01-12

## 2023-01-12 ENCOUNTER — OFFICE VISIT (OUTPATIENT)
Dept: OTOLARYNGOLOGY | Facility: CLINIC | Age: 68
End: 2023-01-12

## 2023-01-12 VITALS
HEIGHT: 70 IN | BODY MASS INDEX: 30.06 KG/M2 | WEIGHT: 210 LBS | DIASTOLIC BLOOD PRESSURE: 84 MMHG | TEMPERATURE: 98.1 F | SYSTOLIC BLOOD PRESSURE: 130 MMHG

## 2023-01-12 DIAGNOSIS — H90.A11 CONDUCTIVE HEARING LOSS OF RIGHT EAR WITH RESTRICTED HEARING OF LEFT EAR: ICD-10-CM

## 2023-01-12 DIAGNOSIS — H65.21 RIGHT CHRONIC SEROUS OTITIS MEDIA: ICD-10-CM

## 2023-01-12 DIAGNOSIS — H93.8X1 CLOGGED EAR, RIGHT: Primary | ICD-10-CM

## 2023-01-12 DIAGNOSIS — H90.3 SENSORINEURAL HEARING LOSS (SNHL) OF BOTH EARS: ICD-10-CM

## 2023-01-12 DIAGNOSIS — H90.A31 MIXED CONDUCTIVE AND SENSORINEURAL HEARING LOSS OF RIGHT EAR WITH RESTRICTED HEARING OF LEFT EAR: Primary | ICD-10-CM

## 2023-01-12 DIAGNOSIS — J34.89 SINUS PRESSURE: ICD-10-CM

## 2023-01-12 DIAGNOSIS — H93.8X1 CLOGGED EAR, RIGHT: ICD-10-CM

## 2023-01-12 NOTE — H&P (VIEW-ONLY)
Assessment/Plan:  Right chronic serous otitis media, with associated conductive hearing loss  Pt scheduled for right M&T  Diagnosis ICD-10-CM Associated Orders   1  Clogged ear, right  H93 8X1 Ambulatory referral to Audiology      2  Right chronic serous otitis media  H65 21       3  Sinus pressure  J34 89       4  Sensorineural hearing loss (SNHL) of both ears  H90 3       5  Conductive hearing loss of right ear with restricted hearing of left ear  H90  A11              Subjective:      Patient ID: Charissa Hawkins is a 69 y o  male  Pt with c/o clogged right ear and clogged sinuses on the right x 1 yr  He saw another ENT five times, who was not able to help him  The following portions of the patient's history were reviewed and updated as appropriate: allergies, current medications, past family history, past medical history, past social history, past surgical history and problem list     Review of Systems      Objective:      /84 (BP Location: Left arm, Patient Position: Sitting, Cuff Size: Adult)   Temp 98 1 °F (36 7 °C) (Temporal)   Ht 5' 10" (1 778 m)   Wt 95 3 kg (210 lb)   BMI 30 13 kg/m²          Physical Exam  Constitutional:       Appearance: He is well-developed  HENT:      Head: Normocephalic and atraumatic  Right Ear: Ear canal and external ear normal  No drainage  A middle ear effusion is present  Left Ear: Tympanic membrane, ear canal and external ear normal  No drainage  No middle ear effusion  Nose: Nose normal       Mouth/Throat:      Pharynx: Uvula midline  No oropharyngeal exudate  Tonsils: 1+ on the right  1+ on the left  Neck:      Thyroid: No thyroid mass or thyromegaly  Trachea: Trachea normal  No tracheal deviation  Cardiovascular:      Heart sounds: Normal heart sounds, S1 normal and S2 normal    Pulmonary:      Breath sounds: Normal breath sounds and air entry     Abdominal:      General: Bowel sounds are normal       Palpations: Abdomen is soft  Lymphadenopathy:      Cervical: No cervical adenopathy  Neurological:      Mental Status: He is alert  Flexible Fiberoptic Nasal Endoscopy Procedure Note:  Indication:  Clogged right ear, sinus pressure  Verbal consent obtained  Surgeon: Milagro Mae MD  Anesthesia: 4% lidocaine, oxymetazoline  Scope passed through nasal cavities bilaterally    Nasopharynx: normal  Right Nasal Cavity:   Mucosa: normal   Secretions: clear  Left Nasal Cavity:   Mucosa: normal   Secretions: clear  Other findings = none  Patient tolerated procedure well without complications    Audiogram:  AD: mild to profound MHL  AS: mild to severe SNHL  Tympanogram:  AD: type B  AS: type A

## 2023-01-12 NOTE — PROGRESS NOTES
ENT HEARING EVALUATION    Name:  Carolynne Dubin  :  1955  Age:  69 y o  Date of Evaluation: 23     History: ENT Audiogram / Long term R ear issues   Reason for visit: Carolynne Dubin is being seen today at the request of Dr Jason Connors for an evaluation of hearing as part of their initial ENT visit  EVALUATION:    Otoscopic Evaluation:   Right Ear: narrow canal, scant wet wax    Left Ear: narrow canal, scant wet wax     Tympanometry:   Right: Type B - middle ear disorder  Suspected    Left: Type A - normal middle ear pressure and compliance    Audiogram Results:  L ear: Within or bordering normal limits, 250-2000 Hz, sloping to a moderate to severe HFSN loss, 2296-7773 Hz  R ear:  Mild / moderate mixed hearing loss 250-2000 Hz, sloping to a severe to profound degree, 0166-5545 Hz  Word recognition scores, in quiet, were obtained at 88% for the right ear and 100% for the left ear at patient's [facility]'s  *see attached audiogram for configuration    RECOMMENDATIONS:  1  Follow up per Dr Neal Monet  2   Repeat audiogram upon completion of all otologic intervention     Marie Bhatia , [facility], [place]# [id number]  Clinical Audiologist

## 2023-01-12 NOTE — PROGRESS NOTES
Assessment/Plan:  Right chronic serous otitis media, with associated conductive hearing loss  Pt scheduled for right M&T  Diagnosis ICD-10-CM Associated Orders   1  Clogged ear, right  H93 8X1 Ambulatory referral to Audiology      2  Right chronic serous otitis media  H65 21       3  Sinus pressure  J34 89       4  Sensorineural hearing loss (SNHL) of both ears  H90 3       5  Conductive hearing loss of right ear with restricted hearing of left ear  H90  A11              Subjective:      Patient ID: Chris Lynch is a 69 y o  male  Pt with c/o clogged right ear and clogged sinuses on the right x 1 yr  He saw another ENT five times, who was not able to help him  The following portions of the patient's history were reviewed and updated as appropriate: allergies, current medications, past family history, past medical history, past social history, past surgical history and problem list     Review of Systems      Objective:      /84 (BP Location: Left arm, Patient Position: Sitting, Cuff Size: Adult)   Temp 98 1 °F (36 7 °C) (Temporal)   Ht 5' 10" (1 778 m)   Wt 95 3 kg (210 lb)   BMI 30 13 kg/m²          Physical Exam  Constitutional:       Appearance: He is well-developed  HENT:      Head: Normocephalic and atraumatic  Right Ear: Ear canal and external ear normal  No drainage  A middle ear effusion is present  Left Ear: Tympanic membrane, ear canal and external ear normal  No drainage  No middle ear effusion  Nose: Nose normal       Mouth/Throat:      Pharynx: Uvula midline  No oropharyngeal exudate  Tonsils: 1+ on the right  1+ on the left  Neck:      Thyroid: No thyroid mass or thyromegaly  Trachea: Trachea normal  No tracheal deviation  Cardiovascular:      Heart sounds: Normal heart sounds, S1 normal and S2 normal    Pulmonary:      Breath sounds: Normal breath sounds and air entry     Abdominal:      General: Bowel sounds are normal       Palpations: Abdomen is soft  Lymphadenopathy:      Cervical: No cervical adenopathy  Neurological:      Mental Status: He is alert  Flexible Fiberoptic Nasal Endoscopy Procedure Note:  Indication:  Clogged right ear, sinus pressure  Verbal consent obtained  Surgeon: Rocio Moeller MD  Anesthesia: 4% lidocaine, oxymetazoline  Scope passed through nasal cavities bilaterally    Nasopharynx: normal  Right Nasal Cavity:   Mucosa: normal   Secretions: clear  Left Nasal Cavity:   Mucosa: normal   Secretions: clear  Other findings = none  Patient tolerated procedure well without complications    Audiogram:  AD: mild to profound MHL  AS: mild to severe SNHL  Tympanogram:  AD: type B  AS: type A

## 2023-01-16 ENCOUNTER — APPOINTMENT (OUTPATIENT)
Dept: LAB | Facility: CLINIC | Age: 68
End: 2023-01-16

## 2023-01-16 DIAGNOSIS — N18.31 STAGE 3A CHRONIC KIDNEY DISEASE (HCC): ICD-10-CM

## 2023-01-16 LAB
25(OH)D3 SERPL-MCNC: 15.8 NG/ML (ref 30–100)
ALBUMIN SERPL BCP-MCNC: 4.3 G/DL (ref 3.5–5)
ALP SERPL-CCNC: 103 U/L (ref 46–116)
ALT SERPL W P-5'-P-CCNC: 37 U/L (ref 12–78)
ANION GAP SERPL CALCULATED.3IONS-SCNC: 7 MMOL/L (ref 4–13)
AST SERPL W P-5'-P-CCNC: 23 U/L (ref 5–45)
BACTERIA UR QL AUTO: ABNORMAL /HPF
BILIRUB SERPL-MCNC: 0.78 MG/DL (ref 0.2–1)
BILIRUB UR QL STRIP: NEGATIVE
BUN SERPL-MCNC: 15 MG/DL (ref 5–25)
CALCIUM SERPL-MCNC: 9.1 MG/DL (ref 8.3–10.1)
CHLORIDE SERPL-SCNC: 112 MMOL/L (ref 96–108)
CLARITY UR: ABNORMAL
CO2 SERPL-SCNC: 27 MMOL/L (ref 21–32)
COLOR UR: ABNORMAL
CREAT SERPL-MCNC: 1.48 MG/DL (ref 0.6–1.3)
CREAT UR-MCNC: 354 MG/DL
GFR SERPL CREATININE-BSD FRML MDRD: 47 ML/MIN/1.73SQ M
GLUCOSE P FAST SERPL-MCNC: 116 MG/DL (ref 65–99)
GLUCOSE UR STRIP-MCNC: NEGATIVE MG/DL
HGB UR QL STRIP.AUTO: NEGATIVE
HYALINE CASTS #/AREA URNS LPF: ABNORMAL /LPF
KETONES UR STRIP-MCNC: NEGATIVE MG/DL
LEUKOCYTE ESTERASE UR QL STRIP: NEGATIVE
MAGNESIUM SERPL-MCNC: 2.4 MG/DL (ref 1.6–2.6)
MICROALBUMIN UR-MCNC: 111 MG/L (ref 0–20)
MICROALBUMIN/CREAT 24H UR: 31 MG/G CREATININE (ref 0–30)
MUCOUS THREADS UR QL AUTO: ABNORMAL
NITRITE UR QL STRIP: NEGATIVE
NON-SQ EPI CELLS URNS QL MICRO: ABNORMAL /HPF
PH UR STRIP.AUTO: 5.5 [PH]
PHOSPHATE SERPL-MCNC: 1.6 MG/DL (ref 2.3–4.1)
POTASSIUM SERPL-SCNC: 4.4 MMOL/L (ref 3.5–5.3)
PROT SERPL-MCNC: 7.4 G/DL (ref 6.4–8.4)
PROT UR STRIP-MCNC: ABNORMAL MG/DL
PTH-INTACT SERPL-MCNC: 81.3 PG/ML (ref 18.4–80.1)
RBC #/AREA URNS AUTO: ABNORMAL /HPF
SODIUM SERPL-SCNC: 146 MMOL/L (ref 135–147)
SP GR UR STRIP.AUTO: 1.02 (ref 1–1.03)
UROBILINOGEN UR STRIP-ACNC: <2 MG/DL
WBC #/AREA URNS AUTO: ABNORMAL /HPF

## 2023-01-16 NOTE — PRE-PROCEDURE INSTRUCTIONS
My Surgical Experience    The following information was developed to assist you to prepare for your operation  What do I need to do before coming to the hospital?  • Arrange for a responsible person to drive you to and from the hospital   • Arrange care for your children at home  Children are not allowed in the recovery areas of the hospital  • Plan to wear clothing that is easy to put on and take off  If you are having shoulder surgery, wear a shirt that buttons or zippers in the front  Bathing  o Shower the evening before and the morning of your surgery with an antibacterial soap  Please refer to the Pre Op Showering Instructions for Surgery Patients Sheet   o Remove nail polish and all body piercing jewelry  o Do not shave any body part for at least 24 hours before surgery-this includes face, arms, legs and upper body  Food  o Nothing to eat or drink after midnight the night before your surgery  This includes candy and chewing gum  o Exception: If your surgery is after 12:00pm (noon), you may have clear liquids such as 7-Up®, ginger ale, apple or cranberry juice, Jell-O®, water, or clear broth until 8:00 am  o Do not drink milk or juice with pulp on the morning before surgery  o Do not drink alcohol 24 hours before surgery  Medicine  o Follow instructions you received from your surgeon about which medicines you may take on the day of surgery  o If instructed to take medicine on the morning of surgery, take pills with just a small sip of water  Call your prescribing doctor for specific infroamtion on what to do if you take insulin    What should I bring to the hospital?    Bring:  • Crutches or a walker, if you have them, for foot or knee surgery  • A list of the daily medicines, vitamins, minerals, herbals and nutritional supplements you take   Include the dosages of medicines and the time you take them each day  • Glasses, dentures or hearing aids  • Minimal clothing; you will be wearing hospital sleepwear  • Photo ID; required to verify your identity  • If you have a Living Will or Power of , bring a copy of the documents  • If you have an ostomy, bring an extra pouch and any supplies you use    Do not bring  • Medicines or inhalers  • Money, valuables or jewelry    What other information should I know about the day of surgery? • Notify your surgeons if you develop a cold, sore throat, cough, fever, rash or any other illness  • Report to the Ambulatory Surgical/Same Day Surgery Unit  • You will be instructed to stop at Registration only if you have not been pre-registered  • Inform your  fi they do not stay that they will be asked by the staff to leave a phone number where they can be reached  • Be available to be reached before surgery  In the event the operating room schedule changes, you may be asked to come in earlier or later than expected    *It is important to tell your doctor and others involved in your health care if you are taking or have been taking any non-prescription drugs, vitamins, minerals, herbals or other nutritional supplements  Any of these may interact with some food or medicines and cause a reaction      Pre-Surgery Instructions:   Medication Instructions   • aspirin (ECOTRIN LOW STRENGTH) 81 mg EC tablet Hold day of surgery  • atorvastatin (LIPITOR) 40 mg tablet Hold day of surgery  • cetirizine (ZyrTEC) 10 mg tablet Hold day of surgery  • fluticasone (FLONASE) 50 mcg/act nasal spray Take night before surgery   • furosemide (LASIX) 20 mg tablet Hold day of surgery  • imatinib (GLEEVEC) 400 mg tablet Hold day of surgery  • lisinopril (ZESTRIL) 2 5 mg tablet Hold day of surgery  • metoprolol tartrate (LOPRESSOR) 25 mg tablet Take day of surgery  • potassium chloride (K-DUR,KLOR-CON) 20 mEq tablet Hold day of surgery

## 2023-01-18 ENCOUNTER — OFFICE VISIT (OUTPATIENT)
Dept: FAMILY MEDICINE CLINIC | Facility: CLINIC | Age: 68
End: 2023-01-18

## 2023-01-18 ENCOUNTER — TELEPHONE (OUTPATIENT)
Dept: NEPHROLOGY | Facility: CLINIC | Age: 68
End: 2023-01-18

## 2023-01-18 VITALS
HEIGHT: 70 IN | TEMPERATURE: 98.3 F | HEART RATE: 76 BPM | BODY MASS INDEX: 30.35 KG/M2 | SYSTOLIC BLOOD PRESSURE: 128 MMHG | WEIGHT: 212 LBS | OXYGEN SATURATION: 95 % | DIASTOLIC BLOOD PRESSURE: 82 MMHG

## 2023-01-18 DIAGNOSIS — N18.31 STAGE 3A CHRONIC KIDNEY DISEASE (HCC): Primary | ICD-10-CM

## 2023-01-18 DIAGNOSIS — Z12.11 SCREENING FOR COLON CANCER: ICD-10-CM

## 2023-01-18 DIAGNOSIS — C49.A3 GIST (GASTROINTESTINAL STROMAL TUMOR) OF SMALL BOWEL, MALIGNANT (HCC): ICD-10-CM

## 2023-01-18 DIAGNOSIS — E55.9 VITAMIN D DEFICIENCY: ICD-10-CM

## 2023-01-18 RX ORDER — ERGOCALCIFEROL 1.25 MG/1
50000 CAPSULE ORAL WEEKLY
Qty: 8 CAPSULE | Refills: 0 | Status: SHIPPED | OUTPATIENT
Start: 2023-01-18 | End: 2023-03-09

## 2023-01-18 NOTE — PATIENT INSTRUCTIONS
Chronic Kidney Disease Diet   WHAT YOU NEED TO KNOW:   What is a chronic kidney disease (CKD) diet? A CKD diet limits protein, phosphorus, sodium, and potassium  Liquids may also need to be limited in later stages of CKD  This diet can help slow down the rate of damage to your kidneys  Your diet may change over time as your health condition changes  You may also need to make other diet changes if you have other health problems, such as diabetes  What kind of diet changes are needed? There are 5 stages of CKD  The diet changes you need to make are based on your stage of kidney disease  Work with your dietitian or healthcare provider to plan meals that are right for you  You may need any of the following:  Limit protein  in all stages of kidney disease  Limit the portion sizes of protein you eat to limit the amount of work your kidneys have to do  Foods that are high in protein are meat, poultry (chicken and turkey), fish, eggs, and dairy (milk, cheese, yogurt)  Your healthcare provider will tell you how much protein to eat each day  Limit sodium  as directed  You may need to limit sodium to less than 2,300 milligrams (mg) each day  Ask your dietitian or healthcare provider how much sodium you can have each day  The amount depends on your stage of kidney disease  Table salt, canned foods, soups, salted snacks, and processed meats, like deli meats and sausage, are high in sodium  Limit the amount of phosphorus  you eat  Your kidneys cannot get rid of extra phosphorus that builds up in your blood  This may cause your bones to lose calcium and weaken  Foods that are high in phosphorus are dairy products, beans, peas, nuts, and whole grains  Phosphorus is also found in cocoa, beer, and cola drinks  Your healthcare provider will tell you how much phosphorus you can have each day  Limit potassium  if your potassium blood levels are too high   Your dietitian or healthcare provider will tell you if you need to limit potassium  Potassium is found in fruits and vegetables  Limit liquids  as directed  Your healthcare provider may recommend that you limit liquids in stages 4 and 5 of kidney disease  If your body retains fluids, you will have swelling and fluid may build up in your lungs  This can cause other health problems, such as shortness of breath  What foods can I include? Your dietitian will tell you how many servings you can have from each of the food groups below  The approximate amount of these nutrients is listed next to each food group  Read the food label to find the exact amount  Bread, cereal, and grains: These foods contain about 80 calories, 2 grams (g) of protein, 150 mg of sodium, 50 mg of potassium, and 30 mg of phosphorus  1 slice (1 ounce) of bread ([place], [place], raisin, light rye, or sourdough white), small dinner roll, or 6-inch tortilla    ½ of a hamburger bun, hot dog bun, or English muffin or ¼ of a bagel    1 cup of unsweetened cereal or ½ cup of cooked cereal, such as cream of wheat    ? cup of cooked pasta (noodles, macaroni, or spaghetti) or rice    4 (2-inch) unsalted crackers or 3 squares of mesfin crackers    3 cups of air-popped, unsalted popcorn    ¾ ounce of unsalted pretzels    Vegetables:  A serving of these foods contains about 30 calories, 2 g of protein, 50 mg of sodium, and 50 mg of phosphorus      Low potassium (less than 150 mg):      ½ cup cooked green beans, cabbage, cauliflower, beets, or corn    1 cup raw cucumber, endive, alfalfa sprouts, cabbage, cauliflower, or watercress    1 cup of all types of lettuce    ¼ cup cooked or ½ cup raw mushrooms or onions    1 cup cooked eggplant    Medium potassium (150 to 250 mg):      1 cup raw broccoli, celery, or zucchini    ½ cup cooked broccoli, celery, green peas, summer squash, zucchini, or peppers    1 cup cooked kale or turnips    Fruits:  A serving of these foods contains about 60 calories, 0 g protein, 0 mg sodium, and 150 mg of phosphorus  Each serving is ½ cup, unless another amount is given  Low potassium (less than 150 mg): Apple juice, applesauce, or 1 small apple    Blueberries    Cranberries or cranberry juice cocktail    Fresh or canned pears (light syrup or packed in water)    Grapes or grape juice    Canned peaches (light syrup or packed in water)    Pineapple or strawberries    1 tangerine    Watermelon    Medium potassium (150 to 250 mg):      Fresh peaches or pears    Cherries    Cantaloupe, zoltan, or papaya    Grapefruit or grapefruit juice    Meat, poultry, and fish: These foods have about 75 calories, 7 g of protein, an average of 65 mg of sodium, 115 mg of potassium, and 70 mg of phosphorus  Do not use salt to prepare these foods  1 ounce of cooked beef, pork, or poultry    1 ounce of any fresh or frozen fish, lobster, shrimp, crab, clams, tuna, unsalted canned salmon, or unsalted sardines    Other protein foods: These foods have about 90 calories, 7 g of protein, an average of 100 mg of sodium, 100 mg of potassium, and 120 mg of phosphorus  1 large whole egg or ¼ cup of low-cholesterol egg substitute    1 ounce of cheese    ¼ cup of cottage cheese or tofu    1 ounce of unsalted nuts or 2 tablespoons of peanut butter    Fats: These foods have very little protein and about 45 calories, 55 mg of sodium, 10 mg of potassium, and 5 mg of phosphorus  Include healthy fats, such as unsaturated fats, which are listed below  1 teaspoon margarine or mayonnaise    1 teaspoon oil (safflower, sunflower, corn, soybean, olive, peanut, canola)    1 tablespoon oil-based salad dressing (such as [place]) or 2 tablespoons mayonnaise-based salad dressing (such as ranch)    What foods should I limit or avoid? Starches: The following foods have higher amounts of sodium, potassium, or phosphorus      Biscuits, muffins, pancakes, and waffles    Cake and cornbread from boxed mixes    Oatmeal and whole-wheat cereals    Salted pretzel sticks or rings and sandwich cookies    Meat and protein foods: The following are high in sodium and phosphorus  Deli-style meat, such as roast beef, ham, and turkey    Canned salmon and sardines    Processed cheese, such as American cheese and cheese spreads    Smoked or cured meat, such as corned beef, serna, ham, hot dogs, and sausage    Legumes: These foods have about 90 calories, 6 g of protein, less than 10 mg of sodium, 250 mg of potassium, and 100 mg of phosphorus  ? cup of black beans, red kidney beans, black-eye peas, garbanzos, and lentils    ¼ cup of green or mature soybeans    Dairy: The following foods have about 8 g of protein, an average of 120 mg of sodium, 350 mg of potassium, and 220 mg of phosphorus  1 cup of milk (fat-free, low-fat, whole, buttermilk, or chocolate milk)    1 cup of low-fat plain or sugar-free yogurt or ice cream    ½ cup of pudding or custard    Nondairy milk substitutes: These foods have 75 calories, 1 gram of protein, and an average of 40 mg of sodium, 60 mg of potassium, and 60 mg of phosphorus  A serving is ½ cup of almond, rice, or soy milk, or nondairy creamer  Vegetables: The following vegetables are high in potassium  Each serving has more than 250 mg of potassium  A serving is ½ cup, unless another amount is given  Artichoke or ¼ of a medium avocado    [place] sprouts, beets, chard, caity or mustard greens    Potatoes, sweet potatoes, pumpkin, and yams    ¾ cup of okra    Raw tomatoes and low-sodium tomato juice, or tomato sauce    Winter squash, cooked asparagus, and cooked spinach    Fruit:  The following fruits are high in potassium  Each serving has more than 250 mg of potassium      3 fresh apricots    1 small nectarine (2 inches across)    1 small orange and ½ cup of orange juice    ¼ cup of dates    ? of a small honeydew melon    1 six-inch banana    ½ cup of prune juice or prunes and kiwifruit    Fats:  Limit unhealthy fats, such as saturated fats, which are listed below  Butter, lard, cream cheese, whipped cream, and sour cream    Powdered coffee creamer    Other: The following foods are high in sodium  Frozen dinners, soups, and fast foods, such as hamburgers and pizza (see the food label for serving sizes)    Table salt and seasoned salts, such as onion or garlic salt    Barbecue sauce, ketchup, mustard, soy sauce, steak sauce, and teriyaki sauce    When should I call my nephrologist or dietitian? You are gaining or losing weight very quickly  You have shortness of breath  You have nausea and are vomiting  You feel very weak and tired  You are having trouble following the CKD diet  CARE AGREEMENT:   You have the right to help plan your care  Discuss treatment options with your healthcare provider to decide what care you want to receive  You always have the right to refuse treatment  The above information is an  only  It is not intended as medical advice for individual conditions or treatments  Talk to your doctor, nurse or pharmacist before following any medical regimen to see if it is safe and effective for you  © Copyright [employer] 2022 Information is for End User's use only and may not be sold, redistributed or otherwise used for commercial purposes   All illustrations and images included in CareNotes® are the copyrighted property of [employer]  or [address] [place] [address] [employer]

## 2023-01-18 NOTE — PROGRESS NOTES
Name: Jacob Yoon      : 1955      MRN: [id number]  Encounter Provider: FRANCK Bailey  Encounter Date: 2023   Encounter department: Kacey [facility] [address] [place] [address]  Stage 3a chronic kidney disease [facility])  Assessment & Plan:  Lab Results   Component Value Date    EGFR 47 2023    EGFR 51 2022    EGFR 48 07/15/2022    CREATININE 1 48 (H) 2023    CREATININE 1 39 (H) 2022    CREATININE 1 46 (H) 07/15/2022     Recent labs reviewed with patient  Patient denies symptoms  Discussed importance of adequate hydration, blood pressure control  To continue on lisinopril 2 5 mg daily  Will correct vitamin D deficiency  Will refer to nephrology for further evaluation  Orders:  -     ergocalciferol (VITAMIN D2) 50,000 units; Take 1 capsule (50,000 Units total) by mouth once a week for 8 doses  -     Ambulatory Referral to Nephrology; Future    2  Vitamin D deficiency  Assessment & Plan:  Discussed options of vitamin D intake including dietary intake and healthy exposure to sun light  We will give loading dose of vitamin D, to be taken weekly  Made patient aware after 8-week dose is done, can take over-the-counter vitamin D 1000 to 2000 units daily  Orders:  -     ergocalciferol (VITAMIN D2) 50,000 units; Take 1 capsule (50,000 Units total) by mouth once a week for 8 doses    3  Screening for colon cancer  -     Ambulatory referral for colonoscopy; Future    4  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 oncology as scheduled  Subjective      Patient presents to the office for follow-up after blood work  Recent blood work reviewed with patient  Patient denies symptoms related to decreased kidney function  Denies decreased urine production, flank pain, hematuria  Has been taking lisinopril 2 5 mg daily    Notes compliance with medication  Review of Systems   Constitutional: Negative for activity change, appetite change, chills and fatigue  HENT: Negative for sore throat and trouble swallowing  Eyes: Negative for photophobia  Respiratory: Negative for cough and shortness of breath  Cardiovascular: Negative for chest pain and palpitations  Gastrointestinal: Negative for abdominal pain, blood in stool, nausea and vomiting  Genitourinary: Negative for decreased urine volume, dysuria, flank pain, hematuria and urgency  Musculoskeletal: Negative for arthralgias, back pain and myalgias  Skin: Negative for color change and rash  Neurological: Negative for dizziness, weakness, light-headedness and headaches  Psychiatric/Behavioral: Negative for confusion  Current Outpatient Medications on File Prior to Visit   Medication Sig   • aspirin (ECOTRIN LOW STRENGTH) 81 mg EC tablet Take 1 tablet (81 mg total) by mouth daily   • atorvastatin (LIPITOR) 40 mg tablet Take 1 tablet (40 mg total) by mouth daily (Patient taking differently: Take 20 mg by mouth daily at bedtime)   • cetirizine (ZyrTEC) 10 mg tablet Take 10 mg by mouth daily   • fluticasone (FLONASE) 50 mcg/act nasal spray 1 spray into each nostril daily   • furosemide (LASIX) 20 mg tablet Take 1 tablet (20 mg total) by mouth daily   • imatinib (GLEEVEC) 400 mg tablet Take 400 mg by mouth daily   • lisinopril (ZESTRIL) 2 5 mg tablet Take 1 tablet (2 5 mg total) by mouth daily   • metoprolol tartrate (LOPRESSOR) 25 mg tablet Take 1 tablet (25 mg total) by mouth every 12 (twelve) hours   • potassium chloride (K-DUR,KLOR-CON) 20 mEq tablet Take 1 tablet (20 mEq total) by mouth daily       Objective     /82 (BP Location: Left arm, Patient Position: Sitting, Cuff Size: Standard)   Pulse 76   Temp 98 3 °F (36 8 °C) (Tympanic)   Ht 5' 10" (1 778 m)   Wt 96 2 kg (212 lb)   SpO2 95%   BMI 30 42 kg/m²     Physical Exam  Vitals reviewed     Constitutional: General: He is not in acute distress  Appearance: Normal appearance  He is not ill-appearing  HENT:      Head: Normocephalic and atraumatic  Right Ear: Tympanic membrane, ear canal and external ear normal       Left Ear: Tympanic membrane, ear canal and external ear normal       Nose: Nose normal       Mouth/Throat:      Mouth: Mucous membranes are moist       Pharynx: Oropharynx is clear  Eyes:      Conjunctiva/sclera: Conjunctivae normal       Pupils: Pupils are equal, round, and reactive to light  Cardiovascular:      Rate and Rhythm: Normal rate and regular rhythm  Pulses: Normal pulses  Heart sounds: Normal heart sounds  No murmur heard  Pulmonary:      Effort: Pulmonary effort is normal       Breath sounds: Normal breath sounds  Abdominal:      General: Bowel sounds are normal       Palpations: Abdomen is soft  Tenderness: There is no abdominal tenderness  There is no right CVA tenderness or left CVA tenderness  Musculoskeletal:         General: Normal range of motion  Cervical back: Normal range of motion and neck supple  Right lower leg: No edema  Left lower leg: No edema  Skin:     General: Skin is warm and dry  Capillary Refill: Capillary refill takes less than 2 seconds  Neurological:      General: No focal deficit present  Mental Status: He is alert and oriented to person, place, and time     Psychiatric:         Mood and Affect: Mood normal          Behavior: Behavior normal        FRANCK Weller

## 2023-01-18 NOTE — ASSESSMENT & PLAN NOTE
Lab Results   Component Value Date    EGFR 47 01/16/2023    EGFR 51 11/01/2022    EGFR 48 07/15/2022    CREATININE 1 48 (H) 01/16/2023    CREATININE 1 39 (H) 11/01/2022    CREATININE 1 46 (H) 07/15/2022     Recent labs reviewed with patient  Patient denies symptoms  Discussed importance of adequate hydration, blood pressure control  To continue on lisinopril 2 5 mg daily  Will correct vitamin D deficiency  Will refer to nephrology for further evaluation

## 2023-01-18 NOTE — ASSESSMENT & PLAN NOTE
Discussed options of vitamin D intake including dietary intake and healthy exposure to sun light  We will give loading dose of vitamin D, to be taken weekly  Made patient aware after 8-week dose is done, can take over-the-counter vitamin D 1000 to 2000 units daily

## 2023-01-18 NOTE — TELEPHONE ENCOUNTER
I called patient to schedule a Nephrology Consult Ref by  Dr Kristian London for Stage 3a chronic kidney disease [facility] as per patient he will be calling back to schedule appointment

## 2023-01-19 ENCOUNTER — HOSPITAL ENCOUNTER (OUTPATIENT)
Facility: HOSPITAL | Age: 68
Setting detail: OUTPATIENT SURGERY
Discharge: HOME | End: 2023-01-19
Attending: OTOLARYNGOLOGY | Admitting: OTOLARYNGOLOGY

## 2023-01-19 ENCOUNTER — ANESTHESIA (OUTPATIENT)
Dept: PERIOP | Facility: HOSPITAL | Age: 68
End: 2023-01-19

## 2023-01-19 ENCOUNTER — ANESTHESIA EVENT (OUTPATIENT)
Dept: PERIOP | Facility: HOSPITAL | Age: 68
End: 2023-01-19

## 2023-01-19 VITALS
TEMPERATURE: 98.2 F | BODY MASS INDEX: 29.95 KG/M2 | RESPIRATION RATE: 18 BRPM | WEIGHT: 209.2 LBS | HEART RATE: 67 BPM | HEIGHT: 70 IN | SYSTOLIC BLOOD PRESSURE: 124 MMHG | DIASTOLIC BLOOD PRESSURE: 58 MMHG | OXYGEN SATURATION: 94 %

## 2023-01-19 DEVICE — VENT TUBE 1056165 5PK PLAIN FLPL .89X7: Type: IMPLANTABLE DEVICE | Site: EAR | Status: FUNCTIONAL

## 2023-01-19 RX ORDER — SODIUM CHLORIDE, SODIUM LACTATE, POTASSIUM CHLORIDE, CALCIUM CHLORIDE 600; 310; 30; 20 MG/100ML; MG/100ML; MG/100ML; MG/100ML
125 INJECTION, SOLUTION INTRAVENOUS CONTINUOUS
Status: DISCONTINUED | OUTPATIENT
Start: 2023-01-19 | End: 2023-01-19 | Stop reason: HOSPADM

## 2023-01-19 RX ORDER — OFLOXACIN 3 MG/ML
SOLUTION/ DROPS OPHTHALMIC AS NEEDED
Status: DISCONTINUED | OUTPATIENT
Start: 2023-01-19 | End: 2023-01-19 | Stop reason: HOSPADM

## 2023-01-19 RX ORDER — ONDANSETRON 2 MG/ML
4 INJECTION INTRAMUSCULAR; INTRAVENOUS ONCE AS NEEDED
Status: DISCONTINUED | OUTPATIENT
Start: 2023-01-19 | End: 2023-01-19 | Stop reason: HOSPADM

## 2023-01-19 RX ORDER — ONDANSETRON 2 MG/ML
INJECTION INTRAMUSCULAR; INTRAVENOUS AS NEEDED
Status: DISCONTINUED | OUTPATIENT
Start: 2023-01-19 | End: 2023-01-19

## 2023-01-19 RX ORDER — FENTANYL CITRATE/PF 50 MCG/ML
25 SYRINGE (ML) INJECTION
Status: DISCONTINUED | OUTPATIENT
Start: 2023-01-19 | End: 2023-01-19 | Stop reason: HOSPADM

## 2023-01-19 RX ORDER — LIDOCAINE HYDROCHLORIDE 10 MG/ML
INJECTION, SOLUTION EPIDURAL; INFILTRATION; INTRACAUDAL; PERINEURAL AS NEEDED
Status: DISCONTINUED | OUTPATIENT
Start: 2023-01-19 | End: 2023-01-19

## 2023-01-19 RX ORDER — FENTANYL CITRATE 50 UG/ML
INJECTION, SOLUTION INTRAMUSCULAR; INTRAVENOUS AS NEEDED
Status: DISCONTINUED | OUTPATIENT
Start: 2023-01-19 | End: 2023-01-19

## 2023-01-19 RX ORDER — PROPOFOL 10 MG/ML
INJECTION, EMULSION INTRAVENOUS AS NEEDED
Status: DISCONTINUED | OUTPATIENT
Start: 2023-01-19 | End: 2023-01-19

## 2023-01-19 RX ADMIN — ONDANSETRON 4 MG: 2 INJECTION INTRAMUSCULAR; INTRAVENOUS at 14:47

## 2023-01-19 RX ADMIN — PROPOFOL 150 MG: 10 INJECTION, EMULSION INTRAVENOUS at 14:40

## 2023-01-19 RX ADMIN — SODIUM CHLORIDE, SODIUM LACTATE, POTASSIUM CHLORIDE, AND CALCIUM CHLORIDE 125 ML/HR: .6; .31; .03; .02 INJECTION, SOLUTION INTRAVENOUS at 14:10

## 2023-01-19 RX ADMIN — LIDOCAINE HYDROCHLORIDE 50 MG: 10 INJECTION, SOLUTION EPIDURAL; INFILTRATION; INTRACAUDAL; PERINEURAL at 14:40

## 2023-01-19 RX ADMIN — FENTANYL CITRATE 50 MCG: 50 INJECTION, SOLUTION INTRAMUSCULAR; INTRAVENOUS at 14:43

## 2023-01-19 NOTE — ANESTHESIA PREPROCEDURE EVALUATION
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, malignant (HCC)      /RENAL   (+) Stage 3a chronic kidney disease (HCC)      NEURO/PSYCH   (+) Claudication (HCC)        Physical Exam    Airway    Mallampati score: II  TM Distance: >3 FB  Neck ROM: full     Dental   No notable dental hx     Cardiovascular  Cardiovascular exam normal    Pulmonary  Pulmonary exam normal     Other Findings        Anesthesia Plan  ASA Score- 3     Anesthesia Type- general with ASA Monitors  Additional Monitors:   Airway Plan: LMA  Plan Factors-Exercise tolerance (METS): >4 METS  Chart reviewed  Imaging results reviewed  Existing labs reviewed  Patient summary reviewed  Patient is not a current smoker  Induction- intravenous  Postoperative Plan- Plan for postoperative opioid use  Informed Consent- Anesthetic plan and risks discussed with patient  I personally reviewed this patient with the CRNA  Discussed and agreed on the Anesthesia Plan with the CRNA  Cathie Blackmon

## 2023-01-19 NOTE — ANESTHESIA POSTPROCEDURE EVALUATION
Post-Op Assessment Note    CV Status:  Stable  Pain Score: 0    Pain management: adequate     Mental Status:  Alert and awake   Hydration Status:  Stable   PONV Controlled:  None   Airway Patency:  Patent      Post Op Vitals Reviewed: Yes      Staff: Anesthesiologist, CRNA         No notable events documented      BP   112/58   Temp      Pulse  70   Resp   14   SpO2   98

## 2023-01-19 NOTE — INTERVAL H&P NOTE
H&P reviewed  After examining the patient I find no changes in the patients condition since the H&P had been written      Vitals:    01/19/23 1347   BP: 136/65   Pulse: 92   Resp: 18   Temp: 97 9 °F (36 6 °C)   SpO2: 93%

## 2023-01-19 NOTE — OP NOTE
OPERATIVE REPORT  PATIENT NAME: Gray Gunderson    :  1955  MRN: [id number]  Pt Location: [facility] OR ROOM 02    SURGERY DATE: 2023    Surgeon(s) and Role:     * Ginette Hager MD - Primary    Preop Diagnosis:  Clogged ear, right [H93 8X1]  Right chronic serous otitis media [H65 21]  Sensorineural hearing loss (SNHL) of both ears [H90 3]  Conductive hearing loss of right ear with restricted hearing of left ear [H90  A11]    Post-Op Diagnosis Codes:     * Clogged ear, right [H93 8X1]     * Right chronic serous otitis media [H65 21]     * Sensorineural hearing loss (SNHL) of both ears [H90 3]     * Conductive hearing loss of right ear with restricted hearing of left ear [H90  A11]    Procedure(s) (LRB):  MYRINGOTOMY W/ INSERTION VENTILATION TUBE EAR (Right)    Specimen(s):  * No specimens in log *    Estimated Blood Loss:   Minimal    Drains:  * No LDAs found *    Anesthesia Type:   General    Operative Indications:  Clogged ear, right [H93 8X1]  Right chronic serous otitis media [H65 21]  Sensorineural hearing loss (SNHL) of both ears [H90 3]  Conductive hearing loss of right ear with restricted hearing of left ear [H90  A11]      Operative Findings:  Right serous otitis media    Complications:   None    Procedure and Technique:  After induction of general anesthesia via LMA, the patient was draped in the sterile fashion  The right external auditory canal was viewed using the operating microscope  An anteroinferior myringotomy was performed, and serous effusion was suctioned from the middle ear  A straight shank myringotomy tube was inserted  The ear canal was irrigated with ofloxacin drops  At the end of the procedure, all instruments were removed       I was present for the entire procedure    Patient Disposition:  PACU         SIGNATURE: Ginette Hager MD  DATE: 2023  TIME: 2:55 PM

## 2023-01-19 NOTE — PROGRESS NOTES
Patient returned from PACU  He is alert and oriented lying on stretcher  Vitals Signs WNL and patient denies any pain or complaints at this time  Refreshments given; family en route to bedside   Luis Ashby

## 2023-01-19 NOTE — PERIOPERATIVE NURSING NOTE
Patient tolerating PO fluids, IV access removed  AVS reviewed with patient and wife  All questions and concerns addressed  Patient discharged via wheelchair with family

## 2023-01-25 ENCOUNTER — TELEPHONE (OUTPATIENT)
Dept: NEPHROLOGY | Facility: CLINIC | Age: 68
End: 2023-01-25

## 2023-01-25 NOTE — TELEPHONE ENCOUNTER
I called and left message on patients answering machine for patient to give the offie a call bacl about scheduling  a Nephrology Consult Ref by  Dr Alem Breaux for Stage 3a chronic kidney disease ([facility]

## 2023-01-26 ENCOUNTER — TELEPHONE (OUTPATIENT)
Dept: NEPHROLOGY | Facility: CLINIC | Age: 68
End: 2023-01-26

## 2023-01-26 DIAGNOSIS — N18.30 STAGE 3 CHRONIC KIDNEY DISEASE, UNSPECIFIED WHETHER STAGE 3A OR 3B CKD (HCC): Primary | ICD-10-CM

## 2023-01-26 NOTE — TELEPHONE ENCOUNTER
Pt called and LM to schedule an appt  Called pt back and scheduled a consult for CKD3 referred byDr Silas Ugarte on 03/15  Please advise if pt has to be seen sooner and CKD labs ordered  Thank you

## 2023-02-02 ENCOUNTER — OFFICE VISIT (OUTPATIENT)
Dept: OTOLARYNGOLOGY | Facility: CLINIC | Age: 68
End: 2023-02-02

## 2023-02-02 VITALS — TEMPERATURE: 97.6 F | BODY MASS INDEX: 30.06 KG/M2 | WEIGHT: 210 LBS | HEIGHT: 70 IN

## 2023-02-02 DIAGNOSIS — Z96.22 S/P MYRINGOTOMY WITH INSERTION OF TUBE: Primary | ICD-10-CM

## 2023-02-02 NOTE — PROGRESS NOTES
Assessment/Plan:  Right myringotomy in place and patent  F/u in 3 months  Diagnosis ICD-10-CM Associated Orders   1  S/P myringotomy with insertion of tube  Z96 22              Subjective:      Patient ID: Jacob Yoon is a 69 y o  male  2 wks s/p right M&T  No c/o  Right ear feels much better  The following portions of the patient's history were reviewed and updated as appropriate: allergies, current medications, past family history, past medical history, past social history, past surgical history and problem list     Review of Systems      Objective:      Temp 97 6 °F (36 4 °C) (Temporal)   Ht 5' 10" (1 778 m)   Wt 95 3 kg (210 lb)   BMI 30 13 kg/m²          Physical Exam  HENT:      Right Ear: Tympanic membrane, ear canal and external ear normal  A PE tube is present        Left Ear: Tympanic membrane, ear canal and external ear normal

## 2023-03-06 ENCOUNTER — TELEPHONE (OUTPATIENT)
Dept: NEPHROLOGY | Facility: CLINIC | Age: 68
End: 2023-03-06

## 2023-03-09 ENCOUNTER — APPOINTMENT (OUTPATIENT)
Dept: LAB | Facility: CLINIC | Age: 68
End: 2023-03-09

## 2023-03-09 DIAGNOSIS — N18.30 STAGE 3 CHRONIC KIDNEY DISEASE, UNSPECIFIED WHETHER STAGE 3A OR 3B CKD (HCC): ICD-10-CM

## 2023-03-09 LAB
25(OH)D3 SERPL-MCNC: 48.8 NG/ML (ref 30–100)
ANION GAP SERPL CALCULATED.3IONS-SCNC: 8 MMOL/L (ref 4–13)
BACTERIA UR QL AUTO: ABNORMAL /HPF
BASOPHILS # BLD AUTO: 0.07 THOUSANDS/ÂΜL (ref 0–0.1)
BASOPHILS NFR BLD AUTO: 1 % (ref 0–1)
BILIRUB UR QL STRIP: NEGATIVE
BUN SERPL-MCNC: 23 MG/DL (ref 5–25)
CALCIUM SERPL-MCNC: 9 MG/DL (ref 8.3–10.1)
CHLORIDE SERPL-SCNC: 109 MMOL/L (ref 96–108)
CLARITY UR: ABNORMAL
CO2 SERPL-SCNC: 25 MMOL/L (ref 21–32)
COLOR UR: YELLOW
CREAT SERPL-MCNC: 1.51 MG/DL (ref 0.6–1.3)
CREAT UR-MCNC: 250 MG/DL
EOSINOPHIL # BLD AUTO: 0.38 THOUSAND/ÂΜL (ref 0–0.61)
EOSINOPHIL NFR BLD AUTO: 5 % (ref 0–6)
ERYTHROCYTE [DISTWIDTH] IN BLOOD BY AUTOMATED COUNT: 14.3 % (ref 11.6–15.1)
GFR SERPL CREATININE-BSD FRML MDRD: 46 ML/MIN/1.73SQ M
GLUCOSE P FAST SERPL-MCNC: 122 MG/DL (ref 65–99)
GLUCOSE UR STRIP-MCNC: NEGATIVE MG/DL
HCT VFR BLD AUTO: 44 % (ref 36.5–49.3)
HGB BLD-MCNC: 14.6 G/DL (ref 12–17)
HGB UR QL STRIP.AUTO: NEGATIVE
HYALINE CASTS #/AREA URNS LPF: ABNORMAL /LPF
IMM GRANULOCYTES # BLD AUTO: 0.02 THOUSAND/UL (ref 0–0.2)
IMM GRANULOCYTES NFR BLD AUTO: 0 % (ref 0–2)
KETONES UR STRIP-MCNC: NEGATIVE MG/DL
LEUKOCYTE ESTERASE UR QL STRIP: NEGATIVE
LYMPHOCYTES # BLD AUTO: 2.33 THOUSANDS/ÂΜL (ref 0.6–4.47)
LYMPHOCYTES NFR BLD AUTO: 29 % (ref 14–44)
MCH RBC QN AUTO: 34.8 PG (ref 26.8–34.3)
MCHC RBC AUTO-ENTMCNC: 33.2 G/DL (ref 31.4–37.4)
MCV RBC AUTO: 105 FL (ref 82–98)
MONOCYTES # BLD AUTO: 1.14 THOUSAND/ÂΜL (ref 0.17–1.22)
MONOCYTES NFR BLD AUTO: 14 % (ref 4–12)
MUCOUS THREADS UR QL AUTO: ABNORMAL
NEUTROPHILS # BLD AUTO: 4.21 THOUSANDS/ÂΜL (ref 1.85–7.62)
NEUTS SEG NFR BLD AUTO: 51 % (ref 43–75)
NITRITE UR QL STRIP: NEGATIVE
NON-SQ EPI CELLS URNS QL MICRO: ABNORMAL /HPF
NRBC BLD AUTO-RTO: 0 /100 WBCS
PH UR STRIP.AUTO: 6 [PH]
PHOSPHATE SERPL-MCNC: 2 MG/DL (ref 2.3–4.1)
PLATELET # BLD AUTO: 425 THOUSANDS/UL (ref 149–390)
PMV BLD AUTO: 9.6 FL (ref 8.9–12.7)
POTASSIUM SERPL-SCNC: 3.8 MMOL/L (ref 3.5–5.3)
PROT UR STRIP-MCNC: ABNORMAL MG/DL
PROT UR-MCNC: 46 MG/DL
PROT/CREAT UR: 0.18 MG/G{CREAT} (ref 0–0.1)
PTH-INTACT SERPL-MCNC: 83.2 PG/ML (ref 18.4–80.1)
RBC # BLD AUTO: 4.2 MILLION/UL (ref 3.88–5.62)
RBC #/AREA URNS AUTO: ABNORMAL /HPF
SODIUM SERPL-SCNC: 142 MMOL/L (ref 135–147)
SP GR UR STRIP.AUTO: 1.03 (ref 1–1.03)
UROBILINOGEN UR STRIP-ACNC: <2 MG/DL
WBC # BLD AUTO: 8.15 THOUSAND/UL (ref 4.31–10.16)
WBC #/AREA URNS AUTO: ABNORMAL /HPF

## 2023-03-14 ENCOUNTER — TELEPHONE (OUTPATIENT)
Dept: NEPHROLOGY | Facility: CLINIC | Age: 68
End: 2023-03-14

## 2023-03-15 ENCOUNTER — CONSULT (OUTPATIENT)
Dept: NEPHROLOGY | Facility: CLINIC | Age: 68
End: 2023-03-15

## 2023-03-15 VITALS
OXYGEN SATURATION: 98 % | DIASTOLIC BLOOD PRESSURE: 70 MMHG | SYSTOLIC BLOOD PRESSURE: 100 MMHG | BODY MASS INDEX: 31.21 KG/M2 | RESPIRATION RATE: 16 BRPM | TEMPERATURE: 97.9 F | WEIGHT: 218 LBS | HEIGHT: 70 IN | HEART RATE: 78 BPM

## 2023-03-15 DIAGNOSIS — N18.9 CHRONIC KIDNEY DISEASE-MINERAL AND BONE DISORDER: ICD-10-CM

## 2023-03-15 DIAGNOSIS — M89.9 CHRONIC KIDNEY DISEASE-MINERAL AND BONE DISORDER: ICD-10-CM

## 2023-03-15 DIAGNOSIS — I25.10 CORONARY ARTERY DISEASE INVOLVING NATIVE HEART, UNSPECIFIED VESSEL OR LESION TYPE, UNSPECIFIED WHETHER ANGINA PRESENT: Primary | ICD-10-CM

## 2023-03-15 DIAGNOSIS — I50.33 ACUTE ON CHRONIC DIASTOLIC (CONGESTIVE) HEART FAILURE (HCC): ICD-10-CM

## 2023-03-15 DIAGNOSIS — N18.31 STAGE 3A CHRONIC KIDNEY DISEASE (HCC): ICD-10-CM

## 2023-03-15 DIAGNOSIS — C49.A3 GIST (GASTROINTESTINAL STROMAL TUMOR) OF SMALL BOWEL, MALIGNANT (HCC): ICD-10-CM

## 2023-03-15 DIAGNOSIS — E83.9 CHRONIC KIDNEY DISEASE-MINERAL AND BONE DISORDER: ICD-10-CM

## 2023-03-15 NOTE — ASSESSMENT & PLAN NOTE
Lab Results   Component Value Date    EGFR 46 03/09/2023    EGFR 47 01/16/2023    EGFR 51 11/01/2022    CREATININE 1 51 (H) 03/09/2023    CREATININE 1 48 (H) 01/16/2023    CREATININE 1 39 (H) 11/01/2022   Patient does seems to have CKD based on past data  Etiology unclear  It may multifactorial with possible coronary disease and need for IV contrast because of cancer    Pathophysiology of kidney disease discussed at length with the patient  Importance of hydration and avoiding nephrotoxic medicine also discussed with him    Asymptomatic and progressive nature of kidney disease also discussed with him    At this point I will repeat blood and urine test   Patient had CT scan of abdomen done which did not show any abnormality in kidney except couple of cyst which I discussed with him    Patient is also on low-dose of lisinopril which I will continue

## 2023-03-15 NOTE — ASSESSMENT & PLAN NOTE
Lab Results   Component Value Date    EGFR 46 03/09/2023    EGFR 47 01/16/2023    EGFR 51 11/01/2022    CREATININE 1 51 (H) 03/09/2023    CREATININE 1 48 (H) 01/16/2023    CREATININE 1 39 (H) 11/01/2022   Patient PTH and phosphorus along with vitamin D are within acceptable range and advised to continue vitamin D at 2000 unit every day

## 2023-03-15 NOTE — ASSESSMENT & PLAN NOTE
Wt Readings from Last 3 Encounters:   03/15/23 98 9 kg (218 lb)   02/02/23 95 3 kg (210 lb)   01/19/23 94 9 kg (209 lb 3 2 oz)     On low-dose diuretic and seems to be stable

## 2023-03-15 NOTE — ASSESSMENT & PLAN NOTE
Stable and asymptomatic    Being monitored by cardiologist   Risk of contrast within if need for cardiac cath discussed with the patient

## 2023-03-15 NOTE — PROGRESS NOTES
NEPHROLOGY OFFICE CONSULT  Rocio Begum 69 y o  male MRN: [id number]    Encounter: [id number] 3/15/2023    REASON FOR VISIT: Rocio Begum is a 69 y o male who was referred by Ilan Archibald for evaluation of Chronic Kidney Disease and Consult    HPI:    Beth Ferminner came in today for evaluation management of CKD  55-year-old gentleman with history of coronary disease and history of GI tumor requiring splenectomy and on chemotherapy was found to have fluctuating kidney function so was advised to see me    Patient also has a coronary disease requiring stenting and being monitored by cardiologist    He denies any acute complaint except some joint pain    He had a fall couple of times so he does have some back pain    Denies taking nausea painkiller    Denies any urinary complaint    No history of hypertension or diabetes    Patient does get CT scan with contrast as part of the follow-up for the cancer      REVIEW OF SYSTEMS:    Review of Systems   Constitutional: Negative for activity change and fatigue  HENT: Negative for congestion and ear discharge  Eyes: Negative for photophobia and pain  Respiratory: Negative for apnea and choking  Cardiovascular: Negative for chest pain and palpitations  Gastrointestinal: Negative for abdominal distention and blood in stool  Endocrine: Negative for heat intolerance and polyphagia  Genitourinary: Negative for flank pain and urgency  Musculoskeletal: Negative for neck pain and neck stiffness  Skin: Negative for color change and wound  Allergic/Immunologic: Negative for food allergies and immunocompromised state  Neurological: Negative for seizures and facial asymmetry  Hematological: Negative for adenopathy  Does not bruise/bleed easily  Psychiatric/Behavioral: Negative for self-injury and suicidal ideas           PAST MEDICAL HISTORY:  Past Medical History:   Diagnosis Date   • Cancer ([facility] [address] ) 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   • Chronic kidney disease • Hyperlipidemia    • Hypertension    • Myocardial infarction [facility])        PAST SURGICAL HISTORY:  Past Surgical History:   Procedure Laterality Date   • ABDOMINAL SURGERY      had a gist   tumour  removed   • CORONARY ANGIOPLASTY WITH STENT PLACEMENT  2019   • EGD     • AL TYMPANOSTOMY GENERAL ANESTHESIA Right 1/19/2023    Procedure: MYRINGOTOMY W/ INSERTION VENTILATION TUBE EAR;  Surgeon: Scott Wei MD;  Location: [address] [place] [address];  Service: ENT       SOCIAL HISTORY:  Social History     Substance and Sexual Activity   Alcohol Use Never     Social History     Substance and Sexual Activity   Drug Use Never     Social History     Tobacco Use   Smoking Status Former   • Types: Cigarettes   • Quit date: 5/12/2019   • Years since quitting: 3 8   Smokeless Tobacco Never       FAMILY HISTORY:  Family History   Problem Relation Age of Onset   • Alzheimer's disease Father        MEDICATIONS:    Current Outpatient Medications:   •  aspirin (ECOTRIN LOW STRENGTH) 81 mg EC tablet, Take 1 tablet (81 mg total) by mouth daily, Disp: 30 tablet, Rfl: 0  •  atorvastatin (LIPITOR) 40 mg tablet, Take 1 tablet (40 mg total) by mouth daily (Patient taking differently: Take 20 mg by mouth daily at bedtime), Disp: 90 tablet, Rfl: 3  •  cetirizine (ZyrTEC) 10 mg tablet, Take 10 mg by mouth daily, Disp: , Rfl:   •  fluticasone (FLONASE) 50 mcg/act nasal spray, 1 spray into each nostril daily (Patient taking differently: 1 spray into each nostril if needed), Disp: 18 2 mL, Rfl: 1  •  furosemide (LASIX) 20 mg tablet, Take 1 tablet (20 mg total) by mouth daily, Disp: 90 tablet, Rfl: 3  •  imatinib (GLEEVEC) 400 mg tablet, Take 400 mg by mouth daily, Disp: , Rfl:   •  lisinopril (ZESTRIL) 2 5 mg tablet, Take 1 tablet (2 5 mg total) by mouth daily, Disp: 90 tablet, Rfl: 1  •  metoprolol tartrate (LOPRESSOR) 25 mg tablet, Take 1 tablet (25 mg total) by mouth every 12 (twelve) hours, Disp: 180 tablet, Rfl: 3  •  potassium chloride (K-DUR,KLOR-CON) 20 mEq tablet, Take 1 tablet (20 mEq total) by mouth daily, Disp: 90 tablet, Rfl: 3  •  ergocalciferol (VITAMIN D2) 50,000 units, Take 1 capsule (50,000 Units total) by mouth once a week for 8 doses (Patient not taking: Reported on 3/15/2023), Disp: 8 capsule, Rfl: 0    PHYSICAL EXAM:  Vitals:    03/15/23 0904   BP: 100/70   BP Location: Right arm   Patient Position: Sitting   Pulse: 78   Resp: 16   Temp: 97 9 °F (36 6 °C)   TempSrc: Temporal   SpO2: 98%   Weight: 98 9 kg (218 lb)   Height: 5' 10" (1 778 m)     Body mass index is 31 28 kg/m²  Physical Exam  Constitutional:       General: He is not in acute distress  Appearance: He is well-developed  He is obese  HENT:      Head: Normocephalic  Eyes:      General: No scleral icterus  Conjunctiva/sclera: Conjunctivae normal       Pupils: Pupils are equal, round, and reactive to light  Neck:      Vascular: No JVD  Cardiovascular:      Rate and Rhythm: Normal rate and regular rhythm  Heart sounds: Normal heart sounds  Pulmonary:      Effort: Pulmonary effort is normal       Breath sounds: Normal breath sounds  No wheezing  Abdominal:      Palpations: Abdomen is soft  Tenderness: There is no abdominal tenderness  Musculoskeletal:         General: Normal range of motion  Cervical back: Normal range of motion and neck supple  Skin:     General: Skin is warm  Findings: No rash  Neurological:      General: No focal deficit present  Mental Status: He is alert and oriented to person, place, and time     Psychiatric:         Behavior: Behavior normal          LAB RESULTS:  Results for orders placed or performed in visit on 83/88/67   Basic metabolic panel   Result Value Ref Range    Sodium 142 135 - 147 mmol/L    Potassium 3 8 3 5 - 5 3 mmol/L    Chloride 109 (H) 96 - 108 mmol/L    CO2 25 21 - 32 mmol/L    ANION GAP 8 4 - 13 mmol/L    BUN 23 5 - 25 mg/dL    Creatinine 1 51 (H) 0 60 - 1 30 mg/dL    Glucose, Fasting 122 (H) 65 - 99 mg/dL    Calcium 9 0 8 3 - 10 1 mg/dL    eGFR 46 ml/min/1 73sq m   Phosphorus   Result Value Ref Range    Phosphorus 2 0 (L) 2 3 - 4 1 mg/dL   CBC and differential   Result Value Ref Range    WBC 8 15 4 31 - 10 16 Thousand/uL    RBC 4 20 3 88 - 5 62 Million/uL    Hemoglobin 14 6 12 0 - 17 0 g/dL    Hematocrit 44 0 36 5 - 49 3 %     (H) 82 - 98 fL    MCH 34 8 (H) 26 8 - 34 3 pg    MCHC 33 2 31 4 - 37 4 g/dL    RDW 14 3 11 6 - 15 1 %    MPV 9 6 8 9 - 12 7 fL    Platelets 636 (H) 364 - 390 Thousands/uL    nRBC 0 /100 WBCs    Neutrophils Relative 51 43 - 75 %    Immat GRANS % 0 0 - 2 %    Lymphocytes Relative 29 14 - 44 %    Monocytes Relative 14 (H) 4 - 12 %    Eosinophils Relative 5 0 - 6 %    Basophils Relative 1 0 - 1 %    Neutrophils Absolute 4 21 1 85 - 7 62 Thousands/µL    Immature Grans Absolute 0 02 0 00 - 0 20 Thousand/uL    Lymphocytes Absolute 2 33 0 60 - 4 47 Thousands/µL    Monocytes Absolute 1 14 0 17 - 1 22 Thousand/µL    Eosinophils Absolute 0 38 0 00 - 0 61 Thousand/µL    Basophils Absolute 0 07 0 00 - 0 10 Thousands/µL   PTH, intact   Result Value Ref Range    PTH 83 2 (H) 18 4 - 80 1 pg/mL   Vitamin D 25 hydroxy   Result Value Ref Range    Vit D, 25-Hydroxy 48 8 30 0 - 100 0 ng/mL       ASSESSMENT and PLAN:    Stage 3a chronic kidney disease (HCC)  Lab Results   Component Value Date    EGFR 46 03/09/2023    EGFR 47 01/16/2023    EGFR 51 11/01/2022    CREATININE 1 51 (H) 03/09/2023    CREATININE 1 48 (H) 01/16/2023    CREATININE 1 39 (H) 11/01/2022   Patient does seems to have CKD based on past data  Etiology unclear  It may multifactorial with possible coronary disease and need for IV contrast because of cancer    Pathophysiology of kidney disease discussed at length with the patient  Importance of hydration and avoiding nephrotoxic medicine also discussed with him    Asymptomatic and progressive nature of kidney disease also discussed with him    At this point I will repeat blood and urine test   Patient had CT scan of abdomen done which did not show any abnormality in kidney except couple of cyst which I discussed with him    Patient is also on low-dose of lisinopril which I will continue    Chronic kidney disease-mineral and bone disorder  Lab Results   Component Value Date    EGFR 46 03/09/2023    EGFR 47 01/16/2023    EGFR 51 11/01/2022    CREATININE 1 51 (H) 03/09/2023    CREATININE 1 48 (H) 01/16/2023    CREATININE 1 39 (H) 11/01/2022   Patient PTH and phosphorus along with vitamin D are within acceptable range and advised to continue vitamin D at 2000 unit every day    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, malignant (HCC)  Overall seems to be stable  S/p splenectomy and being monitored by oncologist on chemotherapy    CAD (coronary artery disease)  Stable and asymptomatic  Being monitored by cardiologist   Risk of contrast within if need for cardiac cath discussed with the patient    Acute on chronic diastolic (congestive) heart failure ([facility] [address] )  Wt Readings from Last 3 Encounters:   03/15/23 98 9 kg (218 lb)   02/02/23 95 3 kg (210 lb)   01/19/23 94 9 kg (209 lb 3 2 oz)     On low-dose diuretic and seems to be stable      Everything discussed at length with the patient  I will continue to monitor him  We will get blood and urine test before next visit  Will advised to avoid any intravenous contrast if possible  For cancer monitoring we will recommend MRI if possible  Thank you very much for the consultation I will monitor the patient with you        Portions of the record may have been created with voice recognition software  Occasional wrong word or "sound a like" substitutions may have occurred due to the inherent limitations of voice recognition software  Read the chart carefully and recognize, using context, where substitutions have occurred  If you have any questions, please contact the dictating provider

## 2023-05-08 DIAGNOSIS — N18.31 STAGE 3A CHRONIC KIDNEY DISEASE (HCC): ICD-10-CM

## 2023-05-08 DIAGNOSIS — I21.11 ST ELEVATION MYOCARDIAL INFARCTION INVOLVING RIGHT CORONARY ARTERY (HCC): ICD-10-CM

## 2023-05-08 RX ORDER — LISINOPRIL 2.5 MG/1
2.5 TABLET ORAL DAILY
Qty: 90 TABLET | Refills: 1 | Status: SHIPPED | OUTPATIENT
Start: 2023-05-08 | End: 2023-05-09 | Stop reason: SDUPTHER

## 2023-05-08 RX ORDER — ATORVASTATIN CALCIUM 40 MG/1
40 TABLET, FILM COATED ORAL DAILY
Qty: 90 TABLET | Refills: 3 | Status: SHIPPED | OUTPATIENT
Start: 2023-05-08

## 2023-05-08 NOTE — TELEPHONE ENCOUNTER
Name of Caller:Ladarius    Call back [address]    Medication(s):Atorvastatin, Lisinopril    Are we prescribing provider?:    30 or 90 day supply:90    Pharmacy name/number:[facility] [address] or Next appt?:Kacy

## 2023-05-09 DIAGNOSIS — N18.31 STAGE 3A CHRONIC KIDNEY DISEASE (HCC): ICD-10-CM

## 2023-05-09 RX ORDER — LISINOPRIL 2.5 MG/1
2.5 TABLET ORAL DAILY
Qty: 90 TABLET | Refills: 1 | Status: SHIPPED | OUTPATIENT
Start: 2023-05-09

## 2023-06-01 ENCOUNTER — OFFICE VISIT (OUTPATIENT)
Dept: OTOLARYNGOLOGY | Facility: CLINIC | Age: 68
End: 2023-06-01

## 2023-06-01 VITALS — WEIGHT: 206 LBS | HEIGHT: 70 IN | TEMPERATURE: 97.2 F | BODY MASS INDEX: 29.49 KG/M2

## 2023-06-01 DIAGNOSIS — Z96.22 HISTORY OF TYMPANOSTOMY TUBE PLACEMENT: Primary | ICD-10-CM

## 2023-06-01 NOTE — PROGRESS NOTES
"Assessment/Plan:  Right PE tube still in place and patent  F/u in 3 months  Diagnosis ICD-10-CM Associated Orders   1  History of tympanostomy tube placement  Z96 22              Subjective:      Patient ID: Minoo Burns is a 69 y o  male  S/p right M&T on 1/19/23  No c/o  Hearing feels normal       The following portions of the patient's history were reviewed and updated as appropriate: allergies, current medications, past family history, past medical history, past social history, past surgical history and problem list     Review of Systems      Objective:      Temp (!) 97 2 °F (36 2 °C) (Temporal)   Ht 5' 10\" (1 778 m)   Wt 93 4 kg (206 lb)   BMI 29 56 kg/m²          Physical Exam  HENT:      Right Ear: Tympanic membrane, ear canal and external ear normal  A PE tube is present        Left Ear: Tympanic membrane, ear canal and external ear normal          "

## 2023-06-02 DIAGNOSIS — I21.11 ST ELEVATION MYOCARDIAL INFARCTION INVOLVING RIGHT CORONARY ARTERY (HCC): ICD-10-CM

## 2023-06-02 NOTE — TELEPHONE ENCOUNTER
Name of Caller:Ladarius    Call back [address]    Medication(s):Metoprolol    Are we prescribing provider?:    30 or 90 day supply:    [facility]number[id number] or [facility] appt? June 9th

## 2023-06-09 ENCOUNTER — OFFICE VISIT (OUTPATIENT)
Dept: CARDIOLOGY CLINIC | Facility: CLINIC | Age: 68
End: 2023-06-09
Payer: COMMERCIAL

## 2023-06-09 VITALS
BODY MASS INDEX: 29.78 KG/M2 | WEIGHT: 208 LBS | DIASTOLIC BLOOD PRESSURE: 76 MMHG | SYSTOLIC BLOOD PRESSURE: 130 MMHG | OXYGEN SATURATION: 97 % | HEART RATE: 60 BPM | RESPIRATION RATE: 16 BRPM | HEIGHT: 70 IN

## 2023-06-09 DIAGNOSIS — I50.20 SYSTOLIC CONGESTIVE HEART FAILURE, UNSPECIFIED HF CHRONICITY (HCC): Primary | ICD-10-CM

## 2023-06-09 DIAGNOSIS — I25.10 CORONARY ARTERY DISEASE INVOLVING NATIVE HEART WITHOUT ANGINA PECTORIS, UNSPECIFIED VESSEL OR LESION TYPE: ICD-10-CM

## 2023-06-09 PROCEDURE — 99213 OFFICE O/P EST LOW 20 MIN: CPT | Performed by: INTERNAL MEDICINE

## 2023-06-09 NOTE — PROGRESS NOTES
Cardiology Follow Up    Sonia Disla  1955  [id number]  [facility]  [address] [place] [address] [phone]    1  Systolic congestive heart failure, unspecified HF chronicity ([address] )  -     Echo complete w/ contrast if indicated; Future; Expected date: 06/09/2023    2  Coronary artery disease involving native heart without angina pectoris, unspecified vessel or lesion type          Interval History: 57-year-old man who had a STEMI which was treated with a drug-eluting stent in the right coronary artery  Subsequent stress test was negative  He had a previous ejection fraction of 60% which appeared to declined to an estimated 50%  Because of this I recommended a repeat GLS and echo since she is on [address] therapy for a GIST tumor  Unfortunately he came back before this test was done  He notes occasional shortness of breath with exertion but he feels this has not been a progressive problem      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)      Social History     Socioeconomic History   • Marital status: /Civil Union     Spouse name: Not on file   • Number of children: Not on file • Years of education: Not on file   • Highest education level: Not on file   Occupational History   • Not on file   Tobacco Use   • Smoking status: Former     Packs/day: 1 00     Years: 44 00     Total pack years: 44 00     Types: Cigarettes     Start date: 6/15/1975     Quit date: 2019     Years since quittin 0   • Smokeless tobacco: Never   Vaping Use   • Vaping Use: Never used   Substance and Sexual Activity   • Alcohol use: Never   • Drug use: Never   • Sexual activity: Not Currently   Other Topics Concern   • Not on file   Social History Narrative   • Not on file     Social Determinants of Health     Financial Resource Strain: Low Risk  (11/10/2022)    Overall Financial Resource Strain (CARDIA)    • Difficulty of Paying Living Expenses: Not very hard   Food Insecurity: Not on file   Transportation Needs: No Transportation Needs (11/10/2022)    PRAPARE - Transportation    • Lack of Transportation (Medical): No    • Lack of Transportation (Non-Medical):  No   Physical Activity: Not on file   Stress: Not on file   Social Connections: Not on file   Intimate Partner Violence: Not on file   Housing Stability: Not on file      Family History   Problem Relation Age of Onset   • Alzheimer's disease Father      Past Surgical History:   Procedure Laterality Date   • ABDOMINAL SURGERY      had a gist   tumour  removed   • CORONARY ANGIOPLASTY WITH STENT PLACEMENT     • EGD     • ND TYMPANOSTOMY GENERAL ANESTHESIA Right 2023    Procedure: MYRINGOTOMY W/ INSERTION VENTILATION TUBE EAR;  Surgeon: Kortney Huerta MD;  Location: [address] [place] [address];  Service: ENT       Current Outpatient Medications:   •  aspirin (ECOTRIN LOW STRENGTH) 81 mg EC tablet, Take 1 tablet (81 mg total) by mouth daily, Disp: 30 tablet, Rfl: 0  •  atorvastatin (LIPITOR) 40 mg tablet, Take 1 tablet (40 mg total) by mouth daily, Disp: 90 tablet, Rfl: 3  •  cetirizine (ZyrTEC) 10 mg tablet, Take 10 mg by mouth daily, Disp: , Rfl:   • fluticasone (FLONASE) 50 mcg/act nasal spray, 1 spray into each nostril daily (Patient taking differently: 1 spray into each nostril if needed), Disp: 18 2 mL, Rfl: 1  •  furosemide (LASIX) 20 mg tablet, Take 1 tablet (20 mg total) by mouth daily, Disp: 90 tablet, Rfl: 3  •  imatinib (GLEEVEC) 400 mg tablet, Take 400 mg by mouth daily, Disp: , Rfl:   •  lisinopril (ZESTRIL) 2 5 mg tablet, Take 1 tablet (2 5 mg total) by mouth daily, Disp: 90 tablet, Rfl: 1  •  metoprolol tartrate (LOPRESSOR) 25 mg tablet, Take 1 tablet (25 mg total) by mouth every 12 (twelve) hours, Disp: 180 tablet, Rfl: 3  •  potassium chloride (K-DUR,KLOR-CON) 20 mEq tablet, Take 1 tablet (20 mEq total) by mouth daily, Disp: 90 tablet, Rfl: 3  •  ergocalciferol (VITAMIN D2) 50,000 units, Take 1 capsule (50,000 Units total) by mouth once a week for 8 doses (Patient not taking: Reported on 3/15/2023), Disp: 8 capsule, Rfl: 0  No Known Allergies    Labs:  No visits with results within 2 Month(s) from this visit     Latest known visit with results is:   Appointment on 03/09/2023   Component Date Value   • Sodium 03/09/2023 142    • Potassium 03/09/2023 3 8    • Chloride 03/09/2023 109 (H)    • CO2 03/09/2023 25    • ANION GAP 03/09/2023 8    • BUN 03/09/2023 23    • Creatinine 03/09/2023 1 51 (H)    • Glucose, Fasting 03/09/2023 122 (H)    • Calcium 03/09/2023 9 0    • eGFR 03/09/2023 46    • Phosphorus 03/09/2023 2 0 (L)    • WBC 03/09/2023 8 15    • RBC 03/09/2023 4 20    • Hemoglobin 03/09/2023 14 6    • Hematocrit 03/09/2023 44 0    • MCV 03/09/2023 105 (H)    • MCH 03/09/2023 34 8 (H)    • MCHC 03/09/2023 33 2    • RDW 03/09/2023 14 3    • MPV 03/09/2023 9 6    • Platelets 87/63/3364 425 (H)    • nRBC 03/09/2023 0    • Neutrophils Relative 03/09/2023 51    • Immat GRANS % 03/09/2023 0    • Lymphocytes Relative 03/09/2023 29    • Monocytes Relative 03/09/2023 14 (H)    • Eosinophils Relative 03/09/2023 5    • Basophils Relative 03/09/2023 1    • Neutrophils Absolute 03/09/2023 4 21    • Immature Grans Absolute 03/09/2023 0 02    • Lymphocytes Absolute 03/09/2023 2 33    • Monocytes Absolute 03/09/2023 1 14    • Eosinophils Absolute 03/09/2023 0 38    • Basophils Absolute 03/09/2023 0 07    • PTH 03/09/2023 83 2 (H)    • Vit D, 25-Hydroxy 03/09/2023 48 8      Imaging: No results found  Review of Systems:  Review of Systems    Physical Exam:  130/76  Heart rate 60 and regular  Lungs clear  Rhythm regular  No murmurs or gallops  Discussion/Summary:    #1  Coronary artery disease without angina pectoris  2  Question of decrease in left ventricular contractility    Recommendations:    1   Echo for ejection fraction and GLS  2    Return 4 months      Vernon Strauss MD

## 2023-06-20 ENCOUNTER — HOSPITAL ENCOUNTER (OUTPATIENT)
Dept: NON INVASIVE DIAGNOSTICS | Facility: CLINIC | Age: 68
Discharge: HOME | End: 2023-06-20
Payer: COMMERCIAL

## 2023-06-20 VITALS
SYSTOLIC BLOOD PRESSURE: 130 MMHG | DIASTOLIC BLOOD PRESSURE: 76 MMHG | HEIGHT: 70 IN | WEIGHT: 208 LBS | BODY MASS INDEX: 29.78 KG/M2 | HEART RATE: 60 BPM

## 2023-06-20 DIAGNOSIS — I50.20 SYSTOLIC CONGESTIVE HEART FAILURE, UNSPECIFIED HF CHRONICITY (HCC): ICD-10-CM

## 2023-06-20 LAB
APICAL FOUR CHAMBER EJECTION FRACTION: 57 %
E WAVE DECELERATION TIME: 248 MS
LEFT VENTRICLE DIASTOLIC VOLUME (MOD BIPLANE): 98 ML
LEFT VENTRICLE SYSTOLIC VOLUME (MOD BIPLANE): 43 ML
LV EF: 56 %
MV E'TISSUE VEL-SEP: 7 CM/S
MV PEAK A VEL: 0.7 M/S
MV PEAK E VEL: 79 CM/S
MV STENOSIS PRESSURE HALF TIME: 72 MS
MV VALVE AREA P 1/2 METHOD: 3.06 CM2

## 2023-06-20 PROCEDURE — 93308 TTE F-UP OR LMTD: CPT | Performed by: INTERNAL MEDICINE

## 2023-06-20 PROCEDURE — 93325 DOPPLER ECHO COLOR FLOW MAPG: CPT | Performed by: INTERNAL MEDICINE

## 2023-06-20 PROCEDURE — 93321 DOPPLER ECHO F-UP/LMTD STD: CPT | Performed by: INTERNAL MEDICINE

## 2023-06-20 PROCEDURE — 93325 DOPPLER ECHO COLOR FLOW MAPG: CPT

## 2023-06-20 PROCEDURE — 93308 TTE F-UP OR LMTD: CPT

## 2023-06-20 PROCEDURE — 93356 MYOCRD STRAIN IMG SPCKL TRCK: CPT | Performed by: INTERNAL MEDICINE

## 2023-06-20 PROCEDURE — 93321 DOPPLER ECHO F-UP/LMTD STD: CPT

## 2023-06-27 DIAGNOSIS — I50.33 ACUTE ON CHRONIC DIASTOLIC CONGESTIVE HEART FAILURE (HCC): ICD-10-CM

## 2023-06-27 RX ORDER — FUROSEMIDE 20 MG/1
20 TABLET ORAL DAILY
Qty: 90 TABLET | Refills: 3 | Status: SHIPPED | OUTPATIENT
Start: 2023-06-27

## 2023-06-27 NOTE — TELEPHONE ENCOUNTER
Name of Caller:Ladarius odalys khurram      Call back Number:  [phone]        Medication(s):Furosemide     30 or 90 day supply:    [facility]number[facility] [address] [place] [address] or [facility] appt?:

## 2023-07-06 ENCOUNTER — TELEPHONE (OUTPATIENT)
Dept: NEPHROLOGY | Facility: CLINIC | Age: 68
End: 2023-07-06

## 2023-07-06 DIAGNOSIS — N18.31 STAGE 3A CHRONIC KIDNEY DISEASE (HCC): Primary | ICD-10-CM

## 2023-07-10 ENCOUNTER — APPOINTMENT (OUTPATIENT)
Age: 68
End: 2023-07-10
Payer: COMMERCIAL

## 2023-07-10 DIAGNOSIS — N18.31 STAGE 3A CHRONIC KIDNEY DISEASE (HCC): ICD-10-CM

## 2023-07-10 LAB
25(OH)D3 SERPL-MCNC: 33.7 NG/ML (ref 30–100)
ANION GAP SERPL CALCULATED.3IONS-SCNC: 7 MMOL/L
BACTERIA UR QL AUTO: ABNORMAL /HPF
BASOPHILS # BLD AUTO: 0.08 THOUSANDS/ÂΜL (ref 0–0.1)
BASOPHILS NFR BLD AUTO: 1 % (ref 0–1)
BILIRUB UR QL STRIP: NEGATIVE
BUN SERPL-MCNC: 19 MG/DL (ref 5–25)
CALCIUM SERPL-MCNC: 9.2 MG/DL (ref 8.3–10.1)
CHLORIDE SERPL-SCNC: 112 MMOL/L (ref 96–108)
CLARITY UR: ABNORMAL
CO2 SERPL-SCNC: 26 MMOL/L (ref 21–32)
COLOR UR: ABNORMAL
CREAT SERPL-MCNC: 1.79 MG/DL (ref 0.6–1.3)
CREAT UR-MCNC: 245 MG/DL
EOSINOPHIL # BLD AUTO: 0.49 THOUSAND/ÂΜL (ref 0–0.61)
EOSINOPHIL NFR BLD AUTO: 5 % (ref 0–6)
ERYTHROCYTE [DISTWIDTH] IN BLOOD BY AUTOMATED COUNT: 14.5 % (ref 11.6–15.1)
GFR SERPL CREATININE-BSD FRML MDRD: 37 ML/MIN/1.73SQ M
GLUCOSE P FAST SERPL-MCNC: 117 MG/DL (ref 65–99)
GLUCOSE UR STRIP-MCNC: NEGATIVE MG/DL
HCT VFR BLD AUTO: 45.4 % (ref 36.5–49.3)
HGB BLD-MCNC: 14.8 G/DL (ref 12–17)
HGB UR QL STRIP.AUTO: NEGATIVE
HYALINE CASTS #/AREA URNS LPF: ABNORMAL /LPF
IMM GRANULOCYTES # BLD AUTO: 0.02 THOUSAND/UL (ref 0–0.2)
IMM GRANULOCYTES NFR BLD AUTO: 0 % (ref 0–2)
KETONES UR STRIP-MCNC: NEGATIVE MG/DL
LEUKOCYTE ESTERASE UR QL STRIP: NEGATIVE
LYMPHOCYTES # BLD AUTO: 2.36 THOUSANDS/ÂΜL (ref 0.6–4.47)
LYMPHOCYTES NFR BLD AUTO: 25 % (ref 14–44)
MCH RBC QN AUTO: 34.9 PG (ref 26.8–34.3)
MCHC RBC AUTO-ENTMCNC: 32.6 G/DL (ref 31.4–37.4)
MCV RBC AUTO: 107 FL (ref 82–98)
MONOCYTES # BLD AUTO: 1.23 THOUSAND/ÂΜL (ref 0.17–1.22)
MONOCYTES NFR BLD AUTO: 13 % (ref 4–12)
MUCOUS THREADS UR QL AUTO: ABNORMAL
NEUTROPHILS # BLD AUTO: 5.34 THOUSANDS/ÂΜL (ref 1.85–7.62)
NEUTS SEG NFR BLD AUTO: 56 % (ref 43–75)
NITRITE UR QL STRIP: NEGATIVE
NON-SQ EPI CELLS URNS QL MICRO: ABNORMAL /HPF
NRBC BLD AUTO-RTO: 0 /100 WBCS
PH UR STRIP.AUTO: 6 [PH]
PHOSPHATE SERPL-MCNC: 1.7 MG/DL (ref 2.3–4.1)
PLATELET # BLD AUTO: 399 THOUSANDS/UL (ref 149–390)
PMV BLD AUTO: 10.1 FL (ref 8.9–12.7)
POTASSIUM SERPL-SCNC: 4.8 MMOL/L (ref 3.5–5.3)
PROT UR STRIP-MCNC: ABNORMAL MG/DL
PROT UR-MCNC: 39 MG/DL
PROT/CREAT UR: 0.16 MG/G{CREAT} (ref 0–0.1)
PTH-INTACT SERPL-MCNC: 75 PG/ML (ref 12–88)
RBC # BLD AUTO: 4.24 MILLION/UL (ref 3.88–5.62)
RBC #/AREA URNS AUTO: ABNORMAL /HPF
SODIUM SERPL-SCNC: 145 MMOL/L (ref 135–147)
SP GR UR STRIP.AUTO: 1.02 (ref 1–1.03)
UROBILINOGEN UR STRIP-ACNC: <2 MG/DL
WBC # BLD AUTO: 9.52 THOUSAND/UL (ref 4.31–10.16)
WBC #/AREA URNS AUTO: ABNORMAL /HPF

## 2023-07-10 PROCEDURE — 85025 COMPLETE CBC W/AUTO DIFF WBC: CPT

## 2023-07-10 PROCEDURE — 84156 ASSAY OF PROTEIN URINE: CPT

## 2023-07-10 PROCEDURE — 36415 COLL VENOUS BLD VENIPUNCTURE: CPT

## 2023-07-10 PROCEDURE — 83970 ASSAY OF PARATHORMONE: CPT

## 2023-07-10 PROCEDURE — 80048 BASIC METABOLIC PNL TOTAL CA: CPT

## 2023-07-10 PROCEDURE — 82570 ASSAY OF URINE CREATININE: CPT

## 2023-07-10 PROCEDURE — 84100 ASSAY OF PHOSPHORUS: CPT

## 2023-07-10 PROCEDURE — 82306 VITAMIN D 25 HYDROXY: CPT

## 2023-07-10 PROCEDURE — 81001 URINALYSIS AUTO W/SCOPE: CPT

## 2023-07-14 ENCOUNTER — OFFICE VISIT (OUTPATIENT)
Dept: NEPHROLOGY | Facility: CLINIC | Age: 68
End: 2023-07-14
Payer: COMMERCIAL

## 2023-07-14 VITALS
DIASTOLIC BLOOD PRESSURE: 70 MMHG | HEART RATE: 70 BPM | OXYGEN SATURATION: 94 % | RESPIRATION RATE: 18 BRPM | WEIGHT: 209.8 LBS | SYSTOLIC BLOOD PRESSURE: 100 MMHG | HEIGHT: 70 IN | TEMPERATURE: 98.3 F | BODY MASS INDEX: 30.03 KG/M2

## 2023-07-14 DIAGNOSIS — I25.10 CORONARY ARTERY DISEASE INVOLVING NATIVE HEART, UNSPECIFIED VESSEL OR LESION TYPE, UNSPECIFIED WHETHER ANGINA PRESENT: ICD-10-CM

## 2023-07-14 DIAGNOSIS — I50.20 SYSTOLIC CONGESTIVE HEART FAILURE, UNSPECIFIED HF CHRONICITY (HCC): ICD-10-CM

## 2023-07-14 DIAGNOSIS — M89.9 CHRONIC KIDNEY DISEASE-MINERAL AND BONE DISORDER: ICD-10-CM

## 2023-07-14 DIAGNOSIS — N18.9 CHRONIC KIDNEY DISEASE-MINERAL AND BONE DISORDER: ICD-10-CM

## 2023-07-14 DIAGNOSIS — C49.A3 GIST (GASTROINTESTINAL STROMAL TUMOR) OF SMALL BOWEL, MALIGNANT (HCC): ICD-10-CM

## 2023-07-14 DIAGNOSIS — E83.9 CHRONIC KIDNEY DISEASE-MINERAL AND BONE DISORDER: ICD-10-CM

## 2023-07-14 DIAGNOSIS — N18.31 STAGE 3A CHRONIC KIDNEY DISEASE (HCC): Primary | ICD-10-CM

## 2023-07-14 PROCEDURE — 99214 OFFICE O/P EST MOD 30 MIN: CPT | Performed by: INTERNAL MEDICINE

## 2023-07-14 NOTE — ASSESSMENT & PLAN NOTE
Wt Readings from Last 3 Encounters:   07/14/23 95.2 kg (209 lb 12.8 oz)   06/20/23 94.3 kg (208 lb)   06/09/23 94.3 kg (208 lb)     At present seems to be asymptomatic.   We will continue with low-dose diuretic

## 2023-07-14 NOTE — ASSESSMENT & PLAN NOTE
Lab Results   Component Value Date    EGFR 37 07/10/2023    EGFR 46 03/09/2023    EGFR 47 01/16/2023    CREATININE 1.79 (H) 07/10/2023    CREATININE 1.51 (H) 03/09/2023    CREATININE 1.48 (H) 01/16/2023   PTH and phosphorus along with vitamin D are within acceptable range and will continue to monitor

## 2023-07-14 NOTE — PROGRESS NOTES
NEPHROLOGY OFFICE FOLLOW UP  Orquidea Jacobo 77 y.o. male MRN: [id number]    Encounter: [id number] 7/14/2023    REASON FOR VISIT: Orquidea Jacobo is a 77 y.o. male who is here on 7/14/2023 for Chronic Kidney Disease and Follow-up  . HPI:    [address] came in today for follow-up of CKD. 75-year-old gentleman who is doing well overall    Denies any acute complaint    No chest pain no palpitation or shortness of breath    No nausea no vomiting    Patient does have intestinal tumor for which he is on chemotherapy    No other acute complaint      REVIEW OF SYSTEMS:    Review of Systems   Constitutional: Negative for activity change and fatigue. HENT: Negative for congestion and ear discharge. Eyes: Negative for photophobia and pain. Respiratory: Negative for apnea and choking. Cardiovascular: Negative for chest pain and palpitations. Gastrointestinal: Negative for abdominal distention and blood in stool. Endocrine: Negative for heat intolerance and polyphagia. Genitourinary: Negative for flank pain and urgency. Musculoskeletal: Negative for neck pain and neck stiffness. Skin: Negative for color change and wound. Allergic/Immunologic: Negative for food allergies and immunocompromised state. Neurological: Negative for seizures and facial asymmetry. Hematological: Negative for adenopathy. Does not bruise/bleed easily. Psychiatric/Behavioral: Negative for self-injury and suicidal ideas.          PAST MEDICAL HISTORY:  Past Medical History:   Diagnosis Date   • Cancer ([address]) 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   • Chronic kidney disease    • Hyperlipidemia    • Hypertension    • Myocardial infarction [facility])        PAST SURGICAL HISTORY:  Past Surgical History:   Procedure Laterality Date   • ABDOMINAL SURGERY      had a gist   tumour  removed   • CORONARY ANGIOPLASTY WITH STENT PLACEMENT  2019   • EGD     • SC TYMPANOSTOMY GENERAL ANESTHESIA Right 1/19/2023    Procedure: MYRINGOTOMY W/ INSERTION VENTILATION TUBE EAR;  Surgeon: Santiago Cox MD;  Location: [facility]  Service: ENT       SOCIAL HISTORY:  Social History     Substance and Sexual Activity   Alcohol Use Never     Social History     Substance and Sexual Activity   Drug Use Never     Social History     Tobacco Use   Smoking Status Former   • Packs/day: 1.00   • Years: 44.00   • Total pack years: 44.00   • Types: Cigarettes   • Start date: 6/15/1975   • Quit date: 2019   • Years since quittin.1   • Passive exposure: Current   Smokeless Tobacco Never       FAMILY HISTORY:  Family History   Problem Relation Age of Onset   • Alzheimer's disease Father        MEDICATIONS:    Current Outpatient Medications:   •  aspirin (ECOTRIN LOW STRENGTH) 81 mg EC tablet, Take 1 tablet (81 mg total) by mouth daily, Disp: 30 tablet, Rfl: 0  •  atorvastatin (LIPITOR) 40 mg tablet, Take 1 tablet (40 mg total) by mouth daily, Disp: 90 tablet, Rfl: 3  •  cetirizine (ZyrTEC) 10 mg tablet, Take 10 mg by mouth daily, Disp: , Rfl:   •  ergocalciferol (VITAMIN D2) 50,000 units, Take 1 capsule (50,000 Units total) by mouth once a week for 8 doses, Disp: 8 capsule, Rfl: 0  •  fluticasone (FLONASE) 50 mcg/act nasal spray, 1 spray into each nostril daily (Patient taking differently: 1 spray into each nostril if needed), Disp: 18.2 mL, Rfl: 1  •  furosemide (LASIX) 20 mg tablet, Take 1 tablet (20 mg total) by mouth daily, Disp: 90 tablet, Rfl: 3  •  imatinib (GLEEVEC) 400 mg tablet, Take 400 mg by mouth daily, Disp: , Rfl:   •  metoprolol tartrate (LOPRESSOR) 25 mg tablet, Take 1 tablet (25 mg total) by mouth every 12 (twelve) hours, Disp: 180 tablet, Rfl: 3  •  potassium chloride (K-DUR,KLOR-CON) 20 mEq tablet, Take 1 tablet (20 mEq total) by mouth daily, Disp: 90 tablet, Rfl: 3  •  lisinopril (ZESTRIL) 2.5 mg tablet, Take 1 tablet (2.5 mg total) by mouth daily (Patient not taking: Reported on 2023), Disp: 90 tablet, Rfl: 1    PHYSICAL EXAM:  Vitals:    23 1030 BP: 100/70   BP Location: Right arm   Patient Position: Sitting   Pulse: 70   Resp: 18   Temp: 98.3 °F (36.8 °C)   TempSrc: Temporal   SpO2: 94%   Weight: 95.2 kg (209 lb 12.8 oz)   Height: 5' 10" (1.778 m)     Body mass index is 30.1 kg/m². Physical Exam  Constitutional:       General: He is not in acute distress. Appearance: He is well-developed. HENT:      Head: Normocephalic. Mouth/Throat:      Mouth: Mucous membranes are moist.   Eyes:      General: No scleral icterus. Conjunctiva/sclera: Conjunctivae normal.   Neck:      Vascular: No JVD. Cardiovascular:      Rate and Rhythm: Normal rate. Heart sounds: Normal heart sounds. Pulmonary:      Effort: Pulmonary effort is normal.      Breath sounds: No wheezing. Abdominal:      Palpations: Abdomen is soft. Tenderness: There is no abdominal tenderness. Musculoskeletal:         General: Normal range of motion. Cervical back: Neck supple. Skin:     General: Skin is warm. Findings: No rash. Neurological:      Mental Status: He is alert and oriented to person, place, and time.    Psychiatric:         Behavior: Behavior normal.         LAB RESULTS:  Results for orders placed or performed in visit on 15/25/45   Basic metabolic panel   Result Value Ref Range    Sodium 145 135 - 147 mmol/L    Potassium 4.8 3.5 - 5.3 mmol/L    Chloride 112 (H) 96 - 108 mmol/L    CO2 26 21 - 32 mmol/L    ANION GAP 7 mmol/L    BUN 19 5 - 25 mg/dL    Creatinine 1.79 (H) 0.60 - 1.30 mg/dL    Glucose, Fasting 117 (H) 65 - 99 mg/dL    Calcium 9.2 8.3 - 10.1 mg/dL    eGFR 37 ml/min/1.73sq m   CBC and differential   Result Value Ref Range    WBC 9.52 4.31 - 10.16 Thousand/uL    RBC 4.24 3.88 - 5.62 Million/uL    Hemoglobin 14.8 12.0 - 17.0 g/dL    Hematocrit 45.4 36.5 - 49.3 %     (H) 82 - 98 fL    MCH 34.9 (H) 26.8 - 34.3 pg    MCHC 32.6 31.4 - 37.4 g/dL    RDW 14.5 11.6 - 15.1 %    MPV 10.1 8.9 - 12.7 fL    Platelets 255 (H) 251 - 390 Thousands/uL    nRBC 0 /100 WBCs    Neutrophils Relative 56 43 - 75 %    Immat GRANS % 0 0 - 2 %    Lymphocytes Relative 25 14 - 44 %    Monocytes Relative 13 (H) 4 - 12 %    Eosinophils Relative 5 0 - 6 %    Basophils Relative 1 0 - 1 %    Neutrophils Absolute 5.34 1.85 - 7.62 Thousands/µL    Immature Grans Absolute 0.02 0.00 - 0.20 Thousand/uL    Lymphocytes Absolute 2.36 0.60 - 4.47 Thousands/µL    Monocytes Absolute 1.23 (H) 0.17 - 1.22 Thousand/µL    Eosinophils Absolute 0.49 0.00 - 0.61 Thousand/µL    Basophils Absolute 0.08 0.00 - 0.10 Thousands/µL   Phosphorus   Result Value Ref Range    Phosphorus 1.7 (L) 2.3 - 4.1 mg/dL   PTH, intact   Result Value Ref Range    PTH 75.0 12.0 - 88.0 pg/mL   Vitamin D 25 hydroxy   Result Value Ref Range    Vit D, 25-Hydroxy 33.7 30.0 - 100.0 ng/mL       ASSESSMENT and PLAN:      Stage 3a chronic kidney disease (HCC)  Lab Results   Component Value Date    EGFR 37 07/10/2023    EGFR 46 03/09/2023    EGFR 47 01/16/2023    CREATININE 1.79 (H) 07/10/2023    CREATININE 1.51 (H) 03/09/2023    CREATININE 1.48 (H) 01/16/2023   Renal function is fluctuation. It got worse since last time. At present will advise hydration and avoiding nephrotoxic medication and monitor closely    We will stop lisinopril as blood pressure is running low and advise hydration as part of the management    Chronic kidney disease-mineral and bone disorder  Lab Results   Component Value Date    EGFR 37 07/10/2023    EGFR 46 03/09/2023    EGFR 47 01/16/2023    CREATININE 1.79 (H) 07/10/2023    CREATININE 1.51 (H) 03/09/2023    CREATININE 1.48 (H) 01/16/2023   PTH and phosphorus along with vitamin D are within acceptable range and will continue to monitor    Systolic congestive heart failure (HCC)  Wt Readings from Last 3 Encounters:   07/14/23 95.2 kg (209 lb 12.8 oz)   06/20/23 94.3 kg (208 lb)   06/09/23 94.3 kg (208 lb)     At present seems to be asymptomatic.   We will continue with low-dose diuretic        CAD (coronary artery disease)  Stable at this point      Everything discussed with the patient at length. I will see him back in 6 months but he will get blood work in 3-month and 6-month. He will stay off lisinopril        Portions of the record may have been created with voice recognition software. Occasional wrong word or "sound a like" substitutions may have occurred due to the inherent limitations of voice recognition software. Read the chart carefully and recognize, using context, where substitutions have occurred. If you have any questions, please contact the dictating provider.

## 2023-07-14 NOTE — ASSESSMENT & PLAN NOTE
Lab Results   Component Value Date    EGFR 37 07/10/2023    EGFR 46 03/09/2023    EGFR 47 01/16/2023    CREATININE 1.79 (H) 07/10/2023    CREATININE 1.51 (H) 03/09/2023    CREATININE 1.48 (H) 01/16/2023   Renal function is fluctuation. It got worse since last time.   At present will advise hydration and avoiding nephrotoxic medication and monitor closely    We will stop lisinopril as blood pressure is running low and advise hydration as part of the management

## 2023-07-27 ENCOUNTER — OFFICE VISIT (OUTPATIENT)
Age: 68
End: 2023-07-27
Payer: COMMERCIAL

## 2023-07-27 ENCOUNTER — APPOINTMENT (OUTPATIENT)
Age: 68
End: 2023-07-27
Payer: COMMERCIAL

## 2023-07-27 VITALS
DIASTOLIC BLOOD PRESSURE: 70 MMHG | TEMPERATURE: 97.8 F | SYSTOLIC BLOOD PRESSURE: 120 MMHG | BODY MASS INDEX: 29.92 KG/M2 | HEIGHT: 70 IN | OXYGEN SATURATION: 95 % | WEIGHT: 209 LBS | RESPIRATION RATE: 18 BRPM

## 2023-07-27 DIAGNOSIS — M25.512 ACUTE PAIN OF LEFT SHOULDER: Primary | ICD-10-CM

## 2023-07-27 DIAGNOSIS — M25.512 ACUTE PAIN OF LEFT SHOULDER: ICD-10-CM

## 2023-07-27 PROCEDURE — 73030 X-RAY EXAM OF SHOULDER: CPT

## 2023-07-27 PROCEDURE — 99213 OFFICE O/P EST LOW 20 MIN: CPT | Performed by: PHYSICIAN ASSISTANT

## 2023-07-27 PROCEDURE — S9083 URGENT CARE CENTER GLOBAL: HCPCS | Performed by: PHYSICIAN ASSISTANT

## 2023-07-27 RX ORDER — SENNOSIDES 8.6 MG
650 CAPSULE ORAL EVERY 8 HOURS PRN
Qty: 30 TABLET | Refills: 0 | Status: SHIPPED | OUTPATIENT
Start: 2023-07-27

## 2023-07-27 NOTE — PATIENT INSTRUCTIONS
Rotator Cuff Tear Repair   AMBULATORY CARE:   What you need to know about rotator cuff tear repair:  Rotator cuff repair is surgery to fix a tear in one or more of your rotator cuff tendons. A tendon is a cord of tough tissue that connects your muscles to your bones. The rotator cuff is made up of a group of muscles and tendons that hold the shoulder joint in place. How to prepare for surgery: Your surgeon will tell you how to prepare for surgery. Tell your surgeon about all medicines you take. Include prescription and over-the-counter medicines. You may need to stop taking some medicines days or weeks before surgery. Your surgeon will give you specific instructions to follow. Arrange to have someone drive you home and stay with you to make sure you are okay. You may be given antibiotics to prevent an infection caused by bacteria. You may be given general anesthesia to keep you asleep and free from pain during surgery. You may instead be given anesthesia to make your shoulder area numb. Tell your surgeon if you have ever had an allergic reaction to antibiotics or anesthesia. Your surgeon may tell you not to eat or drink anything after midnight on the day of your surgery. He or she will tell you which medicines to take or not take on the day of your surgery. What will happen during surgery: The tear can be repaired in several ways. Your surgeon will talk to you about the different kinds of surgery. He or she will tell you the kind that is best for you. This will mainly depend on the kind of tear you have and if it is severe. For an open repair or a mini-open repair , your surgeon will make an incision in your shoulder. An open repair may be needed if the tear is large or you need a tendon transfer. You may be able to have a mini-open repair if the tear is small and your surgeon can repair it easily. For either kind of open repair, your surgeon will examine the tendons.      For arthroscopy , your surgeon will make small incisions in your shoulder. He or she will put an arthroscope (a camera) into the joint. The camera will show pictures of your joint on a monitor. Your surgeon will put tools into other small incisions in the area. He or she will use the tools to repair the rotator cuff tear. Your surgeon may need to do several things during any kind of surgery. He or she may remove damaged tissue, bone spurs, or a swollen bursa (small sac of fluid around the joint). If the rotator cuff tear is small, he or she may sew it back together. If the tear is big, he or she may attach the tendon to the bone in your shoulder. This can be done with stitches and bone anchors to hold the tendon to the bone. A graft (a piece of another muscle or tissue) may be used to attach and repair the tendon. Your surgeon may reshape your shoulder bone to help it stay in place. Stitches may be used to close the incision. The wound will then be covered with a bandage. What to expect after surgery: You should expect to feel pain after surgery. This is normal and should get better soon. You will be given pain medicine. Your healthcare provider may also put ice or a cold pack on your shoulder. This treatment may decrease pain, swelling, and muscle spasms. A sling may be placed on your arm to keep it from moving. A pillow attached to the sling holds your arm away from your body. This position decreases pressure on the surgery area. It also helps blood flow to the area to help it heal.    Risks of rotator cuff surgery: You may get an infection from this surgery or bleed more than expected. You may have nerve damage. If you had an open repair, the muscle that was detached at the beginning may not reattach correctly. Rotator cuff surgery may cause muscles in your shoulder to become weak. This can decrease your ability to use your shoulder. Your tendon may not heal, and your arm and shoulder may be weak and painful.  If a graft was used for your repair, you may have swelling and get an infection around the graft. After surgery, tissue around the joint can swell and become stiff. This can cause new pain and problems with moving your arm. The anchor used to fix your tear may press on the tendon and cause pain. Your tendon may tear after surgery. You may need to have surgery again if the new tear causes pain or movement problems. Call your local emergency number (911 in the [address]) if:   You suddenly have shortness of breath. You have sudden numbness or tingling down your arm. Seek care immediately if:   The stitches on your shoulder wound come apart. You have new shoulder pain that does not go away, even after you take pain medicine. Your surgery area is swollen, red, or has pus coming from it. Call your surgeon or orthopedist if:   You have chills, a cough, or feel weak and achy. You have a fever. You have stiffness or pain in your shoulder that is worse and makes you stop your physical therapy. You are vomiting. Your skin is itchy, swollen, or has a rash. You have questions or concerns about your condition or care. Medicines: You may need any of the following:  Antibiotics  help prevent or treat an infection caused by bacteria. Prescription pain medicine  may be given. Ask your healthcare provider how to take this medicine safely. Some prescription pain medicines contain acetaminophen. Do not take other medicines that contain acetaminophen without talking to your healthcare provider. Too much acetaminophen may cause liver damage. Prescription pain medicine may cause constipation. Ask your healthcare provider how to prevent or treat constipation. NSAIDs  help decrease swelling and pain or fever. This medicine is available with or without a doctor's order. NSAIDs can cause stomach bleeding or kidney problems in certain people.  If you take blood thinner medicine, always ask your healthcare provider if NSAIDs are safe for you. Always read the medicine label and follow directions. Take your medicine as directed. Contact your healthcare provider if you think your medicine is not helping or if you have side effects. Tell your provider if you are allergic to any medicine. Keep a list of the medicines, vitamins, and herbs you take. Include the amounts, and when and why you take them. Bring the list or the pill bottles to follow-up visits. Carry your medicine list with you in case of an emergency. Care for your incision area:  Keep the area clean and dry. Cover it with plastic before you take a shower. Do not take a bath or swim until your surgeon tells you it is okay. Check the area every day for signs of infection, such as swelling, red streaks, or pus. A fever may also be a sign of infection. Ask about activity:  Your surgeon will tell you when it is okay to use your shoulder and arm. Until then, do not use your arm to lift anything. Do not use your arm to move around, push yourself up from lying down, or to change positions. Your surgeon will tell you when it is okay to drive, go back to work, and do your daily activities. Apply ice to your shoulder: Ice may decrease pain, swelling, and muscle spasms. Use an ice pack, or put crushed ice in a plastic bag. Cover it with a towel and place it on your shoulder for 15 to 20 minutes every hour or as directed. Ice may be helpful for 10 to 14 days after surgery. Use a sling as directed: You may need to use an abduction immobilizer sling for 4 to 6 weeks after surgery. The amount of time will depend on how severe your tear was. The sling will prevent arm movement while the tendon heals. A pillow attached to the sling holds your arm away from your body. This position decreases pressure on your wound, and helps blood flow to the surgery area. Your healthcare provider will tell you how long to use the sling each day.   Go to physical therapy for as long as directed: Physical therapy is done in stages. Each stage helps increase motion or strength, and helps relieve or prevent stiffness. Your surgeon and physical therapist will create a plan for your therapy. The plan will be based on your age, health, and recovery goals. The following are general guidelines:  Stage 1 is the first 4 to 8 weeks after surgery. Physical therapy may begin soon after surgery. At first, a physical therapist will work with you to do safe exercises that will allow your rotator cuff to heal. This is called passive exercise because your physical therapist will be moving your arm for you. After a few weeks, the therapist may suggest therapy in a pool. The water allows you to move your arm with very little stress on your shoulder. The water also provides resistance. This helps increase muscle strength. Stage 2 continues to weeks 8 to 12. At this stage, your physical therapist may have you start doing active exercises. These are exercises you will do without the help of your physical therapist. Exercises include using your shoulder more. You will begin exercises such as raising and stretching your arm. Do only those exercises that you have been shown. Do them only as often as you are told to do them. Stage 3 continues to weeks 12 to 16. At this stage, you will begin doing exercises that make your shoulder muscles stronger. Your physical therapy will focus on range of motion (ROM) without pain. You will also increase endurance. Your physical therapist will help you stretch and strengthen your shoulder. You may be shown how to use resistance bands. At this stage, you can expect to have about 80% to 90% of your ROM again. This will depend on the type of tear you had repaired. You will need to be able to do strengthening exercises and daily activities without pain to move to the next phase. Stage 4 continues to weeks 16 to 26. This stage is for advanced strengthening.  Exercises will target specific areas of your shoulder. You will still not be able to lift your arm over your head or play sports in this stage. Your physical therapist will help you build the strength and stability needed for these activities. The goal is to have full ROM that is the same in both arms, and no pain at rest or during activities. These will be needed before you can return to sports or overhead lifting. It may take up to a year to return to all of your regular daily activities after you have a rotator cuff tear repair. Follow up with your surgeon or orthopedist as directed: Ask when you should return to have your stitches taken out. Write down your questions so you remember to ask them during your visits. © Copyright [employer] 2022 Information is for End User's use only and may not be sold, redistributed or otherwise used for commercial purposes. The above information is an  only. It is not intended as medical advice for individual conditions or treatments. Talk to your doctor, nurse or pharmacist before following any medical regimen to see if it is safe and effective for you.

## 2023-07-27 NOTE — PROGRESS NOTES
[facility]        NAME: Brandie Lerma is a 77 y.o. male  : 1955    MRN: [id number]  DATE: 2023  TIME: 12:46 PM    Assessment and Plan   Acute pain of left shoulder [M25.512]  1. Acute pain of left shoulder  XR shoulder 2+ vw left    acetaminophen (TYLENOL) 650 mg CR tablet    Diclofenac Sodium (VOLTAREN) 1 %    Ambulatory Referral to Physical Therapy    Ambulatory Referral to Orthopedic Surgery            Patient Instructions     The preliminary x-rays of your left shoulder look normal however we will wait for the official report and if there are any significant findings I will contact you to develop a new treatment plan. In the meantime,    Tylenol 650 mg 1 tablet every 8 hours as needed for pain. Diclofenac 1% gel apply to affected area 3 times daily as needed for pain    Ice the region for 15 minutes 3 times daily with skin barrier. Orthopedic referral generated on your behalf. Physical therapy referral generated on your behalf. Follow up with PCP in 3-5 days. Proceed to  ER if symptoms worsen. Chief Complaint     Chief Complaint   Patient presents with   • Shoulder Injury     Patient states that he picked up the back of his trailer and injured his left shoulder 3 days ago, complains of pain of left shoulder,, elbow and arm. History of Present Illness       24-year-old male is presenting with complaint of left shoulder pain for 3 days status post lifting and pushing a trailer onto his garage. Pain is throbbing, dull, and aching. Intermitting, aggravated with certain movements of the left shoulder. No alleviating factor other than resting. Has used icing and over-the-counter Tylenol with minimal effect. Patient has a prior history of left shoulder injury. He denies numbness, paresthesia, or weakness. Review of Systems   Review of Systems   Constitutional: Negative for activity change, appetite change and fever. Respiratory: Negative for cough. Gastrointestinal: Negative for nausea. Musculoskeletal: Negative for joint swelling. Skin: Negative for color change and wound. Neurological: Negative for headaches.          Current Medications       Current Outpatient Medications:   •  acetaminophen (TYLENOL) 650 mg CR tablet, Take 1 tablet (650 mg total) by mouth every 8 (eight) hours as needed for mild pain, Disp: 30 tablet, Rfl: 0  •  aspirin (ECOTRIN LOW STRENGTH) 81 mg EC tablet, Take 1 tablet (81 mg total) by mouth daily, Disp: 30 tablet, Rfl: 0  •  atorvastatin (LIPITOR) 40 mg tablet, Take 1 tablet (40 mg total) by mouth daily, Disp: 90 tablet, Rfl: 3  •  cetirizine (ZyrTEC) 10 mg tablet, Take 10 mg by mouth daily, Disp: , Rfl:   •  Diclofenac Sodium (VOLTAREN) 1 %, Apply 2 g topically 4 (four) times a day, Disp: 50 g, Rfl: 0  •  fluticasone (FLONASE) 50 mcg/act nasal spray, 1 spray into each nostril daily (Patient taking differently: 1 spray into each nostril if needed), Disp: 18.2 mL, Rfl: 1  •  furosemide (LASIX) 20 mg tablet, Take 1 tablet (20 mg total) by mouth daily, Disp: 90 tablet, Rfl: 3  •  imatinib (GLEEVEC) 400 mg tablet, Take 400 mg by mouth daily, Disp: , Rfl:   •  metoprolol tartrate (LOPRESSOR) 25 mg tablet, Take 1 tablet (25 mg total) by mouth every 12 (twelve) hours, Disp: 180 tablet, Rfl: 3  •  potassium chloride (K-DUR,KLOR-CON) 20 mEq tablet, Take 1 tablet (20 mEq total) by mouth daily, Disp: 90 tablet, Rfl: 3  •  ergocalciferol (VITAMIN D2) 50,000 units, Take 1 capsule (50,000 Units total) by mouth once a week for 8 doses, Disp: 8 capsule, Rfl: 0  •  lisinopril (ZESTRIL) 2.5 mg tablet, Take 1 tablet (2.5 mg total) by mouth daily (Patient not taking: Reported on 7/27/2023), Disp: 90 tablet, Rfl: 1    Current Allergies     Allergies as of 07/27/2023   • (No Known Allergies)            The following portions of the patient's history were reviewed and updated as appropriate: allergies, current medications, past family history, past medical history, past social history, past surgical history and problem list.     Past Medical History:   Diagnosis Date   • Cancer ([address]) 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   • Chronic kidney disease    • Hyperlipidemia    • Hypertension    • Myocardial infarction [facility])        Past Surgical History:   Procedure Laterality Date   • ABDOMINAL SURGERY      had a gist   tumour  removed   • CORONARY ANGIOPLASTY WITH STENT PLACEMENT  2019   • EGD     • FL TYMPANOSTOMY GENERAL ANESTHESIA Right 1/19/2023    Procedure: MYRINGOTOMY W/ INSERTION VENTILATION TUBE EAR;  Surgeon: Ryan Barry MD;  Location: [facility];  Service: ENT       Family History   Problem Relation Age of Onset   • Alzheimer's disease Father          Medications have been verified. Objective   /70   Temp 97.8 °F (36.6 °C)   Resp 18   Ht 5' 10" (1.778 m)   Wt 94.8 kg (209 lb)   SpO2 95%   BMI 29.99 kg/m²        Physical Exam     Physical Exam  Vitals and nursing note reviewed. Constitutional:       General: He is not in acute distress. Appearance: Normal appearance. He is not ill-appearing. Eyes:      Extraocular Movements: Extraocular movements intact. Conjunctiva/sclera: Conjunctivae normal.      Pupils: Pupils are equal, round, and reactive to light. Cardiovascular:      Rate and Rhythm: Normal rate and regular rhythm. Pulses: Normal pulses. Heart sounds: Normal heart sounds. Pulmonary:      Effort: Pulmonary effort is normal. No respiratory distress. Musculoskeletal:      Right shoulder: Normal.      Left shoulder: Tenderness present. No swelling, deformity, effusion, laceration, bony tenderness or crepitus. Normal range of motion. Normal strength. Normal pulse. Arms:       Cervical back: Normal range of motion and neck supple. Skin:     General: Skin is warm. Findings: No bruising, erythema, lesion or rash. Neurological:      General: No focal deficit present.       Mental Status: He is alert and oriented to person, place, and time. Coordination: Coordination normal.      Gait: Gait normal.   Psychiatric:         Mood and Affect: Mood normal.         Behavior: Behavior normal.         Thought Content:  Thought content normal.         Judgment: Judgment normal.

## 2023-08-14 ENCOUNTER — OFFICE VISIT (OUTPATIENT)
Dept: OBGYN CLINIC | Facility: CLINIC | Age: 68
End: 2023-08-14
Payer: COMMERCIAL

## 2023-08-14 ENCOUNTER — TELEPHONE (OUTPATIENT)
Age: 68
End: 2023-08-14

## 2023-08-14 VITALS
SYSTOLIC BLOOD PRESSURE: 144 MMHG | HEIGHT: 70 IN | WEIGHT: 210 LBS | HEART RATE: 84 BPM | DIASTOLIC BLOOD PRESSURE: 92 MMHG | BODY MASS INDEX: 30.06 KG/M2

## 2023-08-14 DIAGNOSIS — M25.512 ACUTE PAIN OF LEFT SHOULDER: ICD-10-CM

## 2023-08-14 DIAGNOSIS — M54.12 CERVICAL RADICULOPATHY: Primary | ICD-10-CM

## 2023-08-14 DIAGNOSIS — M54.2 NECK PAIN: ICD-10-CM

## 2023-08-14 PROCEDURE — 99204 OFFICE O/P NEW MOD 45 MIN: CPT | Performed by: ORTHOPAEDIC SURGERY

## 2023-08-14 RX ORDER — METHYLPREDNISOLONE 4 MG/1
TABLET ORAL
Qty: 21 TABLET | Refills: 0 | Status: SHIPPED | OUTPATIENT
Start: 2023-08-14

## 2023-08-14 NOTE — TELEPHONE ENCOUNTER
Patient called about a prescription for a steroid dose margaret that was suppose to be sent to his pharmacy today from an office visit he had today with Dr Bess Resides.  Please review and send rx to pharmacy if appropriate

## 2023-08-14 NOTE — PROGRESS NOTES
Patient Name:  Ger Keene  MRN:  [id number]    [address] [address]     1. Cervical radiculopathy    2. Acute pain of left shoulder  -     Ambulatory Referral to Orthopedic Surgery    3. Neck pain        77 y.o. male with left upper extremity radicular symptoms  · X-rays reviewed in office today with patient. · Treatment options were discussed including oral steroid pack, physical therapy  · A script was written for a steroid pack. Potential side effects were discussed in detail. · Patient instructed to proceed forward with physical therapy to work on range of motion, strengthening, and postural exercises. · Follow up in 3-4 months. We will consider x-rays of cervical spine if symptoms persist or worsen. Chief Complaint     Left shoulder pain    History of the Present Illness     Ger Keene is a [facility] 77 y.o. male with Left shoulder pain for about 3 weeks. He was putting his tractor away and felt initial pain while pulling the tail end of the tractor into the garage. He states he hurt his shoulder about 15 years ago, but was never treated. Pain is rated at 8/10. Pain is managed with Tylenol and icy hot patches. Pain has stayed the same over 3 weeks since the injury. He complains of numbness and tingling in his hand whenever he is laying in bed. He also complains of neck pain. He is retired. Review of Systems     Review of Systems   Constitutional: Negative for chills and fever. HENT: Negative for ear pain and sore throat. Eyes: Negative for pain and visual disturbance. Respiratory: Negative for cough and shortness of breath. Cardiovascular: Negative for chest pain and palpitations. Gastrointestinal: Negative for abdominal pain and vomiting. Genitourinary: Negative for dysuria and hematuria. Musculoskeletal: Positive for arthralgias, myalgias and neck pain. Negative for back pain. Skin: Negative for color change and rash. Neurological: Negative for seizures and syncope. All other systems reviewed and are negative. Physical Exam     /92   Pulse 84   Ht 5' 10" (1.778 m)   Wt 95.3 kg (210 lb)   BMI 30.13 kg/m²     Left  Shoulder: Active range of motion   180 degrees forward flexion  170 degrees abduction  70 degrees external rotation   Equal lower thoracic internal rotation      There is tenderness present over the left trapezoid and rhomboid muscles. Empty can testing is negative   Sarkar test is negative   [place]'s test is negative    Speed's test is Negative    The patient is neurovascularly intact distally in the extremity. There is 5/5 strength with supraspinatus testing. There is 5/5 strength with infraspinatus testing. There is 5/5 strength with subscapularis testing. Cervical Spine:   Active range of motion restricted in  extension and rotation left. There is no midline tenderness. There is on left paraspinal hypertonicity and tenderness. Sensation equal to light touch C5 through T1 dermatomes left upper extremity. Sensation equal to light touch C5 through T1 dermatomes right upper extremity. Left Motor: 5/5 biceps, 5/5 triceps, 5/5 wrist extension, 5/5 finger flexion, 5/5 finger abduction   Right Motor: 5/5 biceps, 5/5 triceps, 5/5 wrist extension, 5/5 finger flexion, 5/5 finger abduction   Spurling test is  negative  Jacobo's sign negative    Eyes:  Anicteric sclerae. Neck:  Supple. Lungs:  Normal respiratory effort. Cardiovascular:  Capillary refill is less than 2 seconds. Skin:  Intact without erythema. Neurologic:  Sensation grossly intact to light touch. Psychiatric:  Mood and affect are appropriate. Data Review     I have personally reviewed pertinent films in PACS, and my interpretation follows:    X-rays taken 7/27/23 of Left shoulder independently reviewed and demonstrate no acute fracture, no degenerative changes. Glenohumeral joint space well-maintained.     Past Medical History:   Diagnosis Date   • Cancer ([address]) 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   • Chronic kidney disease    • Hyperlipidemia    • Hypertension    • Myocardial infarction [facility])        Past Surgical History:   Procedure Laterality Date   • ABDOMINAL SURGERY      had a gist   tumour  removed   • CORONARY ANGIOPLASTY WITH STENT PLACEMENT  2019   • EGD     • [place] TYMPANOSTOMY GENERAL ANESTHESIA Right 1/19/2023    Procedure: MYRINGOTOMY W/ INSERTION VENTILATION TUBE EAR;  Surgeon: Saumya Petersen MD;  Location: [facility];  Service: ENT       No Known Allergies    Current Outpatient Medications on File Prior to Visit   Medication Sig Dispense Refill   • acetaminophen (TYLENOL) 650 mg CR tablet Take 1 tablet (650 mg total) by mouth every 8 (eight) hours as needed for mild pain 30 tablet 0   • aspirin (ECOTRIN LOW STRENGTH) 81 mg EC tablet Take 1 tablet (81 mg total) by mouth daily 30 tablet 0   • atorvastatin (LIPITOR) 40 mg tablet Take 1 tablet (40 mg total) by mouth daily 90 tablet 3   • cetirizine (ZyrTEC) 10 mg tablet Take 10 mg by mouth daily     • Diclofenac Sodium (VOLTAREN) 1 % Apply 2 g topically 4 (four) times a day 50 g 0   • fluticasone (FLONASE) 50 mcg/act nasal spray 1 spray into each nostril daily (Patient taking differently: 1 spray into each nostril if needed) 18.2 mL 1   • furosemide (LASIX) 20 mg tablet Take 1 tablet (20 mg total) by mouth daily 90 tablet 3   • imatinib (GLEEVEC) 400 mg tablet Take 400 mg by mouth daily     • lisinopril (ZESTRIL) 2.5 mg tablet Take 1 tablet (2.5 mg total) by mouth daily 90 tablet 1   • metoprolol tartrate (LOPRESSOR) 25 mg tablet Take 1 tablet (25 mg total) by mouth every 12 (twelve) hours 180 tablet 3   • ergocalciferol (VITAMIN D2) 50,000 units Take 1 capsule (50,000 Units total) by mouth once a week for 8 doses 8 capsule 0   • potassium chloride (K-DUR,KLOR-CON) 20 mEq tablet Take 1 tablet (20 mEq total) by mouth daily 90 tablet 3     No current facility-administered medications on file prior to visit.        Social History Tobacco Use   • Smoking status: Former     Packs/day: 1.00     Years: 44.00     Total pack years: 44.00     Types: Cigarettes     Start date: 6/15/1975     Quit date: 2019     Years since quittin.2     Passive exposure: Current   • Smokeless tobacco: Never   Vaping Use   • Vaping Use: Never used   Substance Use Topics   • Alcohol use: Never   • Drug use: Never       Family History   Problem Relation Age of Onset   • Alzheimer's disease Father              Procedures Performed     Procedures  None performed      Seguin Level  Scribe Attestation    I,:  Seguin Level am acting as a scribe while in the presence of the attending physician.:       I,:  Gilbert Varner DO personally performed the services described in this documentation    as scribed in my presence.:

## 2023-09-25 ENCOUNTER — OFFICE VISIT (OUTPATIENT)
Dept: CARDIOLOGY CLINIC | Facility: CLINIC | Age: 68
End: 2023-09-25
Payer: COMMERCIAL

## 2023-09-25 VITALS
OXYGEN SATURATION: 97 % | SYSTOLIC BLOOD PRESSURE: 128 MMHG | WEIGHT: 208 LBS | RESPIRATION RATE: 16 BRPM | HEIGHT: 70 IN | BODY MASS INDEX: 29.78 KG/M2 | HEART RATE: 75 BPM | DIASTOLIC BLOOD PRESSURE: 84 MMHG

## 2023-09-25 DIAGNOSIS — I25.10 CORONARY ARTERY DISEASE INVOLVING NATIVE CORONARY ARTERY OF NATIVE HEART WITHOUT ANGINA PECTORIS: ICD-10-CM

## 2023-09-25 DIAGNOSIS — D21.4 GIST (GASTROINTESTINAL STROMAL TUMOR), NON-MALIGNANT: Primary | ICD-10-CM

## 2023-09-25 PROCEDURE — 99213 OFFICE O/P EST LOW 20 MIN: CPT | Performed by: INTERNAL MEDICINE

## 2023-09-25 NOTE — PROGRESS NOTES
Cardiology Follow Up    Jayant Thomas  1955  [id number]  [facility]  [address] [place] [address] [phone]    1. GIST (gastrointestinal stromal tumor), non-malignant  -     Echo follow up/limited w/ contrast if indicated; Future; Expected date: 09/25/2024    2. Coronary artery disease involving native coronary artery of native heart without angina pectoris          Interval History: 78-year-old man had right coronary related myocardial infarction with symptoms of "an elephant on my chest". He developed heart failure afterwards. Repeat echo shows normal left ventricular systolic function. Because he takes [address] we also checked a GLS which also was normal.    He is physically active without symptoms.     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, malignant (HCC)   • CAD (coronary artery disease)   • Stage 3a chronic kidney disease (HCC)   • Prediabetes   • Vitamin D deficiency   • Chronic kidney disease-mineral and bone disorder   • Acute on chronic diastolic (congestive) heart failure (HCC)   • Systolic congestive heart failure (HCC)     Past Medical History:   Diagnosis Date   • Cancer ([address] ) 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   • Chronic kidney disease    • Hyperlipidemia    • Hypertension    • Myocardial infarction [facility])      Social History     Socioeconomic History   • Marital status: /Civil Union     Spouse name: Not on file   • Number of children: Not on file   • Years of education: Not on file   • Highest education level: Not on file   Occupational History   • Not on file   Tobacco Use   • Smoking status: Former     Packs/day: 1.00     Years: 44.00 Total pack years: 44.00     Types: Cigarettes     Start date: 6/15/1975     Quit date: 2019     Years since quittin.3     Passive exposure: Current   • Smokeless tobacco: Never   Vaping Use   • Vaping Use: Never used   Substance and Sexual Activity   • Alcohol use: Never   • Drug use: Never   • Sexual activity: Not Currently     Partners: Female   Other Topics Concern   • Not on file   Social History Narrative   • Not on file     Social Determinants of Health     Financial Resource Strain: Low Risk  (11/10/2022)    Overall Financial Resource Strain (CARDIA)    • Difficulty of Paying Living Expenses: Not very hard   Food Insecurity: Not on file   Transportation Needs: No Transportation Needs (11/10/2022)    PRAPARE - Transportation    • Lack of Transportation (Medical): No    • Lack of Transportation (Non-Medical):  No   Physical Activity: Not on file   Stress: Not on file   Social Connections: Not on file   Intimate Partner Violence: Not on file   Housing Stability: Not on file      Family History   Problem Relation Age of Onset   • Alzheimer's disease Father      Past Surgical History:   Procedure Laterality Date   • ABDOMINAL SURGERY      had a gist   tumour  removed   • CORONARY ANGIOPLASTY WITH STENT PLACEMENT     • EGD     • [place] TYMPANOSTOMY GENERAL ANESTHESIA Right 2023    Procedure: MYRINGOTOMY W/ INSERTION VENTILATION TUBE EAR;  Surgeon: Elroy Baeza MD;  Location: [facility];  Service: ENT       Current Outpatient Medications:   •  acetaminophen (TYLENOL) 650 mg CR tablet, Take 1 tablet (650 mg total) by mouth every 8 (eight) hours as needed for mild pain, Disp: 30 tablet, Rfl: 0  •  aspirin (ECOTRIN LOW STRENGTH) 81 mg EC tablet, Take 1 tablet (81 mg total) by mouth daily, Disp: 30 tablet, Rfl: 0  •  atorvastatin (LIPITOR) 40 mg tablet, Take 1 tablet (40 mg total) by mouth daily, Disp: 90 tablet, Rfl: 3  •  cetirizine (ZyrTEC) 10 mg tablet, Take 10 mg by mouth daily, Disp: , Rfl:   •  Diclofenac Sodium (VOLTAREN) 1 %, Apply 2 g topically 4 (four) times a day, Disp: 50 g, Rfl: 0  •  ergocalciferol (VITAMIN D2) 50,000 units, Take 1 capsule (50,000 Units total) by mouth once a week for 8 doses, Disp: 8 capsule, Rfl: 0  •  fluticasone (FLONASE) 50 mcg/act nasal spray, 1 spray into each nostril daily (Patient taking differently: 1 spray into each nostril if needed), Disp: 18.2 mL, Rfl: 1  •  imatinib (GLEEVEC) 400 mg tablet, Take 400 mg by mouth daily, Disp: , Rfl:   •  methylPREDNISolone 4 MG tablet therapy pack, Use as directed on package, Disp: 21 tablet, Rfl: 0  •  metoprolol tartrate (LOPRESSOR) 25 mg tablet, Take 1 tablet (25 mg total) by mouth every 12 (twelve) hours, Disp: 180 tablet, Rfl: 3  •  lisinopril (ZESTRIL) 2.5 mg tablet, Take 1 tablet (2.5 mg total) by mouth daily, Disp: 90 tablet, Rfl: 1  No Known Allergies    Labs:  No visits with results within 2 Month(s) from this visit.    Latest known visit with results is:   Appointment on 07/10/2023   Component Date Value   • Sodium 07/10/2023 145    • Potassium 07/10/2023 4.8    • Chloride 07/10/2023 112 (H)    • CO2 07/10/2023 26    • ANION GAP 07/10/2023 7    • BUN 07/10/2023 19    • Creatinine 07/10/2023 1.79 (H)    • Glucose, Fasting 07/10/2023 117 (H)    • Calcium 07/10/2023 9.2    • eGFR 07/10/2023 37    • WBC 07/10/2023 9.52    • RBC 07/10/2023 4.24    • Hemoglobin 07/10/2023 14.8    • Hematocrit 07/10/2023 45.4    • MCV 07/10/2023 107 (H)    • MCH 07/10/2023 34.9 (H)    • MCHC 07/10/2023 32.6    • RDW 07/10/2023 14.5    • MPV 07/10/2023 10.1    • Platelets 98/41/2416 399 (H)    • nRBC 07/10/2023 0    • Neutrophils Relative 07/10/2023 56    • Immat GRANS % 07/10/2023 0    • Lymphocytes Relative 07/10/2023 25    • Monocytes Relative 07/10/2023 13 (H)    • Eosinophils Relative 07/10/2023 5    • Basophils Relative 07/10/2023 1    • Neutrophils Absolute 07/10/2023 5.34    • Immature Grans Absolute 07/10/2023 0.02    • Lymphocytes Absolute 07/10/2023 2.36    • Monocytes Absolute 07/10/2023 1.23 (H)    • Eosinophils Absolute 07/10/2023 0.49    • Basophils Absolute 07/10/2023 0.08    • Phosphorus 07/10/2023 1.7 (L)    • PTH 07/10/2023 75.0    • Vit D, 25-Hydroxy 07/10/2023 33.7      Imaging: No results found. Review of Systems:  Review of Systems    Physical Exam:  128/84. Heart rate 75 and regular. Lungs clear. Carotids 2+ without bruits. No murmurs or gallops. No organomegaly. No edema. Discussion/Summary:    1. Coronary artery disease without angina pectoris  2. [address] therapy for GIST tumor    Recommendations:    1.  DC furosemide  2. Recheck GLS and ejection fraction in 1 year and return afterwards.       Tristen Osborn MD

## 2023-10-17 ENCOUNTER — APPOINTMENT (OUTPATIENT)
Age: 68
End: 2023-10-17
Payer: COMMERCIAL

## 2023-10-17 DIAGNOSIS — N18.31 STAGE 3A CHRONIC KIDNEY DISEASE (HCC): ICD-10-CM

## 2023-10-17 DIAGNOSIS — M89.9 CHRONIC KIDNEY DISEASE-MINERAL AND BONE DISORDER: ICD-10-CM

## 2023-10-17 DIAGNOSIS — N18.9 CHRONIC KIDNEY DISEASE-MINERAL AND BONE DISORDER: ICD-10-CM

## 2023-10-17 DIAGNOSIS — E83.9 CHRONIC KIDNEY DISEASE-MINERAL AND BONE DISORDER: ICD-10-CM

## 2023-10-17 LAB
25(OH)D3 SERPL-MCNC: 30.1 NG/ML (ref 30–100)
ANION GAP SERPL CALCULATED.3IONS-SCNC: 8 MMOL/L
BACTERIA UR QL AUTO: ABNORMAL /HPF
BASOPHILS # BLD AUTO: 0.06 THOUSANDS/ÂΜL (ref 0–0.1)
BASOPHILS NFR BLD AUTO: 1 % (ref 0–1)
BILIRUB UR QL STRIP: NEGATIVE
BUN SERPL-MCNC: 14 MG/DL (ref 5–25)
CALCIUM SERPL-MCNC: 8.7 MG/DL (ref 8.4–10.2)
CHLORIDE SERPL-SCNC: 109 MMOL/L (ref 96–108)
CLARITY UR: ABNORMAL
CO2 SERPL-SCNC: 27 MMOL/L (ref 21–32)
COLOR UR: YELLOW
CREAT SERPL-MCNC: 1.13 MG/DL (ref 0.6–1.3)
CREAT UR-MCNC: 228.8 MG/DL
EOSINOPHIL # BLD AUTO: 0.61 THOUSAND/ÂΜL (ref 0–0.61)
EOSINOPHIL NFR BLD AUTO: 7 % (ref 0–6)
ERYTHROCYTE [DISTWIDTH] IN BLOOD BY AUTOMATED COUNT: 14.7 % (ref 11.6–15.1)
GFR SERPL CREATININE-BSD FRML MDRD: 65 ML/MIN/1.73SQ M
GLUCOSE P FAST SERPL-MCNC: 96 MG/DL (ref 65–99)
GLUCOSE UR STRIP-MCNC: NEGATIVE MG/DL
HCT VFR BLD AUTO: 43 % (ref 36.5–49.3)
HGB BLD-MCNC: 13.8 G/DL (ref 12–17)
HGB UR QL STRIP.AUTO: NEGATIVE
IMM GRANULOCYTES # BLD AUTO: 0.01 THOUSAND/UL (ref 0–0.2)
IMM GRANULOCYTES NFR BLD AUTO: 0 % (ref 0–2)
KETONES UR STRIP-MCNC: NEGATIVE MG/DL
LEUKOCYTE ESTERASE UR QL STRIP: NEGATIVE
LYMPHOCYTES # BLD AUTO: 2.33 THOUSANDS/ÂΜL (ref 0.6–4.47)
LYMPHOCYTES NFR BLD AUTO: 27 % (ref 14–44)
MCH RBC QN AUTO: 35.1 PG (ref 26.8–34.3)
MCHC RBC AUTO-ENTMCNC: 32.1 G/DL (ref 31.4–37.4)
MCV RBC AUTO: 109 FL (ref 82–98)
MONOCYTES # BLD AUTO: 1.42 THOUSAND/ÂΜL (ref 0.17–1.22)
MONOCYTES NFR BLD AUTO: 16 % (ref 4–12)
MUCOUS THREADS UR QL AUTO: ABNORMAL
NEUTROPHILS # BLD AUTO: 4.36 THOUSANDS/ÂΜL (ref 1.85–7.62)
NEUTS SEG NFR BLD AUTO: 49 % (ref 43–75)
NITRITE UR QL STRIP: NEGATIVE
NON-SQ EPI CELLS URNS QL MICRO: ABNORMAL /HPF
NRBC BLD AUTO-RTO: 0 /100 WBCS
PH UR STRIP.AUTO: 5.5 [PH]
PHOSPHATE SERPL-MCNC: 1.9 MG/DL (ref 2.3–4.1)
PLATELET # BLD AUTO: 436 THOUSANDS/UL (ref 149–390)
PMV BLD AUTO: 9.7 FL (ref 8.9–12.7)
POTASSIUM SERPL-SCNC: 4.6 MMOL/L (ref 3.5–5.3)
PROT UR STRIP-MCNC: ABNORMAL MG/DL
PROT UR-MCNC: 36 MG/DL
PROT/CREAT UR: 0.16 MG/G{CREAT} (ref 0–0.1)
PTH-INTACT SERPL-MCNC: 62.4 PG/ML (ref 12–88)
RBC # BLD AUTO: 3.93 MILLION/UL (ref 3.88–5.62)
RBC #/AREA URNS AUTO: ABNORMAL /HPF
SODIUM SERPL-SCNC: 144 MMOL/L (ref 135–147)
SP GR UR STRIP.AUTO: 1.02 (ref 1–1.03)
UROBILINOGEN UR STRIP-ACNC: <2 MG/DL
WBC # BLD AUTO: 8.79 THOUSAND/UL (ref 4.31–10.16)
WBC #/AREA URNS AUTO: ABNORMAL /HPF

## 2023-10-17 PROCEDURE — 84100 ASSAY OF PHOSPHORUS: CPT

## 2023-10-17 PROCEDURE — 82570 ASSAY OF URINE CREATININE: CPT

## 2023-10-17 PROCEDURE — 83970 ASSAY OF PARATHORMONE: CPT

## 2023-10-17 PROCEDURE — 85025 COMPLETE CBC W/AUTO DIFF WBC: CPT

## 2023-10-17 PROCEDURE — 36415 COLL VENOUS BLD VENIPUNCTURE: CPT

## 2023-10-17 PROCEDURE — 80048 BASIC METABOLIC PNL TOTAL CA: CPT

## 2023-10-17 PROCEDURE — 82306 VITAMIN D 25 HYDROXY: CPT

## 2023-10-17 PROCEDURE — 84156 ASSAY OF PROTEIN URINE: CPT

## 2023-10-17 PROCEDURE — 81001 URINALYSIS AUTO W/SCOPE: CPT

## 2023-11-09 ENCOUNTER — OFFICE VISIT (OUTPATIENT)
Dept: OTOLARYNGOLOGY | Facility: CLINIC | Age: 68
End: 2023-11-09
Payer: COMMERCIAL

## 2023-11-09 VITALS — HEIGHT: 70 IN | WEIGHT: 208 LBS | BODY MASS INDEX: 29.78 KG/M2 | TEMPERATURE: 97.5 F

## 2023-11-09 DIAGNOSIS — Z96.22 HISTORY OF TYMPANOSTOMY TUBE PLACEMENT: Primary | ICD-10-CM

## 2023-11-09 PROCEDURE — 99213 OFFICE O/P EST LOW 20 MIN: CPT | Performed by: OTOLARYNGOLOGY

## 2023-11-09 NOTE — PROGRESS NOTES
Assessment/Plan:  Right PE tube starting to extrude. No recurrence of effusion. F/u in 6 months. Diagnosis ICD-10-CM Associated Orders   1. History of tympanostomy tube placement  Z96.22              Subjective:      Patient ID: Astrid Jarrett is a 77 y.o. male. F/u for right PE tube. No c/o. The following portions of the patient's history were reviewed and updated as appropriate: allergies, current medications, past family history, past medical history, past social history, past surgical history and problem list.    Review of Systems      Objective:      Temp 97.5 °F (36.4 °C) (Temporal)   Ht 5' 10" (1.778 m)   Wt 94.3 kg (208 lb)   BMI 29.84 kg/m²          Physical Exam  HENT:      Right Ear: Tympanic membrane, ear canal and external ear normal. No middle ear effusion. A PE tube (starting to extrude) is present. Left Ear: Tympanic membrane, ear canal and external ear normal.  No middle ear effusion.

## 2023-11-16 ENCOUNTER — OFFICE VISIT (OUTPATIENT)
Dept: OBGYN CLINIC | Facility: CLINIC | Age: 68
End: 2023-11-16
Payer: COMMERCIAL

## 2023-11-16 VITALS
HEIGHT: 70 IN | SYSTOLIC BLOOD PRESSURE: 137 MMHG | WEIGHT: 210 LBS | BODY MASS INDEX: 30.06 KG/M2 | DIASTOLIC BLOOD PRESSURE: 80 MMHG | HEART RATE: 61 BPM

## 2023-11-16 DIAGNOSIS — M54.12 CERVICAL RADICULOPATHY: Primary | ICD-10-CM

## 2023-11-16 DIAGNOSIS — M54.2 NECK PAIN: ICD-10-CM

## 2023-11-16 PROCEDURE — 99213 OFFICE O/P EST LOW 20 MIN: CPT | Performed by: ORTHOPAEDIC SURGERY

## 2023-11-16 NOTE — PROGRESS NOTES
Patient Name:  Mignon Stephen  MRN:  [id number]    [address] [address]     1. Cervical radiculopathy    2. Neck pain      77 y.o. male with left upper extremity radicular symptoms, overall significant improvement since last visit. Treatment options were discussed including physical therapy, cervical x-rays, referral to spine and pain management  Patient is doing well and is happy with improvement. He declined further therapy or other modalities. He will follow up as needed for reevaluation and cervical x-rays at next visit if symptoms persist     History of the Present Illness   Mignon Stephen is a RHD 77 y.o. male with 77 y.o. male with left upper extremity radicular symptoms. He was last seen in clinic 8/14/23 when he was prescribed an oral steroid pack and physical therapy. He is not complaining of shoulder and neck pain today. He has tingling in his forearm while sitting in a chair. His symptoms are not limiting his activity. Review of Systems     Review of Systems   Constitutional:  Negative for chills and fever. HENT:  Negative for ear pain and sore throat. Eyes:  Negative for pain and visual disturbance. Respiratory:  Negative for cough and shortness of breath. Cardiovascular:  Negative for chest pain and palpitations. Gastrointestinal:  Negative for abdominal pain and vomiting. Genitourinary:  Negative for dysuria and hematuria. Musculoskeletal:  Negative for arthralgias and back pain. Skin:  Negative for color change and rash. Neurological:  Negative for seizures and syncope. All other systems reviewed and are negative. Physical Exam     /80   Pulse 61   Ht 5' 10" (1.778 m)   Wt 95.3 kg (210 lb)   BMI 30.13 kg/m²     Left  Shoulder: Active range of motion   160 degrees forward flexion  160 degrees abduction  50 degrees external rotation   Equal lower thoracic internal rotation    There is 5/5 strength with supraspinatus testing.   There is 5/5 strength with infraspinatus testing. There is 5/5 strength with subscapularis testing. There is no tenderness present over the shoulder. The patient is neurovascularly intact distally in the extremity. Cervical Spine:   Active range of motion restricted rotation to the right in extension. There is no midline tenderness. There is on left paraspinal hypertonicity and tenderness. Sensation intact to light touch C5 through T1 dermatomes left upper extremity. Sensation intact to light touch C5 through T1 dermatomes right upper extremity. Left Motor: 5/5 biceps, 5/5 triceps, 5/5 wrist extension, 5/5 finger flexion, 5/5 finger abduction   Right Motor: 5/5 biceps, 5/5 triceps, 5/5 wrist extension, 5/5 finger flexion, 5/5 finger abduction   Spurling test is  negative  Jacobo's sign negative    Data Review     I have personally reviewed pertinent films in PACS, and my interpretation follows. X-rays taken 23 of Left shoulder independently reviewed and demonstrate no acute fracture, no degenerative changes. Glenohumeral joint space well-maintained.      Social History     Tobacco Use    Smoking status: Former     Packs/day: 1.00     Years: 44.00     Total pack years: 44.00     Types: Cigarettes     Start date: 6/15/1975     Quit date: 2019     Years since quittin.5     Passive exposure: Current    Smokeless tobacco: Never   Vaping Use    Vaping Use: Never used   Substance Use Topics    Alcohol use: Never    Drug use: Never           Procedures  None    Geovanna Ruiz DO   Scribe Attestation      I,:  Gini Perdue am acting as a scribe while in the presence of the attending physician.:       I,:  Geovanna Ruiz DO personally performed the services described in this documentation    as scribed in my presence.:

## 2023-12-20 ENCOUNTER — TELEPHONE (OUTPATIENT)
Dept: FAMILY MEDICINE CLINIC | Facility: CLINIC | Age: 68
End: 2023-12-20

## 2024-01-04 DIAGNOSIS — N18.31 STAGE 3A CHRONIC KIDNEY DISEASE (HCC): Primary | ICD-10-CM

## 2024-01-05 ENCOUNTER — APPOINTMENT (OUTPATIENT)
Age: 69
End: 2024-01-05
Payer: COMMERCIAL

## 2024-01-05 DIAGNOSIS — N18.31 STAGE 3A CHRONIC KIDNEY DISEASE (HCC): ICD-10-CM

## 2024-01-05 LAB
25(OH)D3 SERPL-MCNC: 23.2 NG/ML (ref 30–100)
ACANTHOCYTES BLD QL SMEAR: PRESENT
ANION GAP SERPL CALCULATED.3IONS-SCNC: 12 MMOL/L
BACTERIA UR QL AUTO: ABNORMAL /HPF
BASOPHILS # BLD MANUAL: 0 THOUSAND/UL (ref 0–0.1)
BASOPHILS NFR MAR MANUAL: 0 % (ref 0–1)
BILIRUB UR QL STRIP: NEGATIVE
BUN SERPL-MCNC: 20 MG/DL (ref 5–25)
CALCIUM SERPL-MCNC: 8.8 MG/DL (ref 8.4–10.2)
CHLORIDE SERPL-SCNC: 108 MMOL/L (ref 96–108)
CLARITY UR: ABNORMAL
CO2 SERPL-SCNC: 25 MMOL/L (ref 21–32)
COLOR UR: ABNORMAL
CREAT SERPL-MCNC: 1.21 MG/DL (ref 0.6–1.3)
CREAT UR-MCNC: 211.8 MG/DL
EOSINOPHIL # BLD MANUAL: 0.39 THOUSAND/UL (ref 0–0.4)
EOSINOPHIL NFR BLD MANUAL: 4 % (ref 0–6)
ERYTHROCYTE [DISTWIDTH] IN BLOOD BY AUTOMATED COUNT: 14 % (ref 11.6–15.1)
GFR SERPL CREATININE-BSD FRML MDRD: 60 ML/MIN/1.73SQ M
GLUCOSE P FAST SERPL-MCNC: 98 MG/DL (ref 65–99)
GLUCOSE UR STRIP-MCNC: NEGATIVE MG/DL
HCT VFR BLD AUTO: 45.5 % (ref 36.5–49.3)
HGB BLD-MCNC: 14.8 G/DL (ref 12–17)
HGB UR QL STRIP.AUTO: NEGATIVE
KETONES UR STRIP-MCNC: NEGATIVE MG/DL
LEUKOCYTE ESTERASE UR QL STRIP: NEGATIVE
LYMPHOCYTES # BLD AUTO: 26 % (ref 14–44)
LYMPHOCYTES # BLD AUTO: 3.59 THOUSAND/UL (ref 0.6–4.47)
MCH RBC QN AUTO: 34 PG (ref 26.8–34.3)
MCHC RBC AUTO-ENTMCNC: 32.5 G/DL (ref 31.4–37.4)
MCV RBC AUTO: 105 FL (ref 82–98)
MONOCYTES # BLD AUTO: 1.46 THOUSAND/UL (ref 0–1.22)
MONOCYTES NFR BLD: 15 % (ref 4–12)
MUCOUS THREADS UR QL AUTO: ABNORMAL
NEUTROPHILS # BLD MANUAL: 4.27 THOUSAND/UL (ref 1.85–7.62)
NEUTS BAND NFR BLD MANUAL: 1 % (ref 0–8)
NEUTS SEG NFR BLD AUTO: 43 % (ref 43–75)
NITRITE UR QL STRIP: NEGATIVE
NON-SQ EPI CELLS URNS QL MICRO: ABNORMAL /HPF
PH UR STRIP.AUTO: 5.5 [PH]
PHOSPHATE SERPL-MCNC: 2.5 MG/DL (ref 2.3–4.1)
PLATELET # BLD AUTO: 407 THOUSANDS/UL (ref 149–390)
PLATELET BLD QL SMEAR: ADEQUATE
PMV BLD AUTO: 9.6 FL (ref 8.9–12.7)
POIKILOCYTOSIS BLD QL SMEAR: PRESENT
POTASSIUM SERPL-SCNC: 4.4 MMOL/L (ref 3.5–5.3)
PROT UR STRIP-MCNC: ABNORMAL MG/DL
PROT UR-MCNC: 38 MG/DL
PROT/CREAT UR: 0.18 MG/G{CREAT} (ref 0–0.1)
PTH-INTACT SERPL-MCNC: 75 PG/ML (ref 12–88)
RBC # BLD AUTO: 4.35 MILLION/UL (ref 3.88–5.62)
RBC #/AREA URNS AUTO: ABNORMAL /HPF
RBC MORPH BLD: PRESENT
SODIUM SERPL-SCNC: 145 MMOL/L (ref 135–147)
SP GR UR STRIP.AUTO: 1.03 (ref 1–1.03)
TARGETS BLD QL SMEAR: PRESENT
UROBILINOGEN UR STRIP-ACNC: <2 MG/DL
VARIANT LYMPHS # BLD AUTO: 11 %
WBC # BLD AUTO: 9.71 THOUSAND/UL (ref 4.31–10.16)
WBC #/AREA URNS AUTO: ABNORMAL /HPF

## 2024-01-05 PROCEDURE — 85007 BL SMEAR W/DIFF WBC COUNT: CPT

## 2024-01-05 PROCEDURE — 83970 ASSAY OF PARATHORMONE: CPT

## 2024-01-05 PROCEDURE — 80048 BASIC METABOLIC PNL TOTAL CA: CPT

## 2024-01-05 PROCEDURE — 84100 ASSAY OF PHOSPHORUS: CPT

## 2024-01-05 PROCEDURE — 82570 ASSAY OF URINE CREATININE: CPT

## 2024-01-05 PROCEDURE — 84156 ASSAY OF PROTEIN URINE: CPT

## 2024-01-05 PROCEDURE — 81001 URINALYSIS AUTO W/SCOPE: CPT

## 2024-01-05 PROCEDURE — 36415 COLL VENOUS BLD VENIPUNCTURE: CPT

## 2024-01-05 PROCEDURE — 82306 VITAMIN D 25 HYDROXY: CPT

## 2024-01-05 PROCEDURE — 85027 COMPLETE CBC AUTOMATED: CPT

## 2024-01-15 ENCOUNTER — OFFICE VISIT (OUTPATIENT)
Dept: NEPHROLOGY | Facility: CLINIC | Age: 69
End: 2024-01-15
Payer: COMMERCIAL

## 2024-01-15 VITALS
SYSTOLIC BLOOD PRESSURE: 130 MMHG | HEIGHT: 70 IN | WEIGHT: 214.2 LBS | RESPIRATION RATE: 18 BRPM | TEMPERATURE: 99.4 F | HEART RATE: 79 BPM | DIASTOLIC BLOOD PRESSURE: 80 MMHG | BODY MASS INDEX: 30.67 KG/M2 | OXYGEN SATURATION: 98 %

## 2024-01-15 DIAGNOSIS — I50.33 ACUTE ON CHRONIC DIASTOLIC (CONGESTIVE) HEART FAILURE (HCC): ICD-10-CM

## 2024-01-15 DIAGNOSIS — E83.9 CHRONIC KIDNEY DISEASE-MINERAL AND BONE DISORDER: ICD-10-CM

## 2024-01-15 DIAGNOSIS — N18.9 CHRONIC KIDNEY DISEASE-MINERAL AND BONE DISORDER: ICD-10-CM

## 2024-01-15 DIAGNOSIS — I50.20 SYSTOLIC CONGESTIVE HEART FAILURE, UNSPECIFIED HF CHRONICITY (HCC): ICD-10-CM

## 2024-01-15 DIAGNOSIS — M89.9 CHRONIC KIDNEY DISEASE-MINERAL AND BONE DISORDER: ICD-10-CM

## 2024-01-15 DIAGNOSIS — N18.31 STAGE 3A CHRONIC KIDNEY DISEASE (HCC): Primary | ICD-10-CM

## 2024-01-15 PROCEDURE — 99214 OFFICE O/P EST MOD 30 MIN: CPT | Performed by: INTERNAL MEDICINE

## 2024-01-15 NOTE — PROGRESS NOTES
NEPHROLOGY OFFICE FOLLOW UP  Ladarius Tabares 68 y.o. male MRN: [id number]    Encounter: [id number] 1/15/2024    REASON FOR VISIT: Ladarius Tabares is a 68 y.o. male who is here on 1/15/2024 for Chronic Kidney Disease and Follow-up  .    HPI:    Ladarius came in today for follow-up of CKD.  He is doing well overall denies any acute complaint    No chest pain no palpitation or shortness of breath    No nausea no vomiting    Denies any urinary complaint        REVIEW OF SYSTEMS:    Review of Systems   Constitutional:  Negative for fatigue.   HENT:  Negative for congestion.    Eyes:  Negative for photophobia and pain.   Respiratory:  Negative for chest tightness and shortness of breath.    Cardiovascular:  Negative for chest pain and palpitations.   Gastrointestinal:  Negative for abdominal distention, abdominal pain and blood in stool.   Endocrine: Negative for polydipsia.   Genitourinary:  Negative for difficulty urinating, dysuria, flank pain, hematuria and urgency.   Musculoskeletal:  Negative for arthralgias and back pain.   Skin:  Negative for rash.   Neurological:  Negative for dizziness, light-headedness and headaches.   Hematological:  Does not bruise/bleed easily.   Psychiatric/Behavioral:  Negative for behavioral problems. The patient is not nervous/anxious.          PAST MEDICAL HISTORY:  Past Medical History:   Diagnosis Date    Cancer (HCC) 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    Chronic kidney disease     Hyperlipidemia     Hypertension     Myocardial infarction (HCC)        PAST SURGICAL HISTORY:  Past Surgical History:   Procedure Laterality Date    ABDOMINAL SURGERY      had a gist   tumour  removed    CORONARY ANGIOPLASTY WITH STENT PLACEMENT  2019    EGD      [place] TYMPANOSTOMY GENERAL ANESTHESIA Right 1/19/2023    Procedure: MYRINGOTOMY W/ INSERTION VENTILATION TUBE EAR;  Surgeon: Bernardino Farrell MD;  Location: [facility] OR;  Service: ENT       SOCIAL HISTORY:  Social History     Substance and Sexual Activity  "  Alcohol Use Never     Social History     Substance and Sexual Activity   Drug Use Never     Social History     Tobacco Use   Smoking Status Former    Current packs/day: 0.00    Average packs/day: 1 pack/day for 44.0 years (44.0 ttl pk-yrs)    Types: Cigarettes    Start date: 6/15/1975    Quit date: 2019    Years since quittin.6    Passive exposure: Current   Smokeless Tobacco Never       FAMILY HISTORY:  Family History   Problem Relation Age of Onset    Alzheimer's disease Father        MEDICATIONS:    Current Outpatient Medications:     acetaminophen (TYLENOL) 650 mg CR tablet, Take 1 tablet (650 mg total) by mouth every 8 (eight) hours as needed for mild pain, Disp: 30 tablet, Rfl: 0    aspirin (ECOTRIN LOW STRENGTH) 81 mg EC tablet, Take 1 tablet (81 mg total) by mouth daily, Disp: 30 tablet, Rfl: 0    atorvastatin (LIPITOR) 40 mg tablet, Take 1 tablet (40 mg total) by mouth daily, Disp: 90 tablet, Rfl: 3    cetirizine (ZyrTEC) 10 mg tablet, Take 10 mg by mouth daily, Disp: , Rfl:     Diclofenac Sodium (VOLTAREN) 1 %, Apply 2 g topically 4 (four) times a day (Patient taking differently: Apply 2 g topically if needed), Disp: 50 g, Rfl: 0    ergocalciferol (VITAMIN D2) 50,000 units, Take 1 capsule (50,000 Units total) by mouth once a week for 8 doses, Disp: 8 capsule, Rfl: 0    fluticasone (FLONASE) 50 mcg/act nasal spray, 1 spray into each nostril daily, Disp: 18.2 mL, Rfl: 1    imatinib (GLEEVEC) 400 mg tablet, Take 400 mg by mouth daily, Disp: , Rfl:     metoprolol tartrate (LOPRESSOR) 25 mg tablet, Take 1 tablet (25 mg total) by mouth every 12 (twelve) hours, Disp: 180 tablet, Rfl: 3    PHYSICAL EXAM:  Vitals:    01/15/24 1105   BP: 130/80   BP Location: Right arm   Patient Position: Sitting   Pulse: 79   Resp: 18   Temp: 99.4 °F (37.4 °C)   TempSrc: Temporal   SpO2: 98%   Weight: 97.2 kg (214 lb 3.2 oz)   Height: 5' 10\" (1.778 m)     Body mass index is 30.73 kg/m².    Physical Exam  Constitutional:  "      General: He is not in acute distress.     Appearance: He is well-developed.   HENT:      Head: Normocephalic.      Mouth/Throat:      Mouth: Mucous membranes are moist.   Eyes:      General: No scleral icterus.     Conjunctiva/sclera: Conjunctivae normal.   Neck:      Vascular: No JVD.   Cardiovascular:      Rate and Rhythm: Normal rate.      Heart sounds: Normal heart sounds.   Pulmonary:      Effort: Pulmonary effort is normal.      Breath sounds: No wheezing.   Abdominal:      Palpations: Abdomen is soft.      Tenderness: There is no abdominal tenderness.   Musculoskeletal:         General: Normal range of motion.      Cervical back: Neck supple.   Skin:     General: Skin is warm.      Findings: No rash.   Neurological:      Mental Status: He is alert and oriented to person, place, and time.   Psychiatric:         Behavior: Behavior normal.         LAB RESULTS:  Results for orders placed or performed in visit on 01/05/24   Basic metabolic panel   Result Value Ref Range    Sodium 145 135 - 147 mmol/L    Potassium 4.4 3.5 - 5.3 mmol/L    Chloride 108 96 - 108 mmol/L    CO2 25 21 - 32 mmol/L    ANION GAP 12 mmol/L    BUN 20 5 - 25 mg/dL    Creatinine 1.21 0.60 - 1.30 mg/dL    Glucose, Fasting 98 65 - 99 mg/dL    Calcium 8.8 8.4 - 10.2 mg/dL    eGFR 60 ml/min/1.73sq m   CBC and differential   Result Value Ref Range    WBC 9.71 4.31 - 10.16 Thousand/uL    RBC 4.35 3.88 - 5.62 Million/uL    Hemoglobin 14.8 12.0 - 17.0 g/dL    Hematocrit 45.5 36.5 - 49.3 %     (H) 82 - 98 fL    MCH 34.0 26.8 - 34.3 pg    MCHC 32.5 31.4 - 37.4 g/dL    RDW 14.0 11.6 - 15.1 %    MPV 9.6 8.9 - 12.7 fL    Platelets 407 (H) 149 - 390 Thousands/uL   Phosphorus   Result Value Ref Range    Phosphorus 2.5 2.3 - 4.1 mg/dL   PTH, intact   Result Value Ref Range    PTH 75.0 12.0 - 88.0 pg/mL   Vitamin D 25 hydroxy   Result Value Ref Range    Vit D, 25-Hydroxy 23.2 (L) 30.0 - 100.0 ng/mL   Manual Differential(PHLEBS Do Not Order)   Result  "Value Ref Range    Segmented % 43 43 - 75 %    Bands % 1 0 - 8 %    Lymphocytes % 26 14 - 44 %    Monocytes % 15 (H) 4 - 12 %    Eosinophils, % 4 0 - 6 %    Basophils % 0 0 - 1 %    Atypical Lymphocytes % 11 (H) <=0 %    Absolute Neutrophils 4.27 1.85 - 7.62 Thousand/uL    Lymphocytes Absolute 3.59 0.60 - 4.47 Thousand/uL    Monocytes Absolute 1.46 (H) 0.00 - 1.22 Thousand/uL    Eosinophils Absolute 0.39 0.00 - 0.40 Thousand/uL    Basophils Absolute 0.00 0.00 - 0.10 Thousand/uL    Total Counted      RBC Morphology Present     Platelet Estimate Adequate Adequate    Acanthocytes Present     Poikilocytes Present     Target Cells Present        ASSESSMENT and PLAN:      Stage 3a chronic kidney disease (HCC)  Lab Results   Component Value Date    EGFR 60 01/05/2024    EGFR 65 10/17/2023    EGFR 37 07/10/2023    CREATININE 1.21 01/05/2024    CREATININE 1.13 10/17/2023    CREATININE 1.79 (H) 07/10/2023   Renal function is stable overall.  Advise hydration and avoiding nephrotoxic medication    Chronic kidney disease-mineral and bone disorder  Lab Results   Component Value Date    EGFR 60 01/05/2024    EGFR 65 10/17/2023    EGFR 37 07/10/2023    CREATININE 1.21 01/05/2024    CREATININE 1.13 10/17/2023    CREATININE 1.79 (H) 07/10/2023   PTH and phosphorus along with vitamin D are within acceptable range and will continue to monitor    Acute on chronic diastolic (congestive) heart failure (HCC)  Wt Readings from Last 3 Encounters:   01/15/24 97.2 kg (214 lb 3.2 oz)   11/16/23 95.3 kg (210 lb)   11/09/23 94.3 kg (208 lb)       Quite asymptomatic          Everything discussed with the patient at length.  I will see him back in 6 months.  We will get blood and urine test before that visit      Portions of the record may have been created with voice recognition software. Occasional wrong word or \"sound a like\" substitutions may have occurred due to the inherent limitations of voice recognition software. Read the chart carefully " and recognize, using context, where substitutions have occurred.If you have any questions, please contact the dictating provider.

## 2024-01-15 NOTE — ASSESSMENT & PLAN NOTE
Lab Results   Component Value Date    EGFR 60 01/05/2024    EGFR 65 10/17/2023    EGFR 37 07/10/2023    CREATININE 1.21 01/05/2024    CREATININE 1.13 10/17/2023    CREATININE 1.79 (H) 07/10/2023   PTH and phosphorus along with vitamin D are within acceptable range and will continue to monitor

## 2024-01-15 NOTE — ASSESSMENT & PLAN NOTE
Wt Readings from Last 3 Encounters:   01/15/24 97.2 kg (214 lb 3.2 oz)   11/16/23 95.3 kg (210 lb)   11/09/23 94.3 kg (208 lb)       Quite asymptomatic

## 2024-01-15 NOTE — ASSESSMENT & PLAN NOTE
Lab Results   Component Value Date    EGFR 60 01/05/2024    EGFR 65 10/17/2023    EGFR 37 07/10/2023    CREATININE 1.21 01/05/2024    CREATININE 1.13 10/17/2023    CREATININE 1.79 (H) 07/10/2023   Renal function is stable overall.  Advise hydration and avoiding nephrotoxic medication

## 2024-02-05 ENCOUNTER — VBI (OUTPATIENT)
Dept: ADMINISTRATIVE | Facility: OTHER | Age: 69
End: 2024-02-05

## 2024-03-06 ENCOUNTER — TELEPHONE (OUTPATIENT)
Age: 69
End: 2024-03-06

## 2024-03-06 NOTE — TELEPHONE ENCOUNTER
Pt called in with pain in his ear and congestion feeling. Pt states he is taking tylenol for the pain. Pt was wondering if he could have a sooner appt. Called office and pt is being placed on wait list. Advised pt per office to continue with the tylenol, ibuprofen and ice in the meantime and that the tube in his ear will be addressed at his followup visit in 2 months.

## 2024-05-09 ENCOUNTER — TELEPHONE (OUTPATIENT)
Age: 69
End: 2024-05-09

## 2024-05-09 ENCOUNTER — OFFICE VISIT (OUTPATIENT)
Dept: OTOLARYNGOLOGY | Facility: CLINIC | Age: 69
End: 2024-05-09
Payer: COMMERCIAL

## 2024-05-09 ENCOUNTER — OFFICE VISIT (OUTPATIENT)
Dept: AUDIOLOGY | Facility: CLINIC | Age: 69
End: 2024-05-09
Payer: COMMERCIAL

## 2024-05-09 VITALS — WEIGHT: 214 LBS | HEIGHT: 70 IN | BODY MASS INDEX: 30.64 KG/M2 | TEMPERATURE: 97.8 F

## 2024-05-09 DIAGNOSIS — Z96.22 HISTORY OF TYMPANOSTOMY TUBE PLACEMENT: ICD-10-CM

## 2024-05-09 DIAGNOSIS — H90.A31 MIXED CONDUCTIVE AND SENSORINEURAL HEARING LOSS OF RIGHT EAR WITH RESTRICTED HEARING OF LEFT EAR: Primary | ICD-10-CM

## 2024-05-09 DIAGNOSIS — H93.8X1 CLOGGED EAR, RIGHT: ICD-10-CM

## 2024-05-09 DIAGNOSIS — H65.21 RIGHT CHRONIC SEROUS OTITIS MEDIA: Primary | ICD-10-CM

## 2024-05-09 DIAGNOSIS — C49.A3 GIST (GASTROINTESTINAL STROMAL TUMOR) OF SMALL BOWEL, MALIGNANT (HCC): ICD-10-CM

## 2024-05-09 DIAGNOSIS — H65.21 RIGHT CHRONIC SEROUS OTITIS MEDIA: ICD-10-CM

## 2024-05-09 PROCEDURE — 92567 TYMPANOMETRY: CPT | Performed by: AUDIOLOGIST

## 2024-05-09 PROCEDURE — 92557 COMPREHENSIVE HEARING TEST: CPT | Performed by: AUDIOLOGIST

## 2024-05-09 PROCEDURE — 99214 OFFICE O/P EST MOD 30 MIN: CPT | Performed by: OTOLARYNGOLOGY

## 2024-05-09 NOTE — PROGRESS NOTES
"Assessment/Plan:  Right PE tube extruded and effusion has recurred.  Pt scheduled for repeat right M&T with a long-term T-tube.  Updated audiogram ordered.      Diagnosis ICD-10-CM Associated Orders   1. Right chronic serous otitis media  H65.21 Ambulatory referral to Audiology      2. History of tympanostomy tube placement  Z96.22       3. Clogged ear, right  H93.8X1 Ambulatory referral to Audiology      4. 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 male.    Right ear feels clogged again, since the winter.        The following portions of the patient's history were reviewed and updated as appropriate: allergies, current medications, past family history, past medical history, past social history, past surgical history and problem list.    Review of Systems      Objective:      Temp 97.8 °F (36.6 °C) (Temporal)   Ht 5' 10\" (1.778 m)   Wt 97.1 kg (214 lb)   BMI 30.71 kg/m²          Physical Exam  Constitutional:       Appearance: He is well-developed.   HENT:      Head: Normocephalic and atraumatic.      Right Ear: Ear canal and external ear normal. No drainage. A middle ear effusion is present.      Left Ear: Tympanic membrane, ear canal and external ear normal. No drainage.  No middle ear effusion.      Nose: Nose normal.      Mouth/Throat:      Pharynx: Uvula midline. No oropharyngeal exudate.      Tonsils: 1+ on the right. 1+ on the left.   Neck:      Thyroid: No thyroid mass or thyromegaly.      Trachea: Trachea normal. No tracheal deviation.   Cardiovascular:      Heart sounds: Normal heart sounds, S1 normal and S2 normal.   Pulmonary:      Breath sounds: Normal breath sounds and air entry.   Abdominal:      General: Bowel sounds are normal.      Palpations: Abdomen is soft.   Lymphadenopathy:      Cervical: No cervical adenopathy.   Neurological:      Mental Status: He is alert.         "

## 2024-05-09 NOTE — PROGRESS NOTES
ENT HEARING EVALUATION    Name:  Ladarius Tabares  :  1955  Age:  68 y.o.   MRN:  [id number]  Date of Evaluation: 24     HISTORY:    Reason for visit:  R decreased hearing, PE tube out of TM     Ladarius Tabares is being seen today for an evaluation of hearing as part of their ENT visit.     EVALUATION:    Otoscopy revealed a clear canal, left side / PE tube visualized, in wax, in the canal near the opening   Per Dr. Farrell, I removed the tube (and wax) with a curette w/o incident     Tympanometry:   Right Ear:  Type As / B  not a consistent reading see tracings    Left Ear: Type A; normal middle ear pressure and static compliance     Speech Audiometry:  Speech Reception (SRT)   Right Ear: 40 dB HL   Left Ear: 15 dB HL    Word Recognition Scores (WRS):  Right Ear: good (88 % correct)     Left Ear: excellent (100 % correct)   Stimuli: [id number]    Pure Tone Audiometry:  Conventional pure tone audiometry from 250 - 8000 Hz  was obtained with good reliability and revealed the following:     Right Ear: Mild sloping to severe mixed hearing loss (MHL)    Left Ear: Normal sloping to severe sensorineural hearing loss (SNHL)  commencing at 3000 Hz     *see attached audiogram    RECOMMENDATIONS:  Follow up per Dr. Farrell / patient is scheduled for a R PE tube   Re-evaluate right hearing following completion of the PE tube   Results discussed with the patient     Jessie Gómez, CCC-A  Clinical Audiologist  [id number]  [phone]

## 2024-05-09 NOTE — TELEPHONE ENCOUNTER
Attempted to contact patient, but phone was disconnected.  Should patient call back, please transfer to me.

## 2024-06-17 DIAGNOSIS — I21.11 ST ELEVATION MYOCARDIAL INFARCTION INVOLVING RIGHT CORONARY ARTERY (HCC): ICD-10-CM

## 2024-06-17 NOTE — TELEPHONE ENCOUNTER
Reason for call:   [x] Refill   [] Prior Auth  [] Other:     Office:   [] PCP/Provider -   [x] Specialty/Provider -Ephraim Estrada/ [facility]         Medication: Lopressor    Dose/Frequency: 25 mg    Quantity: #180    Pharmacy: [facility] [address] [place] [address]    Does the patient have enough for 3 days?   [] Yes   [] No - Send as HP to POD

## 2024-07-10 ENCOUNTER — TELEPHONE (OUTPATIENT)
Dept: FAMILY MEDICINE CLINIC | Facility: CLINIC | Age: 69
End: 2024-07-10

## 2024-07-10 NOTE — TELEPHONE ENCOUNTER
Called patient and he was on the road but will call us back to make his awv appointment. He also confirmed the phone number.  Thanks

## 2024-07-15 ENCOUNTER — TELEPHONE (OUTPATIENT)
Dept: NEPHROLOGY | Facility: CLINIC | Age: 69
End: 2024-07-15

## 2024-07-15 NOTE — TELEPHONE ENCOUNTER
Spoke with patient- he stated that he has a lot going on at the moment and kindly asked that we give him a call back on this beginning of August.

## 2024-07-15 NOTE — PRE-PROCEDURE INSTRUCTIONS
Pre-Surgery Instructions:   Medication Instructions    aspirin (ECOTRIN LOW STRENGTH) 81 mg EC tablet Stop taking 4 days prior to surgery.    atorvastatin (LIPITOR) 40 mg tablet Take night before surgery    cetirizine (ZyrTEC) 10 mg tablet Uses PRN- OK to take day of surgery    fluticasone (FLONASE) 50 mcg/act nasal spray Uses PRN- OK to take day of surgery    metoprolol tartrate (LOPRESSOR) 25 mg tablet Take day of surgery.    Medication instructions for day surgery reviewed. Please use only a sip of water to take your instructed medications. Avoid all over the counter vitamins, supplements and NSAIDS for one week prior to surgery per anesthesia guidelines. Tylenol is ok to take as needed.     You will receive a call one business day prior to surgery with an arrival time and hospital directions. If your surgery is scheduled on a Monday, the hospital will be calling you on the Friday prior to your surgery. If you have not heard from anyone by 8pm, please call the hospital supervisor through the hospital  at [phone]. ([place] [phone] or [place] [phone]).    Do not eat or drink anything after midnight the night before your surgery, including candy, mints, lifesavers, or chewing gum. Do not drink alcohol 24hrs before your surgery. Try not to smoke at least 24hrs before your surgery.       Follow the pre surgery showering instructions as listed in the “My Surgical Experience Booklet” or otherwise provided by your surgeon's office. Do not use a blade to shave the surgical area 1 week before surgery. It is okay to use a clean electric clippers up to 24 hours before surgery. Do not apply any lotions, creams, including makeup, cologne, deodorant, or perfumes after showering on the day of your surgery. Do not use dry shampoo, hair spray, hair gel, or any type of hair products.     No contact lenses, eye make-up, or artificial eyelashes. Remove nail polish, including gel polish, and any artificial,  gel, or acrylic nails if possible. Remove all jewelry including rings and body piercing jewelry.     Wear causal clothing that is easy to take on and off. Consider your type of surgery.    Keep any valuables, jewelry, piercings at home. Please bring any specially ordered equipment (sling, braces) if indicated.    Arrange for a responsible person to drive you to and from the hospital on the day of your surgery. Please confirm the visitor policy for the day of your procedure when you receive your phone call with an arrival time.     Call the surgeon's office with any new illnesses, exposures, or additional questions prior to surgery.    Please reference your “My Surgical Experience Booklet” for additional information to prepare for your upcoming surgery.

## 2024-07-18 ENCOUNTER — ANESTHESIA EVENT (OUTPATIENT)
Dept: PERIOP | Facility: HOSPITAL | Age: 69
End: 2024-07-18
Payer: COMMERCIAL

## 2024-07-18 ENCOUNTER — HOSPITAL ENCOUNTER (OUTPATIENT)
Facility: HOSPITAL | Age: 69
Setting detail: OUTPATIENT SURGERY
Discharge: HOME | End: 2024-07-18
Attending: OTOLARYNGOLOGY | Admitting: OTOLARYNGOLOGY
Payer: COMMERCIAL

## 2024-07-18 ENCOUNTER — ANESTHESIA (OUTPATIENT)
Dept: PERIOP | Facility: HOSPITAL | Age: 69
End: 2024-07-18
Payer: COMMERCIAL

## 2024-07-18 VITALS
TEMPERATURE: 98.4 F | OXYGEN SATURATION: 94 % | HEIGHT: 70 IN | BODY MASS INDEX: 30.9 KG/M2 | RESPIRATION RATE: 16 BRPM | HEART RATE: 57 BPM | SYSTOLIC BLOOD PRESSURE: 127 MMHG | WEIGHT: 215.83 LBS | DIASTOLIC BLOOD PRESSURE: 61 MMHG

## 2024-07-18 PROCEDURE — NC001 PR NO CHARGE: Performed by: OTOLARYNGOLOGY

## 2024-07-18 PROCEDURE — 69436 CREATE EARDRUM OPENING: CPT | Performed by: OTOLARYNGOLOGY

## 2024-07-18 DEVICE — VENT TUBE 1056019 5PK T TUBE 1.14MM
Type: IMPLANTABLE DEVICE | Site: EAR | Status: FUNCTIONAL
Brand: T-TUBE® C-FLEX®

## 2024-07-18 RX ORDER — OFLOXACIN 3 MG/ML
SOLUTION/ DROPS OPHTHALMIC AS NEEDED
Status: DISCONTINUED | OUTPATIENT
Start: 2024-07-18 | End: 2024-07-18 | Stop reason: HOSPADM

## 2024-07-18 RX ORDER — LIDOCAINE HYDROCHLORIDE 10 MG/ML
INJECTION, SOLUTION EPIDURAL; INFILTRATION; INTRACAUDAL; PERINEURAL AS NEEDED
Status: DISCONTINUED | OUTPATIENT
Start: 2024-07-18 | End: 2024-07-18

## 2024-07-18 RX ORDER — MAGNESIUM HYDROXIDE 1200 MG/15ML
LIQUID ORAL AS NEEDED
Status: DISCONTINUED | OUTPATIENT
Start: 2024-07-18 | End: 2024-07-18 | Stop reason: HOSPADM

## 2024-07-18 RX ORDER — FENTANYL CITRATE 50 UG/ML
INJECTION, SOLUTION INTRAMUSCULAR; INTRAVENOUS AS NEEDED
Status: DISCONTINUED | OUTPATIENT
Start: 2024-07-18 | End: 2024-07-18

## 2024-07-18 RX ORDER — ONDANSETRON 2 MG/ML
INJECTION INTRAMUSCULAR; INTRAVENOUS AS NEEDED
Status: DISCONTINUED | OUTPATIENT
Start: 2024-07-18 | End: 2024-07-18

## 2024-07-18 RX ORDER — FENTANYL CITRATE/PF 50 MCG/ML
25 SYRINGE (ML) INJECTION
Status: DISCONTINUED | OUTPATIENT
Start: 2024-07-18 | End: 2024-07-18 | Stop reason: HOSPADM

## 2024-07-18 RX ORDER — SODIUM CHLORIDE, SODIUM LACTATE, POTASSIUM CHLORIDE, CALCIUM CHLORIDE 600; 310; 30; 20 MG/100ML; MG/100ML; MG/100ML; MG/100ML
125 INJECTION, SOLUTION INTRAVENOUS CONTINUOUS
Status: DISCONTINUED | OUTPATIENT
Start: 2024-07-18 | End: 2024-07-18 | Stop reason: HOSPADM

## 2024-07-18 RX ORDER — PROPOFOL 10 MG/ML
INJECTION, EMULSION INTRAVENOUS AS NEEDED
Status: DISCONTINUED | OUTPATIENT
Start: 2024-07-18 | End: 2024-07-18

## 2024-07-18 RX ADMIN — SODIUM CHLORIDE, SODIUM LACTATE, POTASSIUM CHLORIDE, AND CALCIUM CHLORIDE 125 ML/HR: .6; .31; .03; .02 INJECTION, SOLUTION INTRAVENOUS at 11:53

## 2024-07-18 RX ADMIN — ONDANSETRON 4 MG: 2 INJECTION INTRAMUSCULAR; INTRAVENOUS at 13:06

## 2024-07-18 RX ADMIN — FENTANYL CITRATE 50 MCG: 50 INJECTION, SOLUTION INTRAMUSCULAR; INTRAVENOUS at 13:06

## 2024-07-18 RX ADMIN — LIDOCAINE HYDROCHLORIDE 50 MG: 10 INJECTION, SOLUTION EPIDURAL; INFILTRATION; INTRACAUDAL; PERINEURAL at 13:06

## 2024-07-18 RX ADMIN — FENTANYL CITRATE 50 MCG: 50 INJECTION, SOLUTION INTRAMUSCULAR; INTRAVENOUS at 13:15

## 2024-07-18 RX ADMIN — PROPOFOL 200 MG: 10 INJECTION, EMULSION INTRAVENOUS at 13:06

## 2024-07-18 NOTE — ANESTHESIA POSTPROCEDURE EVALUATION
Post-Op Assessment Note    CV Status:  Stable  Pain Score: 0    Pain management: adequate       Mental Status:  Sleepy and arousable   Hydration Status:  Stable   PONV Controlled:  Controlled   Airway Patency:  Patent and adequate     Post Op Vitals Reviewed: Yes    No anethesia notable event occurred.    Staff: MALCOLM               /58 (07/18/24 1330)    Temp 98.4 °F (36.9 °C) (07/18/24 1330)    Pulse 60 (07/18/24 1330)   Resp 16 (07/18/24 1330)    SpO2

## 2024-07-18 NOTE — OP NOTE
OPERATIVE REPORT  PATIENT NAME: Ladarius Tabares    :  1955  MRN: [id number]  Pt Location: [facility] OR ROOM 02    SURGERY DATE: 2024    Surgeons and Role:     * Bernardino Farrell MD - Primary    Preop Diagnosis:  History of tympanostomy tube placement [Z96.22]  Clogged ear, right [H93.8X1]  Right chronic serous otitis media [H65.21]    Post-Op Diagnosis Codes:     * History of tympanostomy tube placement [Z96.22]     * Clogged ear, right [H93.8X1]     * Right chronic serous otitis media [H65.21]    Procedure(s):  Right - MYRINGOTOMY W/ INSERTION T-TUBE EAR    Specimen(s):  * No specimens in log *    Estimated Blood Loss:   Minimal    Drains:  * No LDAs found *    Anesthesia Type:   General    Operative Indications:  History of tympanostomy tube placement [Z96.22]  Clogged ear, right [H93.8X1]  Right chronic serous otitis media [H65.21]    Operative Findings:  Serous otitis media      Complications:   None    Procedure and Technique:  After induction of general anesthesia via LMA, the patient was draped in the usual sterile fashion.  The right external auditory canal was viewed using the operating microscope.  An anteroinferior myringotomy was performed, and serous effusion was suctioned from the middle ear.  A silicone myringotomy tube was inserted.  The ear canal was irrigated with ofloxacin drops.  At the end of the procedure, all instruments were removed.   I was present for the entire procedure.    Patient Disposition:  PACU         SIGNATURE: Bernardino Farrell MD  DATE: 2024  TIME: 1:21 PM

## 2024-07-18 NOTE — H&P
"H&P Exam - ENT   Ladarius Tabares 69 y.o. male MRN: [id number]  Unit/Bed#: OR [place] Encounter: [id number]    Assessment & Plan     Assessment:  Chronic right serous otitis media due to underlying ETD, with associated conductive hearing loss.  Plan:  Pt scheduled for right M&T with T-tube.    History of Present Illness   HPI:  Ladarius Tabares is a 69 y.o. male who presents with hearing loss AD.    Review of Systems    Historical Information   Past Medical History:   Diagnosis Date    Cancer (HCC)     abd tumour    Chronic kidney disease     Hyperlipidemia     Hypertension     Myocardial infarction (HCC)      Past Surgical History:   Procedure Laterality Date    ABDOMINAL SURGERY      had a gist   tumour  removed    CORONARY ANGIOPLASTY WITH STENT PLACEMENT  2019    x1 stent    EGD      MI TYMPANOSTOMY GENERAL ANESTHESIA Right 2023    Procedure: MYRINGOTOMY W/ INSERTION VENTILATION TUBE EAR;  Surgeon: Bernardino Farrell MD;  Location: [facility] OR;  Service: ENT     Social History   Social History     Substance and Sexual Activity   Alcohol Use Never     Social History     Substance and Sexual Activity   Drug Use Never     Social History     Tobacco Use   Smoking Status Former    Current packs/day: 0.00    Average packs/day: 1 pack/day for 44.0 years (44.0 ttl pk-yrs)    Types: Cigarettes    Start date: 6/15/1975    Quit date: 2019    Years since quittin.1    Passive exposure: Current   Smokeless Tobacco Never     E-Cigarette/Vaping    E-Cigarette Use Never User      E-Cigarette/Vaping Substances    Nicotine No     THC No     CBD No      Family History: non-contributory    Meds/Allergies   all medications and allergies reviewed  No Known Allergies    Objective   Vitals: Blood pressure 163/73, pulse 64, temperature 98.6 °F (37 °C), temperature source Temporal, resp. rate 18, height 5' 10\" (1.778 m), weight 97.9 kg (215 lb 13.3 oz), SpO2 96%.    No intake or output data in the 24 hours ending " 07/18/24 1259    Invasive Devices       Peripheral Intravenous Line  Duration             Peripheral IV 01/19/23 Right Antecubital 545 days    Peripheral IV 07/18/24 Proximal;Right;Ventral (anterior) Forearm <1 day                    Physical Exam  HEENT: right serous OM  Chest: CTA  Heart: RR  Abd: soft, NT, nl BS.    Lab Results: I have personally reviewed pertinent lab results.    Imaging: I have personally reviewed pertinent reports.    EKG, Pathology, and Other Studies: I have personally reviewed pertinent reports.

## 2024-07-18 NOTE — ANESTHESIA PREPROCEDURE EVALUATION
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         Additional Monitors:     Airway Plan: LMA.           Plan Factors-Exercise tolerance (METS): >4 METS.    Chart reviewed.   Existing labs reviewed. Patient summary reviewed.    Patient is not a current smoker.      Obstructive sleep apnea risk education given perioperatively.        Induction- intravenous.    Postoperative Plan-         Informed Consent- Anesthetic plan and risks discussed with patient.  I personally reviewed this patient with the CRNA. Discussed and agreed on the Anesthesia Plan with the CRNA..

## 2024-07-24 ENCOUNTER — APPOINTMENT (OUTPATIENT)
Age: 69
End: 2024-07-24
Payer: COMMERCIAL

## 2024-07-24 DIAGNOSIS — N18.9 CHRONIC KIDNEY DISEASE-MINERAL AND BONE DISORDER: ICD-10-CM

## 2024-07-24 DIAGNOSIS — E83.9 CHRONIC KIDNEY DISEASE-MINERAL AND BONE DISORDER: ICD-10-CM

## 2024-07-24 DIAGNOSIS — N18.31 STAGE 3A CHRONIC KIDNEY DISEASE (HCC): ICD-10-CM

## 2024-07-24 DIAGNOSIS — M89.9 CHRONIC KIDNEY DISEASE-MINERAL AND BONE DISORDER: ICD-10-CM

## 2024-07-24 LAB
25(OH)D3 SERPL-MCNC: 35.9 NG/ML (ref 30–100)
ANION GAP SERPL CALCULATED.3IONS-SCNC: 11 MMOL/L (ref 4–13)
BACTERIA UR QL AUTO: NORMAL /HPF
BASOPHILS # BLD AUTO: 0.1 THOUSANDS/ÂΜL (ref 0–0.1)
BASOPHILS NFR BLD AUTO: 1 % (ref 0–1)
BILIRUB UR QL STRIP: NEGATIVE
BUN SERPL-MCNC: 21 MG/DL (ref 5–25)
CALCIUM SERPL-MCNC: 9.5 MG/DL (ref 8.4–10.2)
CHLORIDE SERPL-SCNC: 104 MMOL/L (ref 96–108)
CLARITY UR: CLEAR
CO2 SERPL-SCNC: 27 MMOL/L (ref 21–32)
COLOR UR: ABNORMAL
CREAT SERPL-MCNC: 1.35 MG/DL (ref 0.6–1.3)
CREAT UR-MCNC: 163 MG/DL
EOSINOPHIL # BLD AUTO: 0.49 THOUSAND/ÂΜL (ref 0–0.61)
EOSINOPHIL NFR BLD AUTO: 4 % (ref 0–6)
ERYTHROCYTE [DISTWIDTH] IN BLOOD BY AUTOMATED COUNT: 13.9 % (ref 11.6–15.1)
GFR SERPL CREATININE-BSD FRML MDRD: 52 ML/MIN/1.73SQ M
GLUCOSE P FAST SERPL-MCNC: 127 MG/DL (ref 65–99)
GLUCOSE UR STRIP-MCNC: NEGATIVE MG/DL
HCT VFR BLD AUTO: 52.3 % (ref 36.5–49.3)
HGB BLD-MCNC: 16.9 G/DL (ref 12–17)
HGB UR QL STRIP.AUTO: NEGATIVE
IMM GRANULOCYTES # BLD AUTO: 0.05 THOUSAND/UL (ref 0–0.2)
IMM GRANULOCYTES NFR BLD AUTO: 0 % (ref 0–2)
KETONES UR STRIP-MCNC: NEGATIVE MG/DL
LEUKOCYTE ESTERASE UR QL STRIP: NEGATIVE
LYMPHOCYTES # BLD AUTO: 3.71 THOUSANDS/ÂΜL (ref 0.6–4.47)
LYMPHOCYTES NFR BLD AUTO: 30 % (ref 14–44)
MCH RBC QN AUTO: 32.3 PG (ref 26.8–34.3)
MCHC RBC AUTO-ENTMCNC: 32.3 G/DL (ref 31.4–37.4)
MCV RBC AUTO: 100 FL (ref 82–98)
MONOCYTES # BLD AUTO: 1.85 THOUSAND/ÂΜL (ref 0.17–1.22)
MONOCYTES NFR BLD AUTO: 15 % (ref 4–12)
NEUTROPHILS # BLD AUTO: 6.08 THOUSANDS/ÂΜL (ref 1.85–7.62)
NEUTS SEG NFR BLD AUTO: 50 % (ref 43–75)
NITRITE UR QL STRIP: NEGATIVE
NON-SQ EPI CELLS URNS QL MICRO: NORMAL /HPF
NRBC BLD AUTO-RTO: 0 /100 WBCS
PH UR STRIP.AUTO: 5.5 [PH]
PHOSPHATE SERPL-MCNC: 3.1 MG/DL (ref 2.3–4.1)
PLATELET # BLD AUTO: 486 THOUSANDS/UL (ref 149–390)
PMV BLD AUTO: 10 FL (ref 8.9–12.7)
POTASSIUM SERPL-SCNC: 4.9 MMOL/L (ref 3.5–5.3)
PROT UR STRIP-MCNC: ABNORMAL MG/DL
PROT UR-MCNC: 11 MG/DL
PROT/CREAT UR: 0.07 MG/G{CREAT} (ref 0–0.1)
PTH-INTACT SERPL-MCNC: 36.1 PG/ML (ref 12–88)
RBC # BLD AUTO: 5.23 MILLION/UL (ref 3.88–5.62)
RBC #/AREA URNS AUTO: NORMAL /HPF
SODIUM SERPL-SCNC: 142 MMOL/L (ref 135–147)
SP GR UR STRIP.AUTO: 1.02 (ref 1–1.03)
UROBILINOGEN UR STRIP-ACNC: <2 MG/DL
WBC # BLD AUTO: 12.28 THOUSAND/UL (ref 4.31–10.16)
WBC #/AREA URNS AUTO: NORMAL /HPF

## 2024-07-24 PROCEDURE — 84100 ASSAY OF PHOSPHORUS: CPT

## 2024-07-24 PROCEDURE — 82570 ASSAY OF URINE CREATININE: CPT

## 2024-07-24 PROCEDURE — 83970 ASSAY OF PARATHORMONE: CPT

## 2024-07-24 PROCEDURE — 36415 COLL VENOUS BLD VENIPUNCTURE: CPT

## 2024-07-24 PROCEDURE — 85025 COMPLETE CBC W/AUTO DIFF WBC: CPT

## 2024-07-24 PROCEDURE — 80048 BASIC METABOLIC PNL TOTAL CA: CPT

## 2024-07-24 PROCEDURE — 82306 VITAMIN D 25 HYDROXY: CPT

## 2024-07-24 PROCEDURE — 84156 ASSAY OF PROTEIN URINE: CPT

## 2024-07-24 PROCEDURE — 81001 URINALYSIS AUTO W/SCOPE: CPT

## 2024-07-25 ENCOUNTER — OFFICE VISIT (OUTPATIENT)
Dept: OTOLARYNGOLOGY | Facility: CLINIC | Age: 69
End: 2024-07-25

## 2024-07-25 VITALS — RESPIRATION RATE: 18 BRPM | WEIGHT: 215.3 LBS | HEIGHT: 70 IN | OXYGEN SATURATION: 95 % | BODY MASS INDEX: 30.82 KG/M2

## 2024-07-25 DIAGNOSIS — H69.91 DYSFUNCTION OF RIGHT EUSTACHIAN TUBE: ICD-10-CM

## 2024-07-25 DIAGNOSIS — Z96.22 HISTORY OF TYMPANOSTOMY TUBE PLACEMENT: Primary | ICD-10-CM

## 2024-07-25 PROCEDURE — 99024 POSTOP FOLLOW-UP VISIT: CPT | Performed by: OTOLARYNGOLOGY

## 2024-07-25 NOTE — PROGRESS NOTES
"Assessment/Plan:  Right T-tube in place and patent.  F/u in 3 months.      Diagnosis ICD-10-CM Associated Orders   1. History of tympanostomy tube placement  Z96.22       2. Dysfunction of right eustachian tube  H69.91              Subjective:      Patient ID: Ladarius Tabares is a 69 y.o. male.    1 wk f/u s/p right M&T.  No c/o.        The following portions of the patient's history were reviewed and updated as appropriate: allergies, current medications, past family history, past medical history, past social history, past surgical history and problem list.    Review of Systems      Objective:      Resp 18   Ht 5' 10\" (1.778 m)   Wt 97.7 kg (215 lb 4.8 oz)   SpO2 95%   BMI 30.89 kg/m²          Physical Exam  Constitutional:       Appearance: He is well-developed.   HENT:      Head: Normocephalic and atraumatic.      Right Ear: Ear canal and external ear normal. No drainage. No middle ear effusion. A PE tube is present.      Left Ear: Tympanic membrane, ear canal and external ear normal. No drainage.  No middle ear effusion.      Nose: Nose normal.      Mouth/Throat:      Pharynx: Uvula midline. No oropharyngeal exudate.      Tonsils: 1+ on the right. 1+ on the left.   Neck:      Thyroid: No thyroid mass or thyromegaly.      Trachea: Trachea normal. No tracheal deviation.   Lymphadenopathy:      Cervical: No cervical adenopathy.   Neurological:      Mental Status: He is alert.         "

## 2024-07-29 ENCOUNTER — OFFICE VISIT (OUTPATIENT)
Dept: NEPHROLOGY | Facility: CLINIC | Age: 69
End: 2024-07-29
Payer: COMMERCIAL

## 2024-07-29 VITALS
BODY MASS INDEX: 31.07 KG/M2 | HEIGHT: 70 IN | OXYGEN SATURATION: 97 % | RESPIRATION RATE: 18 BRPM | DIASTOLIC BLOOD PRESSURE: 80 MMHG | SYSTOLIC BLOOD PRESSURE: 130 MMHG | HEART RATE: 78 BPM | TEMPERATURE: 97.5 F | WEIGHT: 217 LBS

## 2024-07-29 DIAGNOSIS — I25.10 CORONARY ARTERY DISEASE INVOLVING NATIVE CORONARY ARTERY OF NATIVE HEART WITHOUT ANGINA PECTORIS: ICD-10-CM

## 2024-07-29 DIAGNOSIS — E83.9 CHRONIC KIDNEY DISEASE-MINERAL AND BONE DISORDER: ICD-10-CM

## 2024-07-29 DIAGNOSIS — Z90.81 POST-SPLENECTOMY: ICD-10-CM

## 2024-07-29 DIAGNOSIS — N18.31 STAGE 3A CHRONIC KIDNEY DISEASE (HCC): Primary | ICD-10-CM

## 2024-07-29 DIAGNOSIS — I50.20 SYSTOLIC CONGESTIVE HEART FAILURE, UNSPECIFIED HF CHRONICITY (HCC): ICD-10-CM

## 2024-07-29 DIAGNOSIS — I50.33 ACUTE ON CHRONIC DIASTOLIC (CONGESTIVE) HEART FAILURE (HCC): ICD-10-CM

## 2024-07-29 DIAGNOSIS — N18.9 CHRONIC KIDNEY DISEASE-MINERAL AND BONE DISORDER: ICD-10-CM

## 2024-07-29 DIAGNOSIS — M89.9 CHRONIC KIDNEY DISEASE-MINERAL AND BONE DISORDER: ICD-10-CM

## 2024-07-29 PROCEDURE — 99214 OFFICE O/P EST MOD 30 MIN: CPT | Performed by: INTERNAL MEDICINE

## 2024-07-29 NOTE — PROGRESS NOTES
NEPHROLOGY OFFICE FOLLOW UP  Ladarius Tabares 69 y.o. male MRN: [id number]    Encounter: [id number] 7/29/2024    REASON FOR VISIT: Ladarius Tabares is a 69 y.o. male who is here on 7/29/2024 for Chronic Kidney Disease and Follow-up  .    HPI:    Ladarius came in today for follow-up of CKD.  69-year-old gentleman with hypertension and stage IIIa CKD and  proteinuria    Patient feeling well overall denies any acute complaint    Patient had recently ear infection requiring tube insertion and being monitored by ENT    No other acute complaint    No chest pain no palpitation or shortness of breath    No nausea no vomiting    Denies any urinary complaint        REVIEW OF SYSTEMS:    Review of Systems   Constitutional:  Negative for fatigue.   HENT:  Negative for congestion.    Eyes:  Negative for photophobia and pain.   Respiratory:  Negative for chest tightness and shortness of breath.    Cardiovascular:  Negative for chest pain and palpitations.   Gastrointestinal:  Negative for abdominal distention, abdominal pain and blood in stool.   Endocrine: Negative for polydipsia.   Genitourinary:  Negative for difficulty urinating, dysuria, flank pain, hematuria and urgency.   Musculoskeletal:  Negative for arthralgias and back pain.   Skin:  Negative for rash.   Neurological:  Negative for dizziness, light-headedness and headaches.   Hematological:  Does not bruise/bleed easily.   Psychiatric/Behavioral:  Negative for behavioral problems. The patient is not nervous/anxious.          PAST MEDICAL HISTORY:  Past Medical History:   Diagnosis Date    Cancer (HCC) 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    Chronic kidney disease     Hyperlipidemia     Hypertension     Myocardial infarction (HCC) 2019       PAST SURGICAL HISTORY:  Past Surgical History:   Procedure Laterality Date    ABDOMINAL SURGERY      had a gist   tumour  removed    CORONARY ANGIOPLASTY WITH STENT PLACEMENT  2019    x1 stent    EGD      GA TYMPANOSTOMY GENERAL ANESTHESIA Right  "2023    Procedure: MYRINGOTOMY W/ INSERTION VENTILATION TUBE EAR;  Surgeon: Bernardino Farrell MD;  Location: [facility];  Service: ENT       SOCIAL HISTORY:  Social History     Substance and Sexual Activity   Alcohol Use Never     Social History     Substance and Sexual Activity   Drug Use Never     Social History     Tobacco Use   Smoking Status Former    Current packs/day: 0.00    Average packs/day: 1 pack/day for 44.0 years (44.0 ttl pk-yrs)    Types: Cigarettes    Start date: 6/15/1975    Quit date: 2019    Years since quittin.2    Passive exposure: Current   Smokeless Tobacco Never       FAMILY HISTORY:  Family History   Problem Relation Age of Onset    Alzheimer's disease Father        MEDICATIONS:    Current Outpatient Medications:     aspirin (ECOTRIN LOW STRENGTH) 81 mg EC tablet, Take 1 tablet (81 mg total) by mouth daily, Disp: 30 tablet, Rfl: 0    atorvastatin (LIPITOR) 40 mg tablet, Take 1 tablet (40 mg total) by mouth daily, Disp: 90 tablet, Rfl: 3    cetirizine (ZyrTEC) 10 mg tablet, Take 10 mg by mouth daily, Disp: , Rfl:     fluticasone (FLONASE) 50 mcg/act nasal spray, 1 spray into each nostril daily, Disp: 18.2 mL, Rfl: 1    metoprolol tartrate (LOPRESSOR) 25 mg tablet, Take 1 tablet (25 mg total) by mouth every 12 (twelve) hours, Disp: 180 tablet, Rfl: 1    PHYSICAL EXAM:  Vitals:    24 1124   BP: 130/80   BP Location: Right arm   Patient Position: Sitting   Pulse: 78   Resp: 18   Temp: 97.5 °F (36.4 °C)   TempSrc: Temporal   SpO2: 97%   Weight: 98.4 kg (217 lb)   Height: 5' 10\" (1.778 m)     Body mass index is 31.14 kg/m².    Physical Exam  Constitutional:       General: He is not in acute distress.     Appearance: He is well-developed.   HENT:      Head: Normocephalic.      Mouth/Throat:      Mouth: Mucous membranes are moist.   Eyes:      General: No scleral icterus.     Conjunctiva/sclera: Conjunctivae normal.   Neck:      Vascular: No JVD.   Cardiovascular:      Rate " and Rhythm: Normal rate.      Heart sounds: Normal heart sounds.   Pulmonary:      Effort: Pulmonary effort is normal.      Breath sounds: No wheezing.   Abdominal:      Palpations: Abdomen is soft.      Tenderness: There is no abdominal tenderness.   Musculoskeletal:         General: Normal range of motion.      Cervical back: Neck supple.   Skin:     General: Skin is warm.      Findings: No rash.   Neurological:      Mental Status: He is alert and oriented to person, place, and time.   Psychiatric:         Behavior: Behavior normal.         LAB RESULTS:  Results for orders placed or performed in visit on 07/24/24   Basic metabolic panel   Result Value Ref Range    Sodium 142 135 - 147 mmol/L    Potassium 4.9 3.5 - 5.3 mmol/L    Chloride 104 96 - 108 mmol/L    CO2 27 21 - 32 mmol/L    ANION GAP 11 4 - 13 mmol/L    BUN 21 5 - 25 mg/dL    Creatinine 1.35 (H) 0.60 - 1.30 mg/dL    Glucose, Fasting 127 (H) 65 - 99 mg/dL    Calcium 9.5 8.4 - 10.2 mg/dL    eGFR 52 ml/min/1.73sq m   CBC and differential   Result Value Ref Range    WBC 12.28 (H) 4.31 - 10.16 Thousand/uL    RBC 5.23 3.88 - 5.62 Million/uL    Hemoglobin 16.9 12.0 - 17.0 g/dL    Hematocrit 52.3 (H) 36.5 - 49.3 %     (H) 82 - 98 fL    MCH 32.3 26.8 - 34.3 pg    MCHC 32.3 31.4 - 37.4 g/dL    RDW 13.9 11.6 - 15.1 %    MPV 10.0 8.9 - 12.7 fL    Platelets 486 (H) 149 - 390 Thousands/uL    nRBC 0 /100 WBCs    Segmented % 50 43 - 75 %    Immature Grans % 0 0 - 2 %    Lymphocytes % 30 14 - 44 %    Monocytes % 15 (H) 4 - 12 %    Eosinophils Relative 4 0 - 6 %    Basophils Relative 1 0 - 1 %    Absolute Neutrophils 6.08 1.85 - 7.62 Thousands/µL    Absolute Immature Grans 0.05 0.00 - 0.20 Thousand/uL    Absolute Lymphocytes 3.71 0.60 - 4.47 Thousands/µL    Absolute Monocytes 1.85 (H) 0.17 - 1.22 Thousand/µL    Eosinophils Absolute 0.49 0.00 - 0.61 Thousand/µL    Basophils Absolute 0.10 0.00 - 0.10 Thousands/µL   Phosphorus   Result Value Ref Range    Phosphorus  3.1 2.3 - 4.1 mg/dL   Protein / creatinine ratio, urine   Result Value Ref Range    Creatinine, Ur 163.0 Reference range not established. mg/dL    Protein Urine Random 11 Reference range not established. mg/dL    Prot/Creat Ratio, Ur 0.07 0.00 - 0.10   PTH, intact   Result Value Ref Range    PTH 36.1 12.0 - 88.0 pg/mL   UA (URINE) with reflex to Scope   Result Value Ref Range    Color, UA Light Yellow     Clarity, UA Clear     Specific Gravity, UA 1.020 1.003 - 1.030    pH, UA 5.5 4.5, 5.0, 5.5, 6.0, 6.5, 7.0, 7.5, 8.0    Leukocytes, UA Negative Negative    Nitrite, UA Negative Negative    Protein, UA Trace (A) Negative mg/dl    Glucose, UA Negative Negative mg/dl    Ketones, UA Negative Negative mg/dl    Urobilinogen, UA <2.0 <2.0 mg/dl mg/dl    Bilirubin, UA Negative Negative    Occult Blood, UA Negative Negative   Vitamin D 25 hydroxy   Result Value Ref Range    Vit D, 25-Hydroxy 35.9 30.0 - 100.0 ng/mL   Urine Microscopic   Result Value Ref Range    RBC, UA None Seen None Seen, 1-2 /hpf    WBC, UA None Seen None Seen, 1-2 /hpf    Epithelial Cells Occasional None Seen, Occasional /hpf    Bacteria, UA None Seen None Seen, Occasional /hpf       ASSESSMENT and PLAN:      Stage 3a chronic kidney disease (HCC)  Lab Results   Component Value Date    EGFR 52 07/24/2024    EGFR 63 01/29/2024    EGFR 60 01/05/2024    CREATININE 1.35 (H) 07/24/2024    CREATININE 1.23 01/29/2024    CREATININE 1.21 01/05/2024   Overall stable with some fluctuation.  Advise hydration and avoiding nephrotoxic medicine    Chronic kidney disease-mineral and bone disorder  Lab Results   Component Value Date    EGFR 52 07/24/2024    EGFR 63 01/29/2024    EGFR 60 01/05/2024    CREATININE 1.35 (H) 07/24/2024    CREATININE 1.23 01/29/2024    CREATININE 1.21 01/05/2024   PTH and phosphorus along with vitamin D are within acceptable range and will continue to monitor    CAD (coronary artery disease)  Asymptomatic for now    Acute on chronic diastolic  "(congestive) heart failure (HCC)  Wt Readings from Last 3 Encounters:   07/29/24 98.4 kg (217 lb)   07/25/24 97.7 kg (215 lb 4.8 oz)   07/18/24 97.9 kg (215 lb 13.3 oz)     Very asymptomatic and quite stable          Everything discussed with patient at length.  I will see him back in 1 year and we will get blood and urine test before that visit        Portions of the record may have been created with voice recognition software. Occasional wrong word or \"sound a like\" substitutions may have occurred due to the inherent limitations of voice recognition software. Read the chart carefully and recognize, using context, where substitutions have occurred.If you have any questions, please contact the dictating provider.   "

## 2024-07-29 NOTE — ASSESSMENT & PLAN NOTE
Wt Readings from Last 3 Encounters:   07/29/24 98.4 kg (217 lb)   07/25/24 97.7 kg (215 lb 4.8 oz)   07/18/24 97.9 kg (215 lb 13.3 oz)     Very asymptomatic and quite stable

## 2024-07-29 NOTE — ASSESSMENT & PLAN NOTE
Lab Results   Component Value Date    EGFR 52 07/24/2024    EGFR 63 01/29/2024    EGFR 60 01/05/2024    CREATININE 1.35 (H) 07/24/2024    CREATININE 1.23 01/29/2024    CREATININE 1.21 01/05/2024   Overall stable with some fluctuation.  Advise hydration and avoiding nephrotoxic medicine

## 2024-07-29 NOTE — ASSESSMENT & PLAN NOTE
Lab Results   Component Value Date    EGFR 52 07/24/2024    EGFR 63 01/29/2024    EGFR 60 01/05/2024    CREATININE 1.35 (H) 07/24/2024    CREATININE 1.23 01/29/2024    CREATININE 1.21 01/05/2024   PTH and phosphorus along with vitamin D are within acceptable range and will continue to monitor

## 2024-07-31 ENCOUNTER — TELEPHONE (OUTPATIENT)
Dept: NEPHROLOGY | Facility: CLINIC | Age: 69
End: 2024-07-31

## 2024-07-31 NOTE — TELEPHONE ENCOUNTER
I called and left a message on machine for patient to return our call about scheduling his 12 month nephrology follow up appointment with Dr. NAMITA Wilkinson from the recall list for on or after 7/29/2025.

## 2024-08-09 ENCOUNTER — HOSPITAL ENCOUNTER (OUTPATIENT)
Dept: NON INVASIVE DIAGNOSTICS | Facility: CLINIC | Age: 69
Discharge: HOME | End: 2024-08-09
Payer: COMMERCIAL

## 2024-08-09 VITALS
BODY MASS INDEX: 31.07 KG/M2 | HEIGHT: 70 IN | WEIGHT: 217 LBS | SYSTOLIC BLOOD PRESSURE: 130 MMHG | HEART RATE: 63 BPM | DIASTOLIC BLOOD PRESSURE: 80 MMHG

## 2024-08-09 DIAGNOSIS — D21.4 GIST (GASTROINTESTINAL STROMAL TUMOR), NON-MALIGNANT: ICD-10-CM

## 2024-08-09 LAB
AORTIC ROOT: 3.4 CM
APICAL FOUR CHAMBER EJECTION FRACTION: 57 %
BSA FOR ECHO PROCEDURE: 2.16 M2
E WAVE DECELERATION TIME: 179 MS
E/A RATIO: 0.91
FRACTIONAL SHORTENING: 29 (ref 28–44)
INTERVENTRICULAR SEPTUM IN DIASTOLE (PARASTERNAL SHORT AXIS VIEW): 1 CM
INTERVENTRICULAR SEPTUM: 1 CM (ref 0.6–1.1)
LEFT ATRIUM SIZE: 3.9 CM
LEFT INTERNAL DIMENSION IN SYSTOLE: 4.1 CM (ref 2.1–4)
LEFT VENTRICLE DIASTOLIC VOLUME (MOD BIPLANE): 129 ML
LEFT VENTRICLE DIASTOLIC VOLUME INDEX (MOD BIPLANE): 59.7 ML/M2
LEFT VENTRICLE SYSTOLIC VOLUME (MOD BIPLANE): 54 ML
LEFT VENTRICLE SYSTOLIC VOLUME INDEX (MOD BIPLANE): 25 ML/M2
LEFT VENTRICULAR INTERNAL DIMENSION IN DIASTOLE: 5.8 CM (ref 3.5–6)
LEFT VENTRICULAR POSTERIOR WALL IN END DIASTOLE: 1.1 CM
LEFT VENTRICULAR STROKE VOLUME: 92 ML
LV EF: 58 %
LVSV (TEICH): 92 ML
MV E'TISSUE VEL-SEP: 10 CM/S
MV PEAK A VEL: 0.88 M/S
MV PEAK E VEL: 80 CM/S
MV STENOSIS PRESSURE HALF TIME: 52 MS
MV VALVE AREA P 1/2 METHOD: 4.23
SL CV LV EF: 60
SL CV PED ECHO LEFT VENTRICLE DIASTOLIC VOLUME (MOD BIPLANE) 2D: 165 ML
SL CV PED ECHO LEFT VENTRICLE SYSTOLIC VOLUME (MOD BIPLANE) 2D: 73 ML

## 2024-08-09 PROCEDURE — 93308 TTE F-UP OR LMTD: CPT | Performed by: INTERNAL MEDICINE

## 2024-08-09 PROCEDURE — 93321 DOPPLER ECHO F-UP/LMTD STD: CPT | Performed by: INTERNAL MEDICINE

## 2024-08-09 PROCEDURE — 93325 DOPPLER ECHO COLOR FLOW MAPG: CPT

## 2024-08-09 PROCEDURE — 93308 TTE F-UP OR LMTD: CPT

## 2024-08-09 PROCEDURE — 93321 DOPPLER ECHO F-UP/LMTD STD: CPT

## 2024-08-09 PROCEDURE — 93325 DOPPLER ECHO COLOR FLOW MAPG: CPT | Performed by: INTERNAL MEDICINE

## 2024-08-09 PROCEDURE — 93356 MYOCRD STRAIN IMG SPCKL TRCK: CPT | Performed by: INTERNAL MEDICINE

## 2024-09-12 ENCOUNTER — RA CDI HCC (OUTPATIENT)
Dept: OTHER | Facility: HOSPITAL | Age: 69
End: 2024-09-12

## 2024-09-12 NOTE — PROGRESS NOTES
HCC coding opportunities          Chart Reviewed number of suggestions sent to Provider: 1     Patients Insurance     Medicare Insurance: [employer]age

## 2024-09-19 ENCOUNTER — OFFICE VISIT (OUTPATIENT)
Dept: FAMILY MEDICINE CLINIC | Facility: CLINIC | Age: 69
End: 2024-09-19
Payer: COMMERCIAL

## 2024-09-19 VITALS
DIASTOLIC BLOOD PRESSURE: 76 MMHG | TEMPERATURE: 97.7 F | SYSTOLIC BLOOD PRESSURE: 128 MMHG | HEIGHT: 70 IN | WEIGHT: 218.8 LBS | HEART RATE: 71 BPM | OXYGEN SATURATION: 97 % | BODY MASS INDEX: 31.32 KG/M2

## 2024-09-19 DIAGNOSIS — Z12.11 SCREENING FOR COLON CANCER: ICD-10-CM

## 2024-09-19 DIAGNOSIS — F17.211 CIGARETTE NICOTINE DEPENDENCE IN REMISSION: ICD-10-CM

## 2024-09-19 DIAGNOSIS — R73.03 PREDIABETES: ICD-10-CM

## 2024-09-19 DIAGNOSIS — I25.118 CORONARY ARTERY DISEASE INVOLVING NATIVE HEART WITH OTHER FORM OF ANGINA PECTORIS, UNSPECIFIED VESSEL OR LESION TYPE (HCC): ICD-10-CM

## 2024-09-19 DIAGNOSIS — Z23 ENCOUNTER FOR IMMUNIZATION: ICD-10-CM

## 2024-09-19 DIAGNOSIS — I73.9 CLAUDICATION (HCC): ICD-10-CM

## 2024-09-19 DIAGNOSIS — Z00.00 ENCOUNTER FOR ANNUAL WELLNESS VISIT (AWV) IN MEDICARE PATIENT: Primary | ICD-10-CM

## 2024-09-19 DIAGNOSIS — E55.9 VITAMIN D DEFICIENCY: ICD-10-CM

## 2024-09-19 DIAGNOSIS — Z12.5 SCREENING FOR PROSTATE CANCER: ICD-10-CM

## 2024-09-19 DIAGNOSIS — E78.2 MIXED HYPERLIPIDEMIA: ICD-10-CM

## 2024-09-19 DIAGNOSIS — I10 PRIMARY HYPERTENSION: ICD-10-CM

## 2024-09-19 PROCEDURE — 90662 IIV NO PRSV INCREASED AG IM: CPT | Performed by: NURSE PRACTITIONER

## 2024-09-19 PROCEDURE — G0439 PPPS, SUBSEQ VISIT: HCPCS | Performed by: NURSE PRACTITIONER

## 2024-09-19 PROCEDURE — 99214 OFFICE O/P EST MOD 30 MIN: CPT | Performed by: NURSE PRACTITIONER

## 2024-09-19 PROCEDURE — G0008 ADMIN INFLUENZA VIRUS VAC: HCPCS | Performed by: NURSE PRACTITIONER

## 2024-09-19 NOTE — ASSESSMENT & PLAN NOTE
Denies symptoms related to elevated blood sugars.  To obtain blood work as ordered.    Orders:    TSH, 3rd generation with Free T4 reflex; Future    Hemoglobin A1C; Future    Comprehensive metabolic panel; Future    CBC and differential; Future

## 2024-09-19 NOTE — ASSESSMENT & PLAN NOTE
Currently on atorvastatin 40 mg.  To obtain blood work as ordered.    Orders:    Comprehensive metabolic panel; Future    Lipid panel; Future

## 2024-09-19 NOTE — PATIENT INSTRUCTIONS
Medicare Preventive Visit Patient Instructions  Thank you for completing your Welcome to Medicare Visit or Medicare Annual Wellness Visit today. Your next wellness visit will be due in one year (9/20/2025).  The screening/preventive services that you may require over the next 5-10 years are detailed below. Some tests may not apply to you based off risk factors and/or age. Screening tests ordered at today's visit but not completed yet may show as past due. Also, please note that scanned in results may not display below.  Preventive Screenings:  Service Recommendations Previous Testing/Comments   Colorectal Cancer Screening  Colonoscopy    Fecal Occult Blood Test (FOBT)/Fecal Immunochemical Test (FIT)  Fecal DNA/Cologuard Test  Flexible Sigmoidoscopy Age: 45-75 years old   Colonoscopy: every 10 years (May be performed more frequently if at higher risk)  OR  FOBT/FIT: every 1 year  OR  Cologuard: every 3 years  OR  Sigmoidoscopy: every 5 years  Screening may be recommended earlier than age 45 if at higher risk for colorectal cancer. Also, an individualized decision between you and your healthcare provider will decide whether screening between the ages of 76-85 would be appropriate. Colonoscopy: Not on file  FOBT/FIT: Not on file  Cologuard: Not on file  Sigmoidoscopy: Not on file          Prostate Cancer Screening Individualized decision between patient and health care provider in men between ages of 55-69   Medicare will cover every 12 months beginning on the day after your 50th birthday PSA: No results in last 5 years           Hepatitis C Screening Once for adults born between 1945 and 1965  More frequently in patients at high risk for Hepatitis C Hep C Antibody: 05/18/2019    Screening Current   Diabetes Screening 1-2 times per year if you're at risk for diabetes or have pre-diabetes Fasting glucose: 127 mg/dL (7/24/2024)  A1C: 6.1 % (11/1/2022)  Screening Current   Cholesterol Screening Once every 5 years if you  don't have a lipid disorder. May order more often based on risk factors. Lipid panel: 11/01/2022  Screening Not Indicated  History Lipid Disorder      Other Preventive Screenings Covered by Medicare:  Abdominal Aortic Aneurysm (AAA) Screening: covered once if your at risk. You're considered to be at risk if you have a family history of AAA or a male between the age of 65-75 who smoking at least 100 cigarettes in your lifetime.  Lung Cancer Screening: covers low dose CT scan once per year if you meet all of the following conditions: (1) Age 55-77; (2) No signs or symptoms of lung cancer; (3) Current smoker or have quit smoking within the last 15 years; (4) You have a tobacco smoking history of at least 20 pack years (packs per day x number of years you smoked); (5) You get a written order from a healthcare provider.  Glaucoma Screening: covered annually if you're considered high risk: (1) You have diabetes OR (2) Family history of glaucoma OR (3)  aged 50 and older OR (4)  American aged 65 and older  Osteoporosis Screening: covered every 2 years if you meet one of the following conditions: (1) Have a vertebral abnormality; (2) On glucocorticoid therapy for more than 3 months; (3) Have primary hyperparathyroidism; (4) On osteoporosis medications and need to assess response to drug therapy.  HIV Screening: covered annually if you're between the age of 15-65. Also covered annually if you are younger than 15 and older than 65 with risk factors for HIV infection. For pregnant patients, it is covered up to 3 times per pregnancy.    Immunizations:  Immunization Recommendations   Influenza Vaccine Annual influenza vaccination during flu season is recommended for all persons aged >= 6 months who do not have contraindications   Pneumococcal Vaccine   * Pneumococcal conjugate vaccine = PCV13 (Prevnar 13), PCV15 (Vaxneuvance), PCV20 (Prevnar 20)  * Pneumococcal polysaccharide vaccine = PPSV23 (Pneumovax)  Adults 19-63 yo with certain risk factors or if 65+ yo  If never received any pneumonia vaccine: recommend Prevnar 20 (PCV20)  Give PCV20 if previously received 1 dose of PCV13 or PPSV23   Hepatitis B Vaccine 3 dose series if at intermediate or high risk (ex: diabetes, end stage renal disease, liver disease)   Respiratory syncytial virus (RSV) Vaccine - COVERED BY MEDICARE PART D  * RSVPreF3 (Arexvy) CDC recommends that adults 60 years of age and older may receive a single dose of RSV vaccine using shared clinical decision-making (SCDM)   Tetanus (Td) Vaccine - COST NOT COVERED BY MEDICARE PART B Following completion of primary series, a booster dose should be given every 10 years to maintain immunity against tetanus. Td may also be given as tetanus wound prophylaxis.   Tdap Vaccine - COST NOT COVERED BY MEDICARE PART B Recommended at least once for all adults. For pregnant patients, recommended with each pregnancy.   Shingles Vaccine (Shingrix) - COST NOT COVERED BY MEDICARE PART B  2 shot series recommended in those 19 years and older who have or will have weakened immune systems or those 50 years and older     Health Maintenance Due:      Topic Date Due   • Colorectal Cancer Screening  Never done   • Lung Cancer Screening  Never done   • Hepatitis C Screening  Completed     Immunizations Due:      Topic Date Due   • HIB Vaccine (1 of 1 - Risk 1-dose series) 10/02/1956   • Meningococcal ACWY Vaccine (1 - Risk start 2-23 months series) 01/13/2022   • COVID-19 Vaccine (5 - 2023-24 season) 09/01/2024   • Influenza Vaccine (1) 09/01/2024     Advance Directives   What are advance directives?  Advance directives are legal documents that state your wishes and plans for medical care. These plans are made ahead of time in case you lose your ability to make decisions for yourself. Advance directives can apply to any medical decision, such as the treatments you want, and if you want to donate organs.   What are the types of  advance directives?  There are many types of advance directives, and each state has rules about how to use them. You may choose a combination of any of the following:  Living will:  This is a written record of the treatment you want. You can also choose which treatments you do not want, which to limit, and which to stop at a certain time. This includes surgery, medicine, IV fluid, and tube feedings.   Durable power of  for healthcare (DPAHC):  This is a written record that states who you want to make healthcare choices for you when you are unable to make them for yourself. This person, called a proxy, is usually a family member or a friend. You may choose more than 1 proxy.  Do not resuscitate (DNR) order:  A DNR order is used in case your heart stops beating or you stop breathing. It is a request not to have certain forms of treatment, such as CPR. A DNR order may be included in other types of advance directives.  Medical directive:  This covers the care that you want if you are in a coma, near death, or unable to make decisions for yourself. You can list the treatments you want for each condition. Treatment may include pain medicine, surgery, blood transfusions, dialysis, IV or tube feedings, and a ventilator (breathing machine).  Values history:  This document has questions about your views, beliefs, and how you feel and think about life. This information can help others choose the care that you would choose.  Why are advance directives important?  An advance directive helps you control your care. Although spoken wishes may be used, it is better to have your wishes written down. Spoken wishes can be misunderstood, or not followed. Treatments may be given even if you do not want them. An advance directive may make it easier for your family to make difficult choices about your care.   Weight Management   Why it is important to manage your weight:  Being overweight increases your risk of health conditions  such as heart disease, high blood pressure, type 2 diabetes, and certain types of cancer. It can also increase your risk for osteoarthritis, sleep apnea, and other respiratory problems. Aim for a slow, steady weight loss. Even a small amount of weight loss can lower your risk of health problems.  How to lose weight safely:  A safe and healthy way to lose weight is to eat fewer calories and get regular exercise. You can lose up about 1 pound a week by decreasing the number of calories you eat by 500 calories each day.   Healthy meal plan for weight management:  A healthy meal plan includes a variety of foods, contains fewer calories, and helps you stay healthy. A healthy meal plan includes the following:  Eat whole-grain foods more often.  A healthy meal plan should contain fiber. Fiber is the part of grains, fruits, and vegetables that is not broken down by your body. Whole-grain foods are healthy and provide extra fiber in your diet. Some examples of whole-grain foods are whole-wheat breads and pastas, oatmeal, brown rice, and bulgur.  Eat a variety of vegetables every day.  Include dark, leafy greens such as spinach, kale, caity greens, and mustard greens. Eat yellow and orange vegetables such as carrots, sweet potatoes, and winter squash.   Eat a variety of fruits every day.  Choose fresh or canned fruit (canned in its own juice or light syrup) instead of juice. Fruit juice has very little or no fiber.  Eat low-fat dairy foods.  Drink fat-free (skim) milk or 1% milk. Eat fat-free yogurt and low-fat cottage cheese. Try low-fat cheeses such as mozzarella and other reduced-fat cheeses.  Choose meat and other protein foods that are low in fat.  Choose beans or other legumes such as split peas or lentils. Choose fish, skinless poultry (chicken or turkey), or lean cuts of red meat (beef or pork). Before you cook meat or poultry, cut off any visible fat.   Use less fat and oil.  Try baking foods instead of frying  them. Add less fat, such as margarine, sour cream, regular salad dressing and mayonnaise to foods. Eat fewer high-fat foods. Some examples of high-fat foods include french fries, doughnuts, ice cream, and cakes.  Eat fewer sweets.  Limit foods and drinks that are high in sugar. This includes candy, cookies, regular soda, and sweetened drinks.  Exercise:  Exercise at least 30 minutes per day on most days of the week. Some examples of exercise include walking, biking, dancing, and swimming. You can also fit in more physical activity by taking the stairs instead of the elevator or parking farther away from stores. Ask your healthcare provider about the best exercise plan for you.      © Copyright [employer] 2018 Information is for End User's use only and may not be sold, redistributed or otherwise used for commercial purposes. All illustrations and images included in CareNotes® are the copyrighted property of [employer]M., Inc[employer] or [employer]

## 2024-09-19 NOTE — ASSESSMENT & PLAN NOTE
Denies exacerbation of symptoms.  To continue on statin as prescribed.  Continue follow-up with cardiology as scheduled.

## 2024-09-19 NOTE — ASSESSMENT & PLAN NOTE
Denies chest pain, shortness of breath, palpitations.  Advised to call cardiology for follow-up appointment.

## 2024-09-19 NOTE — ASSESSMENT & PLAN NOTE
Blood pressure controlled in office today.  To continue on metoprolol as prescribed.  To follow-up with cardiology as scheduled.  To obtain blood work as ordered    Orders:    TSH, 3rd generation with Free T4 reflex; Future    Hemoglobin A1C; Future    Comprehensive metabolic panel; Future    CBC and differential; Future    Lipid panel; Future    Albumin / creatinine urine ratio; Future

## 2024-09-19 NOTE — PROGRESS NOTES
Ambulatory Visit  Name: Ladarius Tabares      : 1955      MRN: [id number]  Encounter Provider: FRANCK Hawkins  Encounter Date: 2024   Encounter department: [facility] [address]    Assessment & Plan  Encounter for annual wellness visit (AWV) in Medicare patient         Primary hypertension  Blood pressure controlled in office today.  To continue on metoprolol as prescribed.  To follow-up with cardiology as scheduled.  To obtain blood work as ordered    Orders:    TSH, 3rd generation with Free T4 reflex; Future    Hemoglobin A1C; Future    Comprehensive metabolic panel; Future    CBC and differential; Future    Lipid panel; Future    Albumin / creatinine urine ratio; Future    Coronary artery disease involving native heart with other form of angina pectoris, unspecified vessel or lesion type (HCC)  Denies chest pain, shortness of breath, palpitations.  Advised to call cardiology for follow-up appointment.       Mixed hyperlipidemia  Currently on atorvastatin 40 mg.  To obtain blood work as ordered.    Orders:    Comprehensive metabolic panel; Future    Lipid panel; Future    Prediabetes  Denies symptoms related to elevated blood sugars.  To obtain blood work as ordered.    Orders:    TSH, 3rd generation with Free T4 reflex; Future    Hemoglobin A1C; Future    Comprehensive metabolic panel; Future    CBC and differential; Future    Vitamin D deficiency    Orders:    Vitamin D 25 hydroxy; Future    Claudication (HCC)  Denies exacerbation of symptoms.  To continue on statin as prescribed.  Continue follow-up with cardiology as scheduled.       Screening for colon cancer    Orders:    Ambulatory Referral to Gastroenterology; Future    Screening for prostate cancer    Orders:    PSA, Total Screen; Future    Cigarette nicotine dependence in remission    Orders:    CT lung screening program; Future    Encounter for immunization    Orders:    influenza vaccine,  high-dose, PF 0.5 mL (Fluzone High Dose)      Depression Screening and Follow-up Plan: Patient was screened for depression during today's encounter. They screened negative with a PHQ-2 score of 0.      Preventive health issues were discussed with patient, and age appropriate screening tests were ordered as noted in patient's After Visit Summary. Personalized health advice and appropriate referrals for health education or preventive services given if needed, as noted in patient's After Visit Summary.    History of Present Illness     Patient presents the office for annual Medicare wellness visit.  Patient continues to follow with [facility] oncology team, nephrology, ENT, cardiology.  Patient notes compliance with daily medications.  Denies complaints related to chronic issues.  Patient denies chest pain, palpitations, shortness of breath, weakness.       Patient Care Team:  FRANCK Hawkins as PCP - General (Family Medicine)  Urbano Wilkinson MD (Nephrology)    Review of Systems   Constitutional:  Negative for activity change, appetite change, chills, fatigue, fever and unexpected weight change.   HENT:  Negative for ear pain and sore throat.    Eyes:  Negative for photophobia and visual disturbance.   Respiratory:  Negative for cough, chest tightness and shortness of breath.    Cardiovascular:  Negative for chest pain, palpitations and leg swelling.   Gastrointestinal:  Negative for abdominal pain, blood in stool, constipation, diarrhea, nausea and vomiting.   Endocrine: Negative for polydipsia, polyphagia and polyuria.   Genitourinary:  Negative for decreased urine volume, dysuria, frequency, hematuria, testicular pain and urgency.   Musculoskeletal:  Negative for arthralgias, back pain and myalgias.   Skin:  Negative for color change and rash.   Neurological:  Negative for dizziness, syncope, weakness, light-headedness, numbness and headaches.   Hematological:  Negative for adenopathy. Does not bruise/bleed  easily.   Psychiatric/Behavioral:  Negative for agitation, confusion, dysphoric mood and sleep disturbance. The patient is not nervous/anxious.      Medical History Reviewed by provider this encounter:  Tobacco  Allergies  Meds  Med Hx  Surg Hx  Fam Hx  Soc Hx      Annual Wellness Visit Questionnaire   Ladarius is here for his Subsequent Wellness visit.     Health Risk Assessment:   Patient rates overall health as good. Patient feels that their physical health rating is slightly better. Patient is satisfied with their life. Eyesight was rated as slightly worse. Hearing was rated as same. Patient feels that their emotional and mental health rating is same. Patients states they are never, rarely angry. Patient states they are sometimes unusually tired/fatigued. Pain experienced in the last 7 days has been some. Patient's pain rating has been 4/10. Patient states that he has experienced no weight loss or gain in last 6 months.     Depression Screening:   PHQ-2 Score: 0      Fall Risk Screening:   In the past year, patient has experienced: no history of falling in past year      Home Safety:  Patient does not have trouble with stairs inside or outside of their home. Patient has working smoke alarms and has working carbon monoxide detector. Home safety hazards include: none.     Nutrition:   Current diet is Regular.     Medications:   Patient is not currently taking any over-the-counter supplements. Patient is able to manage medications.     Activities of Daily Living (ADLs)/Instrumental Activities of Daily Living (IADLs):   Walk and transfer into and out of bed and chair?: Yes  Dress and groom yourself?: Yes    Bathe or shower yourself?: Yes    Feed yourself? Yes  Do your laundry/housekeeping?: Yes  Manage your money, pay your bills and track your expenses?: Yes  Make your own meals?: Yes    Do your own shopping?: Yes    ADL comments: Lives in 2-story home with daughters, wife, grandchild    Previous  Hospitalizations:   Any hospitalizations or ED visits within the last 12 months?: No      Advance Care Planning:   Living will: No    Durable POA for healthcare: No    Advanced directive: No      Cognitive Screening:   Provider or family/friend/caregiver concerned regarding cognition?: No    PREVENTIVE SCREENINGS      Cardiovascular Screening:    General: Screening Not Indicated and History Lipid Disorder    Due for: Lipid Panel      Diabetes Screening:     General: Screening Current      Prostate Cancer Screening:      Due for: PSA      Osteoporosis Screening:    General: Screening Not Indicated      Abdominal Aortic Aneurysm (AAA) Screening:    Risk factors include: age between 65-74 yo and tobacco use        Lung Cancer Screening:       Due for: Low Dose CT (LDCT)      Hepatitis C Screening:    General: Screening Current    Screening, Brief Intervention, and Referral to Treatment (SBIRT)    Screening  Typical number of drinks in a day: 0  Typical number of drinks in a week: 0  Interpretation: Low risk drinking behavior.    AUDIT-C Screenin) How often did you have a drink containing alcohol in the past year? never  2) How many drinks did you have on a typical day when you were drinking in the past year? 0  3) How often did you have 6 or more drinks on one occasion in the past year? never    AUDIT-C Score: 0  Interpretation: Score 0-3 (male): Negative screen for alcohol misuse    Single Item Drug Screening:  How often have you used an illegal drug (including marijuana) or a prescription medication for non-medical reasons in the past year? never    Single Item Drug Screen Score: 0  Interpretation: Negative screen for possible drug use disorder    Other Counseling Topics:   Car/seat belt/driving safety, skin self-exam, sunscreen and regular weightbearing exercise and calcium and vitamin D intake.     Social Determinants of Health     Financial Resource Strain: Low Risk  (11/10/2022)    Overall Financial Resource  "Strain (CARDIA)     Difficulty of Paying Living Expenses: Not very hard   Food Insecurity: No Food Insecurity (9/12/2024)    Hunger Vital Sign     Worried About Running Out of Food in the Last Year: Never true     Ran Out of Food in the Last Year: Never true   Transportation Needs: No Transportation Needs (9/12/2024)    PRAPARE - Transportation     Lack of Transportation (Medical): No     Lack of Transportation (Non-Medical): No   Housing Stability: Low Risk  (9/12/2024)    Housing Stability Vital Sign     Unable to Pay for Housing in the Last Year: No     Number of Times Moved in the Last Year: 0     Homeless in the Last Year: No   Utilities: Not At Risk (9/12/2024)    [facility] Utilities     Threatened with loss of utilities: No     No results found.    Objective     /76 (BP Location: Left arm, Patient Position: Sitting)   Pulse 71   Temp 97.7 °F (36.5 °C)   Ht 5' 10\" (1.778 m)   Wt 99.2 kg (218 lb 12.8 oz)   SpO2 97%   BMI 31.39 kg/m²     Physical Exam  Vitals reviewed.   Constitutional:       General: He is not in acute distress.     Appearance: Normal appearance. He is well-developed. He is not ill-appearing.   HENT:      Head: Normocephalic and atraumatic.      Right Ear: Tympanic membrane, ear canal and external ear normal.      Left Ear: Tympanic membrane, ear canal and external ear normal.      Nose: Nose normal.      Mouth/Throat:      Mouth: Mucous membranes are moist.      Pharynx: Oropharynx is clear.   Eyes:      Extraocular Movements: Extraocular movements intact.      Conjunctiva/sclera: Conjunctivae normal.      Pupils: Pupils are equal, round, and reactive to light.   Neck:      Vascular: No carotid bruit.   Cardiovascular:      Rate and Rhythm: Normal rate and regular rhythm.      Pulses: Normal pulses.      Heart sounds: Normal heart sounds. No murmur heard.  Pulmonary:      Effort: Pulmonary effort is normal. No respiratory distress.      Breath sounds: Normal breath sounds.   Abdominal: "      General: Bowel sounds are normal.      Palpations: Abdomen is soft.      Tenderness: There is no abdominal tenderness. There is no right CVA tenderness or left CVA tenderness.   Musculoskeletal:         General: No swelling. Normal range of motion.      Cervical back: Normal range of motion and neck supple.      Right lower leg: No edema.      Left lower leg: No edema.   Lymphadenopathy:      Cervical: No cervical adenopathy.   Skin:     General: Skin is warm and dry.      Capillary Refill: Capillary refill takes less than 2 seconds.   Neurological:      General: No focal deficit present.      Mental Status: He is alert and oriented to person, place, and time.   Psychiatric:         Mood and Affect: Mood normal.         Behavior: Behavior normal.

## 2024-10-08 ENCOUNTER — TELEPHONE (OUTPATIENT)
Age: 69
End: 2024-10-08

## 2024-10-08 ENCOUNTER — PREP FOR PROCEDURE (OUTPATIENT)
Age: 69
End: 2024-10-08

## 2024-10-08 DIAGNOSIS — Z12.11 SCREENING FOR COLON CANCER: Primary | ICD-10-CM

## 2024-10-08 NOTE — TELEPHONE ENCOUNTER
Scheduled date of colonoscopy (as of today): 10-  Physician performing colonoscopy: Dr. Marie  Location of colonoscopy: [facility]  Bowel prep reviewed with patient: miralax dulcolax reviewed and sent via [phone]   Instructions reviewed with patient by: jagdeep   Clearances: n/a

## 2024-10-08 NOTE — TELEPHONE ENCOUNTER
10/08/24  Screened by: Tarah Moss MA    Referring Provider Dr. Sherin Perez    Pre- Screening:     There is no height or weight on file to calculate BMI.  Has patient been referred for a routine screening Colonoscopy? yes  Is the patient between 45-75 years old? yes      Previous Colonoscopy no   If yes:    Date:     Facility:     Reason:       SCHEDULING STAFF: If the patient is between 45yrs-49yrs, please advise patient to confirm benefits/coverage with their insurance company for a routine screening colonoscopy, some insurance carriers will only cover at 50yrs or older. If the patient is over 75years old, please schedule an office visit.     Does the patient want to see a Gastroenterologist prior to their procedure OR are they having any GI symptoms? no    Has the patient been hospitalized or had abdominal surgery in the past 6 months? no    Does the patient use supplemental oxygen? no    Does the patient take Coumadin, Lovenox, Plavix, Elliquis, Xarelto, or other blood thinning medication? no    Has the patient had a stroke, cardiac event, or stent placed in the past year? no    SCHEDULING STAFF: If patient answers NO to above questions, then schedule procedure. If patient answers YES to above questions, then schedule office appointment.     If patient is between 45yrs - 49yrs, please advise patient that we will have to confirm benefits & coverage with their insurance company for a routine screening colonoscopy.

## 2024-10-21 ENCOUNTER — PREP FOR PROCEDURE (OUTPATIENT)
Age: 69
End: 2024-10-21

## 2024-10-22 ENCOUNTER — ANESTHESIA EVENT (OUTPATIENT)
Dept: GASTROENTEROLOGY | Facility: HOSPITAL | Age: 69
End: 2024-10-22
Payer: COMMERCIAL

## 2024-10-22 ENCOUNTER — ANESTHESIA (OUTPATIENT)
Dept: GASTROENTEROLOGY | Facility: HOSPITAL | Age: 69
End: 2024-10-22
Payer: COMMERCIAL

## 2024-10-22 ENCOUNTER — HOSPITAL ENCOUNTER (OUTPATIENT)
Dept: GASTROENTEROLOGY | Facility: HOSPITAL | Age: 69
Setting detail: OUTPATIENT SURGERY
Discharge: HOME | End: 2024-10-22
Attending: INTERNAL MEDICINE
Payer: COMMERCIAL

## 2024-10-22 VITALS
RESPIRATION RATE: 18 BRPM | BODY MASS INDEX: 30.3 KG/M2 | OXYGEN SATURATION: 92 % | HEIGHT: 70 IN | TEMPERATURE: 97.5 F | SYSTOLIC BLOOD PRESSURE: 146 MMHG | HEART RATE: 59 BPM | WEIGHT: 211.64 LBS | DIASTOLIC BLOOD PRESSURE: 100 MMHG

## 2024-10-22 DIAGNOSIS — Z12.11 SCREENING FOR COLON CANCER: ICD-10-CM

## 2024-10-22 PROCEDURE — 88342 IMHCHEM/IMCYTCHM 1ST ANTB: CPT | Performed by: STUDENT IN AN ORGANIZED HEALTH CARE EDUCATION/TRAINING PROGRAM

## 2024-10-22 PROCEDURE — 88341 IMHCHEM/IMCYTCHM EA ADD ANTB: CPT | Performed by: STUDENT IN AN ORGANIZED HEALTH CARE EDUCATION/TRAINING PROGRAM

## 2024-10-22 PROCEDURE — 88305 TISSUE EXAM BY PATHOLOGIST: CPT | Performed by: STUDENT IN AN ORGANIZED HEALTH CARE EDUCATION/TRAINING PROGRAM

## 2024-10-22 RX ORDER — PROPOFOL 10 MG/ML
INJECTION, EMULSION INTRAVENOUS AS NEEDED
Status: DISCONTINUED | OUTPATIENT
Start: 2024-10-22 | End: 2024-10-22

## 2024-10-22 RX ORDER — SODIUM CHLORIDE, SODIUM LACTATE, POTASSIUM CHLORIDE, CALCIUM CHLORIDE 600; 310; 30; 20 MG/100ML; MG/100ML; MG/100ML; MG/100ML
INJECTION, SOLUTION INTRAVENOUS CONTINUOUS PRN
Status: DISCONTINUED | OUTPATIENT
Start: 2024-10-22 | End: 2024-10-22

## 2024-10-22 RX ORDER — PHENYLEPHRINE HCL IN 0.9% NACL 1 MG/10 ML
SYRINGE (ML) INTRAVENOUS AS NEEDED
Status: DISCONTINUED | OUTPATIENT
Start: 2024-10-22 | End: 2024-10-22

## 2024-10-22 RX ADMIN — SODIUM CHLORIDE, SODIUM LACTATE, POTASSIUM CHLORIDE, AND CALCIUM CHLORIDE: .6; .31; .03; .02 INJECTION, SOLUTION INTRAVENOUS at 10:40

## 2024-10-22 RX ADMIN — PROPOFOL 20 MG: 10 INJECTION, EMULSION INTRAVENOUS at 10:55

## 2024-10-22 RX ADMIN — PROPOFOL 100 MG: 10 INJECTION, EMULSION INTRAVENOUS at 10:43

## 2024-10-22 RX ADMIN — PROPOFOL 30 MG: 10 INJECTION, EMULSION INTRAVENOUS at 10:45

## 2024-10-22 RX ADMIN — PROPOFOL 50 MG: 10 INJECTION, EMULSION INTRAVENOUS at 10:49

## 2024-10-22 RX ADMIN — PROPOFOL 20 MG: 10 INJECTION, EMULSION INTRAVENOUS at 10:51

## 2024-10-22 RX ADMIN — PROPOFOL 20 MG: 10 INJECTION, EMULSION INTRAVENOUS at 10:47

## 2024-10-22 RX ADMIN — Medication 100 MCG: at 10:45

## 2024-10-22 NOTE — INTERVAL H&P NOTE
H&P reviewed. After examining the patient I find no changes in the patients condition since the H&P had been written.    Vitals:    10/22/24 0917   BP: 155/82   Pulse: 95   Resp: 20   Temp: 98.4 °F (36.9 °C)   SpO2: 95%

## 2024-10-22 NOTE — H&P
"History and Physical - [facility]  Ladarius Tabares 69 y.o. male MRN: [id number]                  HPI: Ladarius Tabares is a 69 y.o. year old male who presents for initial average risk screening colonoscopy      REVIEW OF SYSTEMS: Per the HPI, and otherwise unremarkable.    Historical Information   Past Medical History:   Diagnosis Date    Cancer (HCC)     abd tumour    Chronic kidney disease     Hyperlipidemia     Hypertension     Myocardial infarction (HCC)      Past Surgical History:   Procedure Laterality Date    ABDOMINAL SURGERY      had a gist   tumour  removed    CORONARY ANGIOPLASTY WITH STENT PLACEMENT  2019    x1 stent    EGD      MN TYMPANOSTOMY GENERAL ANESTHESIA Right 2023    Procedure: MYRINGOTOMY W/ INSERTION VENTILATION TUBE EAR;  Surgeon: Bernardino Farrell MD;  Location: [facility];  Service: ENT    MN TYMPANOSTOMY GENERAL ANESTHESIA Right 2024    Procedure: MYRINGOTOMY W/ INSERTION T-TUBE EAR;  Surgeon: Bernardino Farrell MD;  Location: [facility];  Service: ENT     Social History   Social History     Substance and Sexual Activity   Alcohol Use Never     Social History     Substance and Sexual Activity   Drug Use Never     Social History     Tobacco Use   Smoking Status Former    Current packs/day: 0.00    Average packs/day: 1 pack/day for 44.0 years (44.0 ttl pk-yrs)    Types: Cigarettes    Start date: 6/15/1975    Quit date: 2019    Years since quittin.4    Passive exposure: Current   Smokeless Tobacco Never     Family History   Problem Relation Age of Onset    Alzheimer's disease Father        Meds/Allergies     Not in a hospital admission.    No Known Allergies    Objective     Blood pressure 155/82, pulse 95, temperature 98.4 °F (36.9 °C), temperature source Temporal, resp. rate 20, height 5' 10\" (1.778 m), weight 96 kg (211 lb 10.3 oz), SpO2 95%.      PHYSICAL EXAM    Gen: NAD  CV: RRR  CHEST: Clear  ABD: soft, NT/ND  EXT: no " edema  Neuro: AAO      ASSESSMENT/PLAN:  This is a 69 y.o. year old male here for average risk screening    PLAN:   Procedure: Colonoscopy

## 2024-10-22 NOTE — ANESTHESIA POSTPROCEDURE EVALUATION
Post-Op Assessment Note    CV Status:  Stable    Pain management: adequate       Mental Status:  Alert and awake   Hydration Status:  Euvolemic   PONV Controlled:  Controlled   Airway Patency:  Patent  Two or more mitigation strategies used for obstructive sleep apnea   Post Op Vitals Reviewed: Yes    No anethesia notable event occurred.    Staff: Anesthesiologist           Last Filed PACU Vitals:  Vitals Value Taken Time   Temp 97.5 °F (36.4 °C) 10/22/24 1105   Pulse 59 10/22/24 1124   /100 10/22/24 1124   Resp 18 10/22/24 1124   SpO2 92 % 10/22/24 1124       Modified Herman:  Activity: 2 (10/22/2024 11:24 AM)  Respiration: 2 (10/22/2024 11:24 AM)  Circulation: 2 (10/22/2024 11:24 AM)  Consciousness: 2 (10/22/2024 11:24 AM)  Oxygen Saturation: 2 (10/22/2024 11:24 AM)  Modified Herman Score: 10 (10/22/2024 11:24 AM)        
Post-Op Assessment Note    CV Status:  Stable  Pain Score: 0    Pain management: adequate       Mental Status:  Alert and awake   Hydration Status:  Euvolemic   PONV Controlled:  Controlled   Airway Patency:  Patent  Two or more mitigation strategies used for obstructive sleep apnea. There is a medical reason for not screening for obstructive sleep apnea and/or for not using two or more mitigation strategies   Post Op Vitals Reviewed: Yes    No anethesia notable event occurred.    Staff: CRNA           Last Filed PACU Vitals:  Vitals Value Taken Time   Temp 98 (10/22/2024 11:05 AM)   Pulse 70 (10/22/2024 11:05 AM)   /61 (10/22/2024 11:05 AM)   Resp 15 (10/22/2024 11:05 AM)   SpO2 98 (10/22/2024 11:05 AM)       Modified Herman:  Activity: 2 (10/22/2024 11:05 AM)  Respiration: 2 (10/22/2024 11:05 AM)  Circulation: 2 (10/22/2024 11:05 AM)  Consciousness: 2 (10/22/2024 11:05 AM)  Oxygen Saturation: 2 (10/22/2024 11:05 AM)  Modified Herman Score: 10 (10/22/2024 11:05 AM)        
show

## 2024-10-22 NOTE — ANESTHESIA PREPROCEDURE EVALUATION
Procedure:  COLONOSCOPY    Relevant Problems   CARDIO   (+) Coronary artery disease involving native heart with other form of angina pectoris, unspecified vessel or lesion type (HCC)   (+) Mixed hyperlipidemia   (+) Primary hypertension   (+) ST elevation myocardial infarction involving right coronary artery (HCC)   (+) Systolic congestive heart failure (HCC)      /RENAL   (+) Chronic kidney disease-mineral and bone disorder   (+) Stage 3a chronic kidney disease (HCC)      NEURO/PSYCH   (+) Claudication (HCC)     Limited echocardiogram for LVEF and strain.    Left Ventricle: Left ventricular cavity size is normal. Wall thickness is normal. The left ventricular ejection fraction is 60%. Systolic function is normal. Wall motion is normal.  Global longitudinal strain was -18.5% with decreased strain in basal anterior, basal septal, mid anterior, mid inferior, mid inferolateral and apical inferior segments     Physical Exam    Airway    Mallampati score: III  TM Distance: >3 FB  Neck ROM: full     Dental   No notable dental hx     Cardiovascular  Rhythm: regular, No weak pulses    Pulmonary   No stridor    Other Findings        Anesthesia Plan  ASA Score- 3     Anesthesia Type- IV sedation with anesthesia with ASA Monitors.         Additional Monitors:     Airway Plan:            Plan Factors-    Chart reviewed.   Existing labs reviewed. Patient summary reviewed.          Obstructive sleep apnea risk education given perioperatively.        Induction- intravenous.    Postoperative Plan-         Informed Consent- Anesthetic plan and risks discussed with patient.  I personally reviewed this patient with the CRNA. Discussed and agreed on the Anesthesia Plan with the CRNA..

## 2024-10-30 PROCEDURE — 88341 IMHCHEM/IMCYTCHM EA ADD ANTB: CPT | Performed by: STUDENT IN AN ORGANIZED HEALTH CARE EDUCATION/TRAINING PROGRAM

## 2024-10-30 PROCEDURE — 88305 TISSUE EXAM BY PATHOLOGIST: CPT | Performed by: STUDENT IN AN ORGANIZED HEALTH CARE EDUCATION/TRAINING PROGRAM

## 2024-10-30 PROCEDURE — 88342 IMHCHEM/IMCYTCHM 1ST ANTB: CPT | Performed by: STUDENT IN AN ORGANIZED HEALTH CARE EDUCATION/TRAINING PROGRAM

## 2024-10-31 ENCOUNTER — OFFICE VISIT (OUTPATIENT)
Dept: OTOLARYNGOLOGY | Facility: CLINIC | Age: 69
End: 2024-10-31
Payer: COMMERCIAL

## 2024-10-31 VITALS — HEIGHT: 70 IN | WEIGHT: 211 LBS | TEMPERATURE: 97.6 F | BODY MASS INDEX: 30.21 KG/M2

## 2024-10-31 DIAGNOSIS — Z96.22 HISTORY OF TYMPANOSTOMY TUBE PLACEMENT: Primary | ICD-10-CM

## 2024-10-31 DIAGNOSIS — H69.91 DYSFUNCTION OF RIGHT EUSTACHIAN TUBE: ICD-10-CM

## 2024-10-31 PROCEDURE — 99213 OFFICE O/P EST LOW 20 MIN: CPT | Performed by: OTOLARYNGOLOGY

## 2024-10-31 NOTE — PROGRESS NOTES
"Assessment/Plan:  Right T-tube in place and patent.  F/u in 6 months.      Diagnosis ICD-10-CM Associated Orders   1. History of tympanostomy tube placement  Z96.22       2. Dysfunction of right eustachian tube  H69.91              Subjective:      Patient ID: Ladarius Tabares is a 69 y.o. male.    F/u for right M&T.  No c/o.        The following portions of the patient's history were reviewed and updated as appropriate: allergies, current medications, past family history, past medical history, past social history, past surgical history and problem list.    Review of Systems      Objective:      Temp 97.6 °F (36.4 °C) (Temporal)   Ht 5' 10\" (1.778 m)   Wt 95.7 kg (211 lb)   BMI 30.28 kg/m²          Physical Exam  Constitutional:       Appearance: He is well-developed.   HENT:      Head: Normocephalic and atraumatic.      Right Ear: Ear canal and external ear normal. No drainage. No middle ear effusion. A PE tube is present.      Left Ear: Tympanic membrane, ear canal and external ear normal. No drainage.  No middle ear effusion.      Nose: Nose normal.      Mouth/Throat:      Pharynx: Uvula midline. No oropharyngeal exudate.      Tonsils: 1+ on the right. 1+ on the left.   Neck:      Thyroid: No thyroid mass or thyromegaly.      Trachea: Trachea normal. No tracheal deviation.   Lymphadenopathy:      Cervical: No cervical adenopathy.   Neurological:      Mental Status: He is alert.         "

## 2024-11-06 ENCOUNTER — HOSPITAL ENCOUNTER (OUTPATIENT)
Dept: CT IMAGING | Facility: HOSPITAL | Age: 69
Discharge: HOME | End: 2024-11-06

## 2024-11-06 DIAGNOSIS — F17.211 CIGARETTE NICOTINE DEPENDENCE IN REMISSION: ICD-10-CM

## 2024-11-25 DIAGNOSIS — I21.11 ST ELEVATION MYOCARDIAL INFARCTION INVOLVING RIGHT CORONARY ARTERY (HCC): ICD-10-CM

## 2024-12-02 NOTE — TELEPHONE ENCOUNTER
Called and talked with the Daughter, stated that they will forgo the appointment.  She is confused as to why after 2 years that they are getting a call.   Wondering if this is not urgent and Mom is not having any issues at this time.  Also Mom is on Dialysis 3 times a week.    Patient called the RX Refill Line. Message is being forwarded to the office.     Patient is requesting atorvastatin (LIPITOR) 40 mg tablet   Patient requested a week ago.    Please contact patient at : [phone]

## 2024-12-03 RX ORDER — ATORVASTATIN CALCIUM 40 MG/1
20 TABLET, FILM COATED ORAL DAILY
Qty: 90 TABLET | Refills: 3 | Status: SHIPPED | OUTPATIENT
Start: 2024-12-03

## 2024-12-17 ENCOUNTER — OFFICE VISIT (OUTPATIENT)
Dept: CARDIOLOGY CLINIC | Facility: CLINIC | Age: 69
End: 2024-12-17
Payer: COMMERCIAL

## 2024-12-17 VITALS
SYSTOLIC BLOOD PRESSURE: 142 MMHG | HEART RATE: 82 BPM | HEIGHT: 70 IN | BODY MASS INDEX: 30.92 KG/M2 | RESPIRATION RATE: 16 BRPM | OXYGEN SATURATION: 96 % | DIASTOLIC BLOOD PRESSURE: 78 MMHG | WEIGHT: 216 LBS

## 2024-12-17 DIAGNOSIS — N18.31 STAGE 3A CHRONIC KIDNEY DISEASE (HCC): ICD-10-CM

## 2024-12-17 DIAGNOSIS — I50.33 ACUTE ON CHRONIC DIASTOLIC (CONGESTIVE) HEART FAILURE (HCC): ICD-10-CM

## 2024-12-17 DIAGNOSIS — I25.118 CORONARY ARTERY DISEASE INVOLVING NATIVE HEART WITH OTHER FORM OF ANGINA PECTORIS, UNSPECIFIED VESSEL OR LESION TYPE (HCC): Primary | ICD-10-CM

## 2024-12-17 DIAGNOSIS — I21.11 ST ELEVATION MYOCARDIAL INFARCTION INVOLVING RIGHT CORONARY ARTERY (HCC): ICD-10-CM

## 2024-12-17 DIAGNOSIS — Z72.0 TOBACCO USE: ICD-10-CM

## 2024-12-17 PROCEDURE — 99214 OFFICE O/P EST MOD 30 MIN: CPT | Performed by: INTERNAL MEDICINE

## 2024-12-17 RX ORDER — METOPROLOL TARTRATE 25 MG/1
25 TABLET, FILM COATED ORAL EVERY 12 HOURS SCHEDULED
Qty: 180 TABLET | Refills: 3 | Status: SHIPPED | OUTPATIENT
Start: 2024-12-17

## 2024-12-17 NOTE — PROGRESS NOTES
"                                           Cardiology Follow Up    Ladarius Tabares  1955  [id number]  [facility] [place] [address] [address]  [facility] [address] [phone]    1. Coronary artery disease involving native heart with other form of angina pectoris, unspecified vessel or lesion type (HCC)  2. ST elevation myocardial infarction involving right coronary artery (HCC)  3. Acute on chronic diastolic (congestive) heart failure (HCC)  4. Stage 3a chronic kidney disease (HCC)  5. Tobacco use  6. Hyperlipidemia  7. GIST  8. Leg pain.     Plan:   HE is doing well, stable and asymptomatic from a cv standpoint.   Continue asa, atorvastatin, metoprolol.   He is due for lipids, seeing PCP soon.     Interval History: 68-year-old man had right coronary related myocardial infarction with symptoms of \"an elephant on my chest\".  He developed heart failure afterwards.    Echo shows normal left ventricular systolic function as below.  He was on Gleevec therefor checked a GLS which also was normal.    He is physically active without symptoms. He does get some otero when walking up hills. No cp. He got L calf and thigh pain when shoveling snow. He remains active but no formal exercise program.     Echo 8/9/2024    Limited echocardiogram for LVEF and strain.    Left Ventricle: Left ventricular cavity size is normal. Wall thickness is normal. The left ventricular ejection fraction is 60%. Systolic function is normal. Wall motion is normal.  Global longitudinal strain was -18.5% with decreased strain in basal anterior, basal septal, mid anterior, mid inferior, mid inferolateral and apical inferior segments    NM Rx Stress  9/21/2020: IMPRESSIONS: Normal study after pharmacologic vasodilation without reproduction of symptoms.  Stress ekg negative for ischemia There was image artifact, without diagnostic evidence for perfusion abnormality.  Mildly reduced LV " systolic function without regional wall motion abnormalities    Cardiac cath 5/16/2019:    Angiographic findings  Native coronary lesions:  ï¾·Ostial LAD: Lesion 1: discrete, 30 % stenosis.  ï¾·Proximal RCA: Lesion 1: tubular, 100 % stenosis, large vascular territory.  Intervention results  Native coronary lesions:  ï¾·Successful drug-eluting stent of the 100 % stenosis in proximal RCA. Appearance excellent with 0 % residual stenosis. Stent: SYNERGY 3.5 X 24 drug-eluting.    ABIs 12/2019:    Evaluation shows normal CLAUDETTE's bilaterally pre exercise and normal CLAUDETTE's post 3  minutes of exercise.          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, malignant (HCC)    Coronary artery disease involving native heart with other form of angina pectoris, unspecified vessel or lesion type (HCC)    Stage 3a chronic kidney disease (HCC)    Prediabetes    Vitamin D deficiency    Chronic kidney disease-mineral and bone disorder    Acute on chronic diastolic (congestive) heart failure (HCC)    Systolic congestive heart failure (HCC)    Primary hypertension     Past Medical History:   Diagnosis Date    Cancer (HCC) 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    Chronic kidney disease     Hyperlipidemia     Hypertension     Myocardial infarction (HCC) 2019     Social History     Socioeconomic History    Marital status: /Civil Union     Spouse name: Not on file    Number of children: Not on file    Years of education: Not on file    Highest education level: Not on file   Occupational History    Not on file   Tobacco Use    Smoking status: Former     Current packs/day: 0.00     Average packs/day: 1 pack/day for 44.0 years (44.0 ttl pk-yrs)     Types: Cigarettes     Start date: 6/15/1975     Quit date: 5/12/2019     Years since  quittin.6     Passive exposure: Current    Smokeless tobacco: Never   Vaping Use    Vaping status: Never Used   Substance and Sexual Activity    Alcohol use: Never    Drug use: Never    Sexual activity: Not Currently     Partners: Female   Other Topics Concern    Not on file   Social History Narrative    Not on file     Social Drivers of Health     Financial Resource Strain: Low Risk  (11/10/2022)    Overall Financial Resource Strain (CARDIA)     Difficulty of Paying Living Expenses: Not very hard   Food Insecurity: No Food Insecurity (2024)    Nursing - Inadequate Food Risk Classification     Worried About Running Out of Food in the Last Year: Never true     Ran Out of Food in the Last Year: Never true     Ran Out of Food in the Last Year: Not on file   Transportation Needs: No Transportation Needs (2024)    PRAPARE - Transportation     Lack of Transportation (Medical): No     Lack of Transportation (Non-Medical): No   Physical Activity: Not on file   Stress: Not on file   Social Connections: Not on file   Intimate Partner Violence: Not on file   Housing Stability: Low Risk  (2024)    Housing Stability Vital Sign     Unable to Pay for Housing in the Last Year: No     Number of Times Moved in the Last Year: 0     Homeless in the Last Year: No      Family History   Problem Relation Age of Onset    Alzheimer's disease Father      Past Surgical History:   Procedure Laterality Date    ABDOMINAL SURGERY      had a gist   tumour  removed    CORONARY ANGIOPLASTY WITH STENT PLACEMENT  2019    x1 stent    EGD      UT TYMPANOSTOMY GENERAL ANESTHESIA Right 2023    Procedure: MYRINGOTOMY W/ INSERTION VENTILATION TUBE EAR;  Surgeon: Bernardino Farrell MD;  Location: [facility];  Service: ENT    UT TYMPANOSTOMY GENERAL ANESTHESIA Right 2024    Procedure: MYRINGOTOMY W/ INSERTION T-TUBE EAR;  Surgeon: Bernardino Farrell MD;  Location: [facility];  Service: ENT       Current Outpatient Medications:      aspirin (ECOTRIN LOW STRENGTH) 81 mg EC tablet, Take 1 tablet (81 mg total) by mouth daily, Disp: 30 tablet, Rfl: 0    atorvastatin (LIPITOR) 40 mg tablet, Take 0.5 tablets (20 mg total) by mouth daily Takes half daily, Disp: 90 tablet, Rfl: 3    cetirizine (ZyrTEC) 10 mg tablet, Take 10 mg by mouth daily, Disp: , Rfl:     fluticasone (FLONASE) 50 mcg/act nasal spray, 1 spray into each nostril daily (Patient taking differently: 1 spray into each nostril if needed), Disp: 18.2 mL, Rfl: 1    metoprolol tartrate (LOPRESSOR) 25 mg tablet, Take 1 tablet (25 mg total) by mouth every 12 (twelve) hours, Disp: 180 tablet, Rfl: 1  No Known Allergies    Labs:  Hospital Outpatient Visit on 10/22/2024   Component Date Value    Case Report 10/22/2024                      Value:Surgical Pathology Report                         Case: [id number]                                  Authorizing Provider:  Robbie Marie MD      Collected:           10/22/2024 1053              Ordering Location:     [facility] Received:            10/22/2024 1215                                     Endoscopy                                                                    Pathologist:           Ignacio Wilkins MD                                                          Specimens:   A) - Polyp, Colorectal, Cecum                                                                       B) - Polyp, Colorectal, Recto Sigmoid                                                      Final Diagnosis 10/22/2024                      Value:A. Polyp, Colorectal, Cecum:  -   Polypoid portion of colonic mucosa with associated lymphoid aggregate.     B. Polyp, Colorectal, Recto Sigmoid:  -   Polypoid portion of colonic mucosa with reactive lymphoid aggregate.        Note 10/22/2024                      Value:Immunohistochemical stains show:  CD20 highlights increase in B-cells.  CD3 highlights T-cells which are positive for CD5.  BCL-6 highlights  "the reactive germinal center that is appropriately negative for BCL-2.  BCL-1 is negative.      Additional Information 10/22/2024                      Value:All reported additional testing was performed with appropriately reactive controls.  These tests were developed and their performance characteristics determined by [facility] or appropriate performing facility, though some tests may be performed on tissues which have not been validated for performance characteristics (such as staining performed on alcohol exposed cell blocks and decalcified tissues).  Results should be interpreted with caution and in the context of the patients’ clinical condition. These tests may not be cleared or approved by the U.S. Food and Drug Administration, though the FDA has determined that such clearance or approval is not necessary. These tests are used for clinical purposes and they should not be regarded as investigational or for research. This laboratory has been approved by [place] 88, designated as a high-complexity laboratory and is qualified to perform these tests.  .Interpretation performed at [facility] [address] [place] [address]        Synoptic Checklist 10/22/2024                      Value:                            COLON/RECTUM POLYP FORM - GI - All Specimens                                                                                     :    Other                                                         :    LA      Gross Description 10/22/2024                      Value:A. The specimen is received in formalin, labeled with the patient's name and hospital number, and is designated \" cecum.\"  Specimen consists of a single tan sessile polyp that measures 0.8 x 0.6 x 0.2 cm.  The specimen is inked and sectioned.  Totally submitted in 1 screen cassette.  B. The specimen is received in formalin, labeled with the patient's name and hospital number, and is " "designated \" rectosigmoid.\"  The specimen consists of 2 tan soft tissue fragments measuring 0.2 to 0.7 cm.  Totally submitted in 1 screen cassette.  Note: The estimated total formalin fixation time based upon information provided by the submitting clinician and the standard processing schedule is under 72 hours.  TStevens      Clinical Information 10/22/2024                      Value:Cold snare polypectomy     Imaging: No results found.    Review of Systems:  Review of Systems    Physical Exam:  128/84.  Heart rate 75 and regular.  Lungs clear.  Carotids 2+ without bruits.  No murmurs or gallops.  No organomegaly.  No edema.    Discussion/Summary:    1.  Coronary artery disease without angina pectoris  2.  Gleevec therapy for GIST tumor    Recommendations:    1.  DC furosemide  2.  Recheck GLS and ejection fraction in 1 year and return afterwards.      Kenan Burgos MD  "

## 2024-12-22 ENCOUNTER — OFFICE VISIT (OUTPATIENT)
Age: 69
End: 2024-12-22
Payer: COMMERCIAL

## 2024-12-22 VITALS
DIASTOLIC BLOOD PRESSURE: 86 MMHG | TEMPERATURE: 98.3 F | BODY MASS INDEX: 31.42 KG/M2 | SYSTOLIC BLOOD PRESSURE: 140 MMHG | WEIGHT: 219 LBS | RESPIRATION RATE: 18 BRPM | HEART RATE: 94 BPM | OXYGEN SATURATION: 96 %

## 2024-12-22 DIAGNOSIS — J06.9 VIRAL URI WITH COUGH: Primary | ICD-10-CM

## 2024-12-22 PROCEDURE — 99213 OFFICE O/P EST LOW 20 MIN: CPT

## 2024-12-22 PROCEDURE — S9083 URGENT CARE CENTER GLOBAL: HCPCS

## 2024-12-22 RX ORDER — BENZONATATE 200 MG/1
200 CAPSULE ORAL 3 TIMES DAILY PRN
Qty: 20 CAPSULE | Refills: 0 | Status: SHIPPED | OUTPATIENT
Start: 2024-12-22

## 2024-12-22 RX ORDER — FLUTICASONE PROPIONATE 50 MCG
1 SPRAY, SUSPENSION (ML) NASAL DAILY
Qty: 11.1 ML | Refills: 0 | Status: SHIPPED | OUTPATIENT
Start: 2024-12-22

## 2024-12-22 NOTE — PATIENT INSTRUCTIONS
Please trial Tessalon every 8 hours as needed for cough.   Please trial Flonase daily.   Continue with OTC cough and cold medication.   Drink plenty of fluids.

## 2024-12-22 NOTE — PROGRESS NOTES
[facility]        NAME: Ladarius Tabares is a 69 y.o. male  : 1955    MRN: [id number]  DATE: 2024  TIME: 4:06 PM    Assessment and Plan   Viral URI with cough [J06.9]  1. Viral URI with cough  fluticasone (FLONASE) 50 mcg/act nasal spray    benzonatate (TESSALON) 200 MG capsule            Patient Instructions     Please trial Tessalon every 8 hours as needed for cough.   Please trial Flonase daily.   Continue with OTC cough and cold medication.   Drink plenty of fluids.   Follow up with PCP in 3-5 days.  Proceed to  ER if symptoms worsen.    If tests are performed, our office will contact you with results only if changes need to made to the care plan discussed with you at the visit. You can review your full results on [facility].    Chief Complaint     Chief Complaint   Patient presents with    Cold Like Symptoms     Pt states he has sinus congestion, cough, and body aches for 3 days. Pt trying mucinex.         History of Present Illness       69-year-old male presents for evaluation of upper respiratory symptoms.  Over the past few days patient has been experiencing chills, congestion, sore throat and cough.  Has been taking Mucinex with mild relief of his symptoms.        Review of Systems   Review of Systems   Constitutional:  Positive for chills. Negative for fever.   HENT:  Positive for congestion and sore throat.    Respiratory:  Positive for cough. Negative for shortness of breath.    Cardiovascular:  Negative for chest pain.   Gastrointestinal:  Negative for diarrhea, nausea and vomiting.   All other systems reviewed and are negative.        Current Medications       Current Outpatient Medications:     aspirin (ECOTRIN LOW STRENGTH) 81 mg EC tablet, Take 1 tablet (81 mg total) by mouth daily, Disp: 30 tablet, Rfl: 0    atorvastatin (LIPITOR) 40 mg tablet, Take 0.5 tablets (20 mg total) by mouth daily Takes half daily, Disp: 90 tablet, Rfl: 3    benzonatate (TESSALON)  200 MG capsule, Take 1 capsule (200 mg total) by mouth 3 (three) times a day as needed for cough, Disp: 20 capsule, Rfl: 0    cetirizine (ZyrTEC) 10 mg tablet, Take 10 mg by mouth daily, Disp: , Rfl:     fluticasone (FLONASE) 50 mcg/act nasal spray, 1 spray into each nostril daily, Disp: 18.2 mL, Rfl: 1    fluticasone (FLONASE) 50 mcg/act nasal spray, 1 spray into each nostril daily, Disp: 11.1 mL, Rfl: 0    metoprolol tartrate (LOPRESSOR) 25 mg tablet, Take 1 tablet (25 mg total) by mouth every 12 (twelve) hours, Disp: 180 tablet, Rfl: 3    Current Allergies     Allergies as of 12/22/2024    (No Known Allergies)            The following portions of the patient's history were reviewed and updated as appropriate: allergies, current medications, past family history, past medical history, past social history, past surgical history and problem list.     Past Medical History:   Diagnosis Date    Cancer (HCC) 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    Chronic kidney disease     Hyperlipidemia     Hypertension     Myocardial infarction (HCC) 2019       Past Surgical History:   Procedure Laterality Date    ABDOMINAL SURGERY      had a gist   tumour  removed    CORONARY ANGIOPLASTY WITH STENT PLACEMENT  2019    x1 stent    EGD      AR TYMPANOSTOMY GENERAL ANESTHESIA Right 01/19/2023    Procedure: MYRINGOTOMY W/ INSERTION VENTILATION TUBE EAR;  Surgeon: Bernardino Farrell MD;  Location: [facility] OR;  Service: ENT    AR TYMPANOSTOMY GENERAL ANESTHESIA Right 7/18/2024    Procedure: MYRINGOTOMY W/ INSERTION T-TUBE EAR;  Surgeon: Bernardino Farrell MD;  Location: [facility];  Service: ENT       Family History   Problem Relation Age of Onset    Alzheimer's disease Father          Medications have been verified.        Objective   /86   Pulse 94   Temp 98.3 °F (36.8 °C)   Resp 18   Wt 99.3 kg (219 lb)   SpO2 96%   BMI 31.42 kg/m²        Physical Exam     Physical Exam  Vitals and nursing note reviewed.   Constitutional:       General: He  is not in acute distress.     Appearance: Normal appearance. He is not ill-appearing.   HENT:      Head: Normocephalic and atraumatic.      Right Ear: Tympanic membrane normal.      Left Ear: Tympanic membrane normal.      Nose: Congestion present. No rhinorrhea.      Mouth/Throat:      Mouth: Mucous membranes are moist.      Pharynx: Oropharynx is clear. Posterior oropharyngeal erythema present. No oropharyngeal exudate.   Eyes:      General:         Right eye: No discharge.   Cardiovascular:      Rate and Rhythm: Normal rate and regular rhythm.      Pulses: Normal pulses.      Heart sounds: Normal heart sounds. No murmur heard.     No friction rub. No gallop.   Pulmonary:      Effort: Pulmonary effort is normal. No respiratory distress.      Breath sounds: Normal breath sounds. No stridor. No wheezing, rhonchi or rales.   Abdominal:      General: Bowel sounds are normal.      Palpations: Abdomen is soft.      Tenderness: There is no abdominal tenderness.   Skin:     General: Skin is warm and dry.   Neurological:      Mental Status: He is alert.   Psychiatric:         Mood and Affect: Mood normal.         Behavior: Behavior normal.

## 2024-12-23 ENCOUNTER — DOCUMENTATION (OUTPATIENT)
Dept: ADMINISTRATIVE | Facility: OTHER | Age: 69
End: 2024-12-23

## 2024-12-23 NOTE — PROGRESS NOTES
FRANCK Richard  P Patient Reported Team         Blood pressure elevated  Appointment department: [facility]  Appointment provider: FRANCK Richard  Blood pressure  12/22/24 1603 140/86  12/22/24 1600 142/86  12/23/24 12:52 PM    Patient was called after the Urgent Care visit ; a message was left for the patient to return the call    Thank you.  Giana Henry MA  [facility]

## 2025-01-13 ENCOUNTER — RA CDI HCC (OUTPATIENT)
Dept: OTHER | Facility: HOSPITAL | Age: 70
End: 2025-01-13

## 2025-01-13 NOTE — PROGRESS NOTES
I13.0  HCC coding opportunities          Chart Reviewed number of suggestions sent to Provider: 1     Patients Insurance     Medicare Insurance: [employer]vantage           SUBJECTIVE:    Patient is a 48y old Male who presents with a chief complaint of chest pain (12 Feb 2019 14:07)    Currently admitted to medicine with the primary diagnosis of Chest pain     Today is hospital day 4d. Overnight    PAST MEDICAL & SURGICAL HISTORY  Coronary artery disease  High cholesterol  Hypertension, unspecified type  History of quadruple bypass  History of appendectomy        ALLERGIES:  No Known Allergies    MEDICATIONS:  STANDING MEDICATIONS  aspirin  chewable 81 milliGRAM(s) Oral daily  atorvastatin 40 milliGRAM(s) Oral at bedtime  chlorhexidine 4% Liquid 1 Application(s) Topical <User Schedule>  clopidogrel Tablet 75 milliGRAM(s) Oral daily  enoxaparin Injectable 40 milliGRAM(s) SubCutaneous daily  escitalopram 10 milliGRAM(s) Oral daily  lisinopril 5 milliGRAM(s) Oral daily  metoprolol tartrate 25 milliGRAM(s) Oral two times a day  sodium chloride 0.9%. 1000 milliLiter(s) IV Continuous <Continuous>    PRN MEDICATIONS    VITALS:   T(F): 96.5  HR: 72  BP: 110/64  RR: 18  SpO2: --    LABS:                        14.2   6.06  )-----------( 247      ( 12 Feb 2019 05:25 )             41.9     02-12    141  |  100  |  19  ----------------------------<  89  4.3   |  26  |  1.1    Ca    9.7      12 Feb 2019 05:25  Mg     1.9     02-12    TPro  6.6  /  Alb  4.1  /  TBili  0.3  /  DBili  x   /  AST  27  /  ALT  43<H>  /  AlkPhos  60  02-12    PT/INR - ( 12 Feb 2019 05:25 )   PT: 11.00 sec;   INR: 0.96 ratio         PTT - ( 12 Feb 2019 05:25 )  PTT:33.0 sec              RADIOLOGY:    PHYSICAL EXAM:  GENERAL: NAD, speaks in full sentences, no signs of respiratory distress  HEAD:  Atraumatic, Normocephalic  EYES: EOMI, PERRLA, conjunctiva and sclera clear  NECK: Supple, No JVD  PULM: CTAB; No wheeze; No crackles; No accessory muscles used  CVA: Regular rate and rhythm; No murmurs;   ABDOMEN: Soft, Nontender, Nondistended; Bowel sounds present; No guarding  EXTREMITIES:  2+ Peripheral Pulses, No cyanosis or edema  PSYCH: AAOx3  NEUROLOGY: non-focal  SKIN: No rashes or lesions    Intravenous access:   NG tube:   Duval Catheter: SUBJECTIVE:    Patient is a 48y old Male who presents with a chief complaint of chest pain (12 Feb 2019 14:07)    Currently admitted to medicine with the primary diagnosis of Chest pain     Today is hospital day 4d. Overnight no event. Denied CP in last 24 hr, SOB, abd pain, dysuria, constipation/diarrhea.     PAST MEDICAL & SURGICAL HISTORY  Coronary artery disease  High cholesterol  Hypertension, unspecified type  History of quadruple bypass  History of appendectomy        ALLERGIES:  No Known Allergies    MEDICATIONS:  STANDING MEDICATIONS  aspirin  chewable 81 milliGRAM(s) Oral daily  atorvastatin 40 milliGRAM(s) Oral at bedtime  chlorhexidine 4% Liquid 1 Application(s) Topical <User Schedule>  clopidogrel Tablet 75 milliGRAM(s) Oral daily  enoxaparin Injectable 40 milliGRAM(s) SubCutaneous daily  escitalopram 10 milliGRAM(s) Oral daily  lisinopril 5 milliGRAM(s) Oral daily  metoprolol tartrate 25 milliGRAM(s) Oral two times a day  sodium chloride 0.9%. 1000 milliLiter(s) IV Continuous <Continuous>    PRN MEDICATIONS    VITALS:   T(F): 96.5  HR: 72  BP: 110/64  RR: 18  SpO2: --    LABS:                        14.2   6.06  )-----------( 247      ( 12 Feb 2019 05:25 )             41.9     02-12    141  |  100  |  19  ----------------------------<  89  4.3   |  26  |  1.1    Ca    9.7      12 Feb 2019 05:25  Mg     1.9     02-12    TPro  6.6  /  Alb  4.1  /  TBili  0.3  /  DBili  x   /  AST  27  /  ALT  43<H>  /  AlkPhos  60  02-12    PT/INR - ( 12 Feb 2019 05:25 )   PT: 11.00 sec;   INR: 0.96 ratio         PTT - ( 12 Feb 2019 05:25 )  PTT:33.0 sec              RADIOLOGY:    PHYSICAL EXAM:  GENERAL: NAD, speaks in full sentences, no signs of respiratory distress  HEAD:  Atraumatic, Normocephalic  EYES: EOMI, PERRLA, conjunctiva and sclera clear  NECK: Supple, No JVD  PULM: CTAB; No wheeze; No crackles; No accessory muscles used  CVA: Regular rate and rhythm; No murmurs;   ABDOMEN: Soft, Nontender, Nondistended; Bowel sounds present; No guarding  EXTREMITIES:  2+ Peripheral Pulses, No cyanosis or edema  PSYCH: AAOx3  NEUROLOGY: non-focal  SKIN: No rashes or lesions    Intravenous access:   NG tube:   Duval Catheter:

## 2025-01-21 ENCOUNTER — OFFICE VISIT (OUTPATIENT)
Dept: FAMILY MEDICINE CLINIC | Facility: CLINIC | Age: 70
End: 2025-01-21
Payer: COMMERCIAL

## 2025-01-21 VITALS
OXYGEN SATURATION: 94 % | BODY MASS INDEX: 31.55 KG/M2 | HEART RATE: 88 BPM | SYSTOLIC BLOOD PRESSURE: 130 MMHG | HEIGHT: 70 IN | TEMPERATURE: 98.9 F | WEIGHT: 220.4 LBS | DIASTOLIC BLOOD PRESSURE: 80 MMHG | RESPIRATION RATE: 16 BRPM

## 2025-01-21 DIAGNOSIS — C49.A3 GIST (GASTROINTESTINAL STROMAL TUMOR) OF SMALL BOWEL, MALIGNANT (HCC): ICD-10-CM

## 2025-01-21 DIAGNOSIS — R09.81 CHRONIC NASAL CONGESTION: ICD-10-CM

## 2025-01-21 DIAGNOSIS — F41.1 GENERALIZED ANXIETY DISORDER WITH PANIC ATTACKS: Primary | ICD-10-CM

## 2025-01-21 DIAGNOSIS — N18.31 STAGE 3A CHRONIC KIDNEY DISEASE (HCC): ICD-10-CM

## 2025-01-21 DIAGNOSIS — I50.33 ACUTE ON CHRONIC DIASTOLIC (CONGESTIVE) HEART FAILURE (HCC): ICD-10-CM

## 2025-01-21 DIAGNOSIS — I25.118 CORONARY ARTERY DISEASE INVOLVING NATIVE HEART WITH OTHER FORM OF ANGINA PECTORIS, UNSPECIFIED VESSEL OR LESION TYPE (HCC): ICD-10-CM

## 2025-01-21 DIAGNOSIS — F41.0 GENERALIZED ANXIETY DISORDER WITH PANIC ATTACKS: Primary | ICD-10-CM

## 2025-01-21 PROBLEM — I73.9 CLAUDICATION (HCC): Status: RESOLVED | Noted: 2021-10-18 | Resolved: 2025-01-21

## 2025-01-21 PROCEDURE — G2211 COMPLEX E/M VISIT ADD ON: HCPCS | Performed by: NURSE PRACTITIONER

## 2025-01-21 PROCEDURE — 99214 OFFICE O/P EST MOD 30 MIN: CPT | Performed by: NURSE PRACTITIONER

## 2025-01-21 RX ORDER — ESCITALOPRAM OXALATE 5 MG/1
5 TABLET ORAL DAILY
Qty: 30 TABLET | Refills: 1 | Status: SHIPPED | OUTPATIENT
Start: 2025-01-21

## 2025-01-21 RX ORDER — FLUTICASONE PROPIONATE 50 MCG
1 SPRAY, SUSPENSION (ML) NASAL DAILY
Qty: 18.2 ML | Refills: 1 | Status: SHIPPED | OUTPATIENT
Start: 2025-01-21

## 2025-01-21 NOTE — ASSESSMENT & PLAN NOTE
Denies cardiac symptoms.  To continue on aspirin, atorvastatin, metoprolol as prescribed.  To continue follow-up with cardiology as scheduled.

## 2025-01-21 NOTE — ASSESSMENT & PLAN NOTE
Wt Readings from Last 3 Encounters:   01/21/25 100 kg (220 lb 6.4 oz)   12/22/24 99.3 kg (219 lb)   12/17/24 98 kg (216 lb)     Has been asymptomatic and stable.  To continue on current medications prescribed.  To continue follow-up with cardiology as scheduled.

## 2025-01-21 NOTE — ASSESSMENT & PLAN NOTE
Lab Results   Component Value Date    EGFR 63 12/31/2024    EGFR 65 07/30/2024    EGFR 52 07/24/2024    CREATININE 1.23 12/31/2024    CREATININE 1.19 07/30/2024    CREATININE 1.35 (H) 07/24/2024     Has remained stable.  Denies inability to urinate.  Discussed importance of blood pressure control, increase hydration, avoidance of nephrotoxic medications like NSAIDs.  Will continue to monitor with CMP.

## 2025-01-21 NOTE — PROGRESS NOTES
Name: Ladarius Tabares      : 1955      MRN: [id number]  Encounter Provider: FRANCK Hawkins  Encounter Date: 2025   Encounter department: [facility] [address]  :  Assessment & Plan  Generalized anxiety disorder with panic attacks  Patient notes increase in panic attacks.  Denies SI/HI.  Denies symptoms related to depression.  Does not feel he needs behavioral therapy at this time.  Will initiate escitalopram 5 mg.  Educated patient on how to take medication and possible side effects.  Disussed importance of adequate sleep, hydration, nutrition.  Discussed deep breathing exercises and self-care.  Advised to follow-up in 6 to 8 weeks for reevaluation.  Orders:    escitalopram (LEXAPRO) 5 mg tablet; Take 1 tablet (5 mg total) by mouth daily    Chronic nasal congestion  Advised steam, saline rinses twice daily, humidified air, avoid irritants. To restart Flonase daily as prescribed.    Orders:    fluticasone (FLONASE) 50 mcg/act nasal spray; 1 spray into each nostril daily    Acute on chronic diastolic (congestive) heart failure (HCC)  Wt Readings from Last 3 Encounters:   25 100 kg (220 lb 6.4 oz)   24 99.3 kg (219 lb)   24 98 kg (216 lb)     Has been asymptomatic and stable.  To continue on current medications prescribed.  To continue follow-up with cardiology as scheduled.               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, malignant (HCC)  Stable.  Continues to follow-up with [facility] oncology team.  Had recent CT scan.  Colonoscopy up-to-date.  Continue follow-up with [facility] oncology team as scheduled.       Coronary artery disease involving native heart with other form of angina pectoris, unspecified vessel or lesion type (HCC)  Denies cardiac symptoms.  To continue on aspirin, atorvastatin, metoprolol as prescribed.  To continue follow-up with cardiology as scheduled.       Stage 3a chronic kidney disease  "([facility])  Lab Results   Component Value Date    EGFR 63 12/31/2024    EGFR 65 07/30/2024    EGFR 52 07/24/2024    CREATININE 1.23 12/31/2024    CREATININE 1.19 07/30/2024    CREATININE 1.35 (H) 07/24/2024     Has remained stable.  Denies inability to urinate.  Discussed importance of blood pressure control, increase hydration, avoidance of nephrotoxic medications like NSAIDs.  Will continue to monitor with CMP.              History of Present Illness   Patient presents office for 4-month follow-up.  He continues to follow-up with ENT, oncology, cardiology.  Notes compliance with daily medications.  He denies any symptoms related to chronic issues.  Denies chest pain, shortness of breath, palpitations, weakness, syncope.    Patient has been having episodes of panic attacks over the past 3 months.  Per patient, states he is to have panic attacks several years ago, but was never treated for this.  States over the past 3 months he has had 3 episodes where he felt \"everything was closing in on him.\"  Felt claustrophobic.  Episodes are unprovoked and occurred suddenly.  Patient denies chest pain, palpitations, headaches, nausea during episodes.  Episodes have self resolved on their own with deep breathing.  Patient denies recent traumatic event, symptoms related to depression.  Denies alcohol or drug use.      Review of Systems   Constitutional:  Negative for activity change, appetite change and fatigue.   HENT:  Negative for congestion, sore throat and trouble swallowing.    Eyes:  Negative for photophobia and visual disturbance.   Respiratory:  Negative for cough, chest tightness and shortness of breath.    Cardiovascular:  Negative for palpitations.   Gastrointestinal:  Negative for abdominal pain, blood in stool, nausea and vomiting.   Genitourinary:  Negative for decreased urine volume.   Musculoskeletal:  Negative for arthralgias and myalgias.   Skin:  Negative for color change and rash.   Neurological:  Negative for " "dizziness, light-headedness and headaches.   Hematological:  Negative for adenopathy.   Psychiatric/Behavioral:  Negative for agitation, confusion, dysphoric mood, hallucinations, self-injury, sleep disturbance and suicidal ideas. The patient is nervous/anxious.        Objective   /80 (BP Location: Right arm, Patient Position: Sitting, Cuff Size: Large)   Pulse 88   Temp 98.9 °F (37.2 °C)   Resp 16   Ht 5' 10\" (1.778 m)   Wt 100 kg (220 lb 6.4 oz)   SpO2 94%   BMI 31.62 kg/m²      Physical Exam  Vitals reviewed.   Constitutional:       General: He is not in acute distress.     Appearance: Normal appearance. He is not ill-appearing.   HENT:      Head: Normocephalic and atraumatic.      Right Ear: Tympanic membrane, ear canal and external ear normal.      Left Ear: Tympanic membrane, ear canal and external ear normal.      Nose: Nose normal.      Mouth/Throat:      Mouth: Mucous membranes are moist.      Pharynx: Oropharynx is clear.   Eyes:      Conjunctiva/sclera: Conjunctivae normal.      Pupils: Pupils are equal, round, and reactive to light.   Neck:      Vascular: No carotid bruit.   Cardiovascular:      Rate and Rhythm: Normal rate and regular rhythm.      Pulses: Normal pulses.      Heart sounds: Normal heart sounds. No murmur heard.  Pulmonary:      Effort: Pulmonary effort is normal.      Breath sounds: Normal breath sounds.   Musculoskeletal:      Cervical back: Normal range of motion and neck supple.      Right lower leg: No edema.      Left lower leg: No edema.   Lymphadenopathy:      Cervical: No cervical adenopathy.   Skin:     General: Skin is warm and dry.   Neurological:      General: No focal deficit present.      Mental Status: He is alert and oriented to person, place, and time.   Psychiatric:         Attention and Perception: Attention and perception normal. He does not perceive auditory or visual hallucinations.         Mood and Affect: Mood and affect normal.         Speech: Speech " normal.         Behavior: Behavior normal. Behavior is cooperative.         Thought Content: Thought content does not include homicidal or suicidal ideation. Thought content does not include homicidal or suicidal plan.

## 2025-01-21 NOTE — ASSESSMENT & PLAN NOTE
Stable.  Continues to follow-up with [facility] oncology team.  Had recent CT scan.  Colonoscopy up-to-date.  Continue follow-up with [facility] oncology team as scheduled.

## 2025-03-18 ENCOUNTER — RA CDI HCC (OUTPATIENT)
Dept: OTHER | Facility: HOSPITAL | Age: 70
End: 2025-03-18

## 2025-03-18 ENCOUNTER — APPOINTMENT (OUTPATIENT)
Age: 70
End: 2025-03-18
Payer: COMMERCIAL

## 2025-03-18 ENCOUNTER — RESULTS FOLLOW-UP (OUTPATIENT)
Dept: FAMILY MEDICINE CLINIC | Facility: CLINIC | Age: 70
End: 2025-03-18

## 2025-03-18 DIAGNOSIS — Z12.5 SCREENING FOR PROSTATE CANCER: ICD-10-CM

## 2025-03-18 DIAGNOSIS — E55.9 VITAMIN D DEFICIENCY: ICD-10-CM

## 2025-03-18 DIAGNOSIS — F41.1 GENERALIZED ANXIETY DISORDER WITH PANIC ATTACKS: ICD-10-CM

## 2025-03-18 DIAGNOSIS — R73.03 PREDIABETES: ICD-10-CM

## 2025-03-18 DIAGNOSIS — I10 PRIMARY HYPERTENSION: ICD-10-CM

## 2025-03-18 DIAGNOSIS — F41.0 GENERALIZED ANXIETY DISORDER WITH PANIC ATTACKS: ICD-10-CM

## 2025-03-18 DIAGNOSIS — E78.2 MIXED HYPERLIPIDEMIA: ICD-10-CM

## 2025-03-18 LAB
25(OH)D3 SERPL-MCNC: 30.5 NG/ML (ref 30–100)
ALBUMIN SERPL BCG-MCNC: 4.4 G/DL (ref 3.5–5)
ALP SERPL-CCNC: 119 U/L (ref 34–104)
ALT SERPL W P-5'-P-CCNC: 31 U/L (ref 7–52)
ANION GAP SERPL CALCULATED.3IONS-SCNC: 11 MMOL/L (ref 4–13)
AST SERPL W P-5'-P-CCNC: 19 U/L (ref 13–39)
BASOPHILS # BLD AUTO: 0.12 THOUSANDS/ÂΜL (ref 0–0.1)
BASOPHILS NFR BLD AUTO: 1 % (ref 0–1)
BILIRUB SERPL-MCNC: 0.75 MG/DL (ref 0.2–1)
BUN SERPL-MCNC: 22 MG/DL (ref 5–25)
CALCIUM SERPL-MCNC: 9.3 MG/DL (ref 8.4–10.2)
CHLORIDE SERPL-SCNC: 101 MMOL/L (ref 96–108)
CHOLEST SERPL-MCNC: 143 MG/DL (ref ?–200)
CO2 SERPL-SCNC: 29 MMOL/L (ref 21–32)
CREAT SERPL-MCNC: 1.26 MG/DL (ref 0.6–1.3)
CREAT UR-MCNC: 217.6 MG/DL
EOSINOPHIL # BLD AUTO: 0.33 THOUSAND/ÂΜL (ref 0–0.61)
EOSINOPHIL NFR BLD AUTO: 3 % (ref 0–6)
ERYTHROCYTE [DISTWIDTH] IN BLOOD BY AUTOMATED COUNT: 13.5 % (ref 11.6–15.1)
EST. AVERAGE GLUCOSE BLD GHB EST-MCNC: 194 MG/DL
GFR SERPL CREATININE-BSD FRML MDRD: 57 ML/MIN/1.73SQ M
GLUCOSE P FAST SERPL-MCNC: 178 MG/DL (ref 65–99)
HBA1C MFR BLD: 8.4 %
HCT VFR BLD AUTO: 48.8 % (ref 36.5–49.3)
HDLC SERPL-MCNC: 34 MG/DL
HGB BLD-MCNC: 16 G/DL (ref 12–17)
IMM GRANULOCYTES # BLD AUTO: 0.05 THOUSAND/UL (ref 0–0.2)
IMM GRANULOCYTES NFR BLD AUTO: 1 % (ref 0–2)
LDLC SERPL CALC-MCNC: 66 MG/DL (ref 0–100)
LYMPHOCYTES # BLD AUTO: 3.46 THOUSANDS/ÂΜL (ref 0.6–4.47)
LYMPHOCYTES NFR BLD AUTO: 33 % (ref 14–44)
MCH RBC QN AUTO: 32.1 PG (ref 26.8–34.3)
MCHC RBC AUTO-ENTMCNC: 32.8 G/DL (ref 31.4–37.4)
MCV RBC AUTO: 98 FL (ref 82–98)
MICROALBUMIN UR-MCNC: 17.1 MG/L
MICROALBUMIN/CREAT 24H UR: 8 MG/G CREATININE (ref 0–30)
MONOCYTES # BLD AUTO: 1.48 THOUSAND/ÂΜL (ref 0.17–1.22)
MONOCYTES NFR BLD AUTO: 14 % (ref 4–12)
NEUTROPHILS # BLD AUTO: 5.14 THOUSANDS/ÂΜL (ref 1.85–7.62)
NEUTS SEG NFR BLD AUTO: 48 % (ref 43–75)
NONHDLC SERPL-MCNC: 109 MG/DL
NRBC BLD AUTO-RTO: 0 /100 WBCS
PLATELET # BLD AUTO: 446 THOUSANDS/UL (ref 149–390)
PMV BLD AUTO: 10 FL (ref 8.9–12.7)
POTASSIUM SERPL-SCNC: 4.5 MMOL/L (ref 3.5–5.3)
PROT SERPL-MCNC: 6.9 G/DL (ref 6.4–8.4)
PSA SERPL-MCNC: 3.61 NG/ML (ref 0–4)
RBC # BLD AUTO: 4.99 MILLION/UL (ref 3.88–5.62)
SODIUM SERPL-SCNC: 141 MMOL/L (ref 135–147)
TRIGL SERPL-MCNC: 217 MG/DL (ref ?–150)
TSH SERPL DL<=0.05 MIU/L-ACNC: 2.6 UIU/ML (ref 0.45–4.5)
WBC # BLD AUTO: 10.58 THOUSAND/UL (ref 4.31–10.16)

## 2025-03-18 PROCEDURE — 36415 COLL VENOUS BLD VENIPUNCTURE: CPT

## 2025-03-18 PROCEDURE — 80061 LIPID PANEL: CPT

## 2025-03-18 PROCEDURE — 85025 COMPLETE CBC W/AUTO DIFF WBC: CPT

## 2025-03-18 PROCEDURE — 84443 ASSAY THYROID STIM HORMONE: CPT

## 2025-03-18 PROCEDURE — 82570 ASSAY OF URINE CREATININE: CPT

## 2025-03-18 PROCEDURE — 82306 VITAMIN D 25 HYDROXY: CPT

## 2025-03-18 PROCEDURE — 82043 UR ALBUMIN QUANTITATIVE: CPT

## 2025-03-18 PROCEDURE — 83036 HEMOGLOBIN GLYCOSYLATED A1C: CPT

## 2025-03-18 PROCEDURE — G0103 PSA SCREENING: HCPCS

## 2025-03-18 PROCEDURE — 80053 COMPREHEN METABOLIC PANEL: CPT

## 2025-03-19 RX ORDER — ESCITALOPRAM OXALATE 5 MG/1
5 TABLET ORAL DAILY
Qty: 30 TABLET | Refills: 0 | Status: SHIPPED | OUTPATIENT
Start: 2025-03-19

## 2025-03-25 ENCOUNTER — OFFICE VISIT (OUTPATIENT)
Dept: FAMILY MEDICINE CLINIC | Facility: CLINIC | Age: 70
End: 2025-03-25
Payer: COMMERCIAL

## 2025-03-25 VITALS
DIASTOLIC BLOOD PRESSURE: 60 MMHG | SYSTOLIC BLOOD PRESSURE: 110 MMHG | WEIGHT: 223.4 LBS | HEIGHT: 70 IN | OXYGEN SATURATION: 92 % | RESPIRATION RATE: 16 BRPM | BODY MASS INDEX: 31.98 KG/M2 | HEART RATE: 80 BPM

## 2025-03-25 DIAGNOSIS — F41.0 GENERALIZED ANXIETY DISORDER WITH PANIC ATTACKS: Primary | ICD-10-CM

## 2025-03-25 DIAGNOSIS — E11.9 TYPE 2 DIABETES MELLITUS WITHOUT COMPLICATION, WITHOUT LONG-TERM CURRENT USE OF INSULIN (HCC): ICD-10-CM

## 2025-03-25 DIAGNOSIS — E78.2 MIXED HYPERLIPIDEMIA: ICD-10-CM

## 2025-03-25 DIAGNOSIS — F41.1 GENERALIZED ANXIETY DISORDER WITH PANIC ATTACKS: Primary | ICD-10-CM

## 2025-03-25 LAB
LEFT EYE DIABETIC RETINOPATHY: NORMAL
LEFT EYE IMAGE QUALITY: NORMAL
LEFT EYE MACULAR EDEMA: NORMAL
LEFT EYE OTHER RETINOPATHY: NORMAL
RIGHT EYE DIABETIC RETINOPATHY: NORMAL
RIGHT EYE IMAGE QUALITY: NORMAL
RIGHT EYE MACULAR EDEMA: NORMAL
RIGHT EYE OTHER RETINOPATHY: NORMAL
SEVERITY (EYE EXAM): NORMAL

## 2025-03-25 PROCEDURE — 92250 FUNDUS PHOTOGRAPHY W/I&R: CPT | Performed by: NURSE PRACTITIONER

## 2025-03-25 PROCEDURE — 99214 OFFICE O/P EST MOD 30 MIN: CPT | Performed by: NURSE PRACTITIONER

## 2025-03-25 PROCEDURE — G2211 COMPLEX E/M VISIT ADD ON: HCPCS | Performed by: NURSE PRACTITIONER

## 2025-03-25 RX ORDER — LANCETS 33 GAUGE
EACH MISCELLANEOUS
Qty: 100 EACH | Refills: 3 | Status: SHIPPED | OUTPATIENT
Start: 2025-03-25

## 2025-03-25 RX ORDER — BLOOD SUGAR DIAGNOSTIC
STRIP MISCELLANEOUS
Qty: 100 EACH | Refills: 3 | Status: SHIPPED | OUTPATIENT
Start: 2025-03-25

## 2025-03-25 RX ORDER — METFORMIN HYDROCHLORIDE 500 MG/1
500 TABLET, EXTENDED RELEASE ORAL
Qty: 90 TABLET | Refills: 1 | Status: SHIPPED | OUTPATIENT
Start: 2025-03-25

## 2025-03-25 RX ORDER — BLOOD-GLUCOSE METER
KIT MISCELLANEOUS
Qty: 1 KIT | Refills: 0 | Status: SHIPPED | OUTPATIENT
Start: 2025-03-25

## 2025-03-25 RX ORDER — ATORVASTATIN CALCIUM 10 MG/1
10 TABLET, FILM COATED ORAL DAILY
Qty: 90 TABLET | Refills: 1 | Status: SHIPPED | OUTPATIENT
Start: 2025-03-25

## 2025-03-25 RX ORDER — ATORVASTATIN CALCIUM 20 MG/1
20 TABLET, FILM COATED ORAL DAILY
Qty: 90 TABLET | Refills: 1 | Status: SHIPPED | OUTPATIENT
Start: 2025-03-25

## 2025-03-25 NOTE — PROGRESS NOTES
Name: Ladarius Tabares      : 1955      MRN: [id number]  Encounter Provider: FRANCK Hawkins  Encounter Date: 3/25/2025   Encounter department: [facility] [address]  :  Assessment & Plan  Generalized anxiety disorder with panic attacks  Patient has been tolerating escitalopram 5 mg.  Denies worsening anxiety or panic attacks.  Will continue on current dose.       Type 2 diabetes mellitus without complication, without long-term current use of insulin ([facility])    Lab Results   Component Value Date    HGBA1C 8.4 (H) 2025     Blood work reviewed with patient.  Patient denies symptoms of hyperglycemia.  At this time, will initiate metformin 500 mg once daily.  Discussed with patient how to take medication possible side effects.  Discussed importance of adhering to diabetic diet, including discontinuing sugary drinks and soda and adhering to low carbohydrate, high-protein diet.    We discussed the importance of controlling blood glucose:  Premeal , even better <110   2hr after a meal <170, even better <140   A1C <7%, even better <6.5%.   We discussed importance of blood pressure control and lipid control to lower the risk for complications.   Encouraged advise to check home blood sugars discussed goals in range of therapy.   Diabetic eye exam and diabetic foot exam completed in office.    To return in 3 months for re-evaluation after blood work.  Orders:    Blood Glucose Monitoring Suppl (OneTouch Verio Reflect) w/Device KIT; Check blood sugars once daily. Please substitute with appropriate alternative as covered by patient's insurance. Dx: E11.65    glucose blood (OneTouch Verio) test strip; Check blood sugars once daily. Please substitute with appropriate alternative as covered by patient's insurance. Dx: E11.65    OneTouch Delica Lancets 33G MISC; Check blood sugars once daily. Please substitute with appropriate alternative as covered by patient's  insurance. Dx: E11.65    Hemoglobin A1C; Future    Comprehensive metabolic panel; Future    Lipid Panel with Direct LDL reflex; Future    metFORMIN (GLUCOPHAGE-XR) 500 mg 24 hr tablet; Take 1 tablet (500 mg total) by mouth daily with dinner    IRIS Diabetic eye exam    Mixed hyperlipidemia  Recent lipid panel has increased from previous readings.  Per patient, is taking atorvastatin 20 mg as prescribed, but notes difficulty in scoring his 40 mg tablet.  At this time, will increase atorvastatin from 20 mg to 30 mg.  Advised to repeat blood work in 3 months.    Orders:    Comprehensive metabolic panel; Future    Lipid Panel with Direct LDL reflex; Future    atorvastatin (LIPITOR) 10 mg tablet; Take 1 tablet (10 mg total) by mouth daily    atorvastatin (LIPITOR) 20 mg tablet; Take 1 tablet (20 mg total) by mouth daily           History of Present Illness   Patient presents to the office for 2-month follow-up.  Patient was initiated on escitalopram 5 mg during last visit.  Notes compliance with medication, denies adverse side effects.  Denies worsening anxiety, panic attacks.  Denies SI/HI.      Review of Systems   Constitutional:  Negative for activity change and appetite change.   HENT:  Negative for sore throat and trouble swallowing.    Eyes:  Negative for photophobia and visual disturbance.   Respiratory:  Negative for cough, chest tightness and shortness of breath.    Cardiovascular:  Negative for chest pain and palpitations.   Gastrointestinal:  Negative for abdominal pain, nausea and vomiting.   Endocrine: Negative for polydipsia, polyphagia and polyuria.   Genitourinary:  Negative for decreased urine volume.   Musculoskeletal:  Negative for arthralgias and myalgias.   Skin:  Negative for color change and rash.   Neurological:  Negative for dizziness, weakness, light-headedness, numbness and headaches.   Hematological:  Negative for adenopathy.   Psychiatric/Behavioral:  Negative for confusion. The patient is  "not nervous/anxious.        Objective   /60 (BP Location: Left arm, Cuff Size: Standard)   Pulse 80   Resp 16   Ht 5' 10\" (1.778 m)   Wt 101 kg (223 lb 6.4 oz)   SpO2 92%   BMI 32.05 kg/m²      Physical Exam  Vitals reviewed.   Constitutional:       General: He is not in acute distress.     Appearance: Normal appearance. He is not ill-appearing.   HENT:      Head: Normocephalic and atraumatic.      Right Ear: External ear normal.      Left Ear: External ear normal.      Nose: Nose normal.      Mouth/Throat:      Mouth: Mucous membranes are moist.      Pharynx: Oropharynx is clear.   Eyes:      Conjunctiva/sclera: Conjunctivae normal.      Pupils: Pupils are equal, round, and reactive to light.   Cardiovascular:      Rate and Rhythm: Normal rate and regular rhythm.      Pulses: Normal pulses. no weak pulses.           Dorsalis pedis pulses are 2+ on the right side and 2+ on the left side.      Heart sounds: Normal heart sounds. No murmur heard.  Pulmonary:      Effort: Pulmonary effort is normal.      Breath sounds: Normal breath sounds.   Musculoskeletal:         General: Normal range of motion.      Cervical back: Normal range of motion and neck supple.      Right lower leg: No edema.      Left lower leg: No edema.   Feet:      Right foot:      Skin integrity: No ulcer, skin breakdown, erythema, warmth, callus or dry skin.      Left foot:      Skin integrity: No ulcer, skin breakdown, erythema, warmth, callus or dry skin.   Lymphadenopathy:      Cervical: No cervical adenopathy.   Skin:     General: Skin is warm and dry.   Neurological:      General: No focal deficit present.      Mental Status: He is alert and oriented to person, place, and time.   Psychiatric:         Mood and Affect: Mood normal.         Behavior: Behavior normal.         Patient's shoes and socks removed.    Right Foot/Ankle   Right Foot Inspection  Skin Exam: skin normal and skin intact. No dry skin, no warmth, no callus, no " erythema, no maceration, no abnormal color, no pre-ulcer, no ulcer and no callus.     Toe Exam: ROM and strength within normal limits.     Sensory   Monofilament testing: intact    Vascular  Capillary refills: < 3 seconds  The right DP pulse is 2+.     Left Foot/Ankle  Left Foot Inspection  Skin Exam: skin normal and skin intact. No dry skin, no warmth, no erythema, no maceration, normal color, no pre-ulcer, no ulcer and no callus.     Toe Exam: ROM and strength within normal limits.     Sensory   Monofilament testing: intact    Vascular  Capillary refills: < 3 seconds  The left DP pulse is 2+.     Assign Risk Category  No deformity present  No loss of protective sensation  No weak pulses  Risk: 0

## 2025-03-25 NOTE — PATIENT INSTRUCTIONS
"Patient Education     Type 2 diabetes   The Basics   Written by the doctors and editors at [facility]   What is type 2 diabetes? -- This is a disorder that disrupts the way the body uses sugar. It is sometimes called type 2 diabetes mellitus.  All of the cells in the body need sugar to work normally. Sugar gets into the cells with the help of a hormone called insulin. Insulin is made by the pancreas, an organ in the belly. If there is not enough insulin, or if cells in the body don't respond normally to insulin, sugar builds up in the blood. That is what happens to people with diabetes.  There are 2 different types of diabetes:   In type 1 diabetes, the pancreas makes little or no insulin.   In type 2 diabetes, the pancreas still makes some insulin, but the cells in the body stop responding normally. Eventually, the pancreas cannot make enough insulin to keep up.  Having excess body weight or obesity increases a person's risk of developing type 2 diabetes. But people without excess body weight can get diabetes, too.  What are the symptoms of type 2 diabetes? -- Type 2 diabetes usually causes no symptoms. When symptoms do happen, they include:   Needing to urinate often   Intense thirst   Blurry vision  Can diabetes lead to other health problems? -- Yes. Type 2 diabetes might not make you feel sick. But if it is not managed, it can lead to serious problems over time, such as:   Heart attacks   Strokes   Kidney disease   Vision problems (or even blindness)   Pain or loss of feeling in the hands and feet   Needing to have fingers, toes, or other body parts removed (amputated)  How do I know if I have type 2 diabetes? -- Your doctor or nurse can do a blood test. There are 2 tests that can be used for this. Both involve measuring the amount of sugar in your blood, called your \"blood sugar\" or \"blood glucose\":   One of the tests measures your blood sugar at the time the blood sample is taken. This test is done in the " "morning. You can't eat or drink anything except water for at least 8 hours before the test.   The other test shows what your average blood sugar has been for the past 2 to 3 months. This blood test is called \"hemoglobin A1C\" or just \"A1C.\" It can be checked at any time of the day, even if you have recently eaten.  How is type 2 diabetes treated? -- The goals of treatment are to manage your blood sugar and lower the risk of future problems that can happen in people with diabetes.  Treatment might include:   Lifestyle changes - This is an important part of managing diabetes. It includes eating healthy foods and getting plenty of physical activity.   Medicines - There are a few medicines that help lower blood sugar. Some people need to take pills that help the body make more insulin or that help insulin do its job. Others need insulin shots.  Depending on what medicines you take, you might need to check your blood sugar regularly at home. But not everyone with type 2 diabetes needs to do this. Your doctor or nurse will tell you if you should be checking your blood sugar, and when and how to do this.  Sometimes, people with type 2 diabetes also need medicines to help prevent problems caused by the disease. For instance, medicines used to lower blood pressure can reduce the chances of a heart attack or stroke.   General medical care - It's also important to take care of other areas of your health. This includes watching your blood pressure and cholesterol levels. You should also get certain vaccines, such as vaccines to protect against the flu and coronavirus disease 2019 (\"COVID-19\"). Some people also need a vaccine to prevent pneumonia.  Can type 2 diabetes be prevented? -- Yes. To lower your chances of getting type 2 diabetes, the most important thing you can do is eat a healthy diet and get plenty of physical activity. This can help you lose weight if you are overweight. But eating well and being active are also good " for your overall health. Even gentle activity, like walking, has benefits.  If you smoke, quitting can also lower your risk of type 2 diabetes. Quitting smoking can be difficult, but your doctor or nurse can help.  All topics are updated as new evidence becomes available and our peer review process is complete.  This topic retrieved from [employer] on: Apr 24, 2024.  Topic 90113 Version 23.0  Release: 32.3.2 - C32.113  © 2024 UpToDate, Inc. and/or its affiliates. All rights reserved.  Consumer Information Use and Disclaimer   Disclaimer: This generalized information is a limited summary of diagnosis, treatment, and/or medication information. It is not meant to be comprehensive and should be used as a tool to help the user understand and/or assess potential diagnostic and treatment options. It does NOT include all information about conditions, treatments, medications, side effects, or risks that may apply to a specific patient. It is not intended to be medical advice or a substitute for the medical advice, diagnosis, or treatment of a health care provider based on the health care provider's examination and assessment of a patient's specific and unique circumstances. Patients must speak with a health care provider for complete information about their health, medical questions, and treatment options, including any risks or benefits regarding use of medications. This information does not endorse any treatments or medications as safe, effective, or approved for treating a specific patient. UpToDate, Inc. and its affiliates disclaim any warranty or liability relating to this information or the use thereof.The use of this information is governed by the Terms of Use, available at https://www.wolterskluwer.com/en/know/clinical-effectiveness-terms. 2024© UpToDate, Inc. and its affiliates and/or licensors. All rights reserved.  Copyright   © 2024 UpToDate, Inc. and/or its affiliates. All rights reserved.

## 2025-03-25 NOTE — ASSESSMENT & PLAN NOTE
Recent lipid panel has increased from previous readings.  Per patient, is taking atorvastatin 20 mg as prescribed, but notes difficulty in scoring his 40 mg tablet.  At this time, will increase atorvastatin from 20 mg to 30 mg.  Advised to repeat blood work in 3 months.    Orders:    Comprehensive metabolic panel; Future    Lipid Panel with Direct LDL reflex; Future    atorvastatin (LIPITOR) 10 mg tablet; Take 1 tablet (10 mg total) by mouth daily    atorvastatin (LIPITOR) 20 mg tablet; Take 1 tablet (20 mg total) by mouth daily

## 2025-04-17 DIAGNOSIS — F41.0 GENERALIZED ANXIETY DISORDER WITH PANIC ATTACKS: ICD-10-CM

## 2025-04-17 DIAGNOSIS — F41.1 GENERALIZED ANXIETY DISORDER WITH PANIC ATTACKS: ICD-10-CM

## 2025-04-18 RX ORDER — ESCITALOPRAM OXALATE 5 MG/1
5 TABLET ORAL DAILY
Qty: 90 TABLET | Refills: 1 | Status: SHIPPED | OUTPATIENT
Start: 2025-04-18

## 2025-05-01 ENCOUNTER — OFFICE VISIT (OUTPATIENT)
Dept: OTOLARYNGOLOGY | Facility: CLINIC | Age: 70
End: 2025-05-01
Payer: COMMERCIAL

## 2025-05-01 VITALS
OXYGEN SATURATION: 95 % | HEART RATE: 78 BPM | BODY MASS INDEX: 31.92 KG/M2 | TEMPERATURE: 95.9 F | WEIGHT: 223 LBS | HEIGHT: 70 IN

## 2025-05-01 DIAGNOSIS — H69.91 DYSFUNCTION OF RIGHT EUSTACHIAN TUBE: ICD-10-CM

## 2025-05-01 DIAGNOSIS — Z96.22 HISTORY OF TYMPANOSTOMY TUBE PLACEMENT: Primary | ICD-10-CM

## 2025-05-01 PROCEDURE — 99213 OFFICE O/P EST LOW 20 MIN: CPT | Performed by: OTOLARYNGOLOGY

## 2025-05-01 NOTE — PROGRESS NOTES
"Assessment/Plan:  Right PE tube in place and patent.  F/u in 6 months.      Diagnosis ICD-10-CM Associated Orders   1. History of tympanostomy tube placement  Z96.22       2. Dysfunction of right eustachian tube  H69.91              Subjective:      Patient ID: Ladarius Tabares is a 69 y.o. male.    F/u for right PE tube.  No c/o.        The following portions of the patient's history were reviewed and updated as appropriate: allergies, current medications, past family history, past medical history, past social history, past surgical history and problem list.    Review of Systems      Objective:      Pulse 78   Temp (!) 95.9 °F (35.5 °C) (Core)   Ht 5' 10\" (1.778 m)   Wt 101 kg (223 lb)   SpO2 95%   BMI 32.00 kg/m²          Physical Exam  Constitutional:       Appearance: He is well-developed.   HENT:      Head: Normocephalic and atraumatic.      Right Ear: Ear canal and external ear normal. No drainage. No middle ear effusion. A PE tube is present.      Left Ear: Tympanic membrane, ear canal and external ear normal. No drainage.  No middle ear effusion.      Nose: Nose normal.      Mouth/Throat:      Pharynx: Uvula midline. No oropharyngeal exudate.      Tonsils: 1+ on the right. 1+ on the left.   Neck:      Thyroid: No thyroid mass or thyromegaly.      Trachea: Trachea normal. No tracheal deviation.   Lymphadenopathy:      Cervical: No cervical adenopathy.   Neurological:      Mental Status: He is alert.         "

## 2025-06-17 ENCOUNTER — RA CDI HCC (OUTPATIENT)
Dept: OTHER | Facility: HOSPITAL | Age: 70
End: 2025-06-17

## 2025-06-19 ENCOUNTER — APPOINTMENT (OUTPATIENT)
Age: 70
End: 2025-06-19
Attending: NURSE PRACTITIONER
Payer: COMMERCIAL

## 2025-06-19 DIAGNOSIS — E11.9 TYPE 2 DIABETES MELLITUS WITHOUT COMPLICATION, WITHOUT LONG-TERM CURRENT USE OF INSULIN (HCC): ICD-10-CM

## 2025-06-19 DIAGNOSIS — E78.2 MIXED HYPERLIPIDEMIA: ICD-10-CM

## 2025-06-19 LAB
ALBUMIN SERPL BCG-MCNC: 4.6 G/DL (ref 3.5–5)
ALP SERPL-CCNC: 108 U/L (ref 34–104)
ALT SERPL W P-5'-P-CCNC: 29 U/L (ref 7–52)
ANION GAP SERPL CALCULATED.3IONS-SCNC: 9 MMOL/L (ref 4–13)
AST SERPL W P-5'-P-CCNC: 22 U/L (ref 13–39)
BILIRUB SERPL-MCNC: 1.29 MG/DL (ref 0.2–1)
BUN SERPL-MCNC: 21 MG/DL (ref 5–25)
CALCIUM SERPL-MCNC: 9.4 MG/DL (ref 8.4–10.2)
CHLORIDE SERPL-SCNC: 104 MMOL/L (ref 96–108)
CHOLEST SERPL-MCNC: 98 MG/DL (ref ?–200)
CO2 SERPL-SCNC: 28 MMOL/L (ref 21–32)
CREAT SERPL-MCNC: 1.26 MG/DL (ref 0.6–1.3)
EST. AVERAGE GLUCOSE BLD GHB EST-MCNC: 157 MG/DL
GFR SERPL CREATININE-BSD FRML MDRD: 57 ML/MIN/1.73SQ M
GLUCOSE P FAST SERPL-MCNC: 112 MG/DL (ref 65–99)
HBA1C MFR BLD: 7.1 %
HDLC SERPL-MCNC: 28 MG/DL
LDLC SERPL CALC-MCNC: 40 MG/DL (ref 0–100)
POTASSIUM SERPL-SCNC: 4.7 MMOL/L (ref 3.5–5.3)
PROT SERPL-MCNC: 7 G/DL (ref 6.4–8.4)
SODIUM SERPL-SCNC: 141 MMOL/L (ref 135–147)
TRIGL SERPL-MCNC: 151 MG/DL (ref ?–150)

## 2025-06-19 PROCEDURE — 36415 COLL VENOUS BLD VENIPUNCTURE: CPT

## 2025-06-19 PROCEDURE — 83036 HEMOGLOBIN GLYCOSYLATED A1C: CPT

## 2025-06-19 PROCEDURE — 80061 LIPID PANEL: CPT

## 2025-06-19 PROCEDURE — 80053 COMPREHEN METABOLIC PANEL: CPT

## 2025-06-20 ENCOUNTER — RESULTS FOLLOW-UP (OUTPATIENT)
Dept: FAMILY MEDICINE CLINIC | Facility: CLINIC | Age: 70
End: 2025-06-20

## 2025-06-25 ENCOUNTER — OFFICE VISIT (OUTPATIENT)
Dept: FAMILY MEDICINE CLINIC | Facility: CLINIC | Age: 70
End: 2025-06-25
Payer: COMMERCIAL

## 2025-06-25 VITALS
DIASTOLIC BLOOD PRESSURE: 70 MMHG | BODY MASS INDEX: 29.86 KG/M2 | OXYGEN SATURATION: 98 % | HEIGHT: 70 IN | HEART RATE: 74 BPM | SYSTOLIC BLOOD PRESSURE: 118 MMHG | RESPIRATION RATE: 18 BRPM | WEIGHT: 208.6 LBS

## 2025-06-25 DIAGNOSIS — E78.2 MIXED HYPERLIPIDEMIA: Chronic | ICD-10-CM

## 2025-06-25 DIAGNOSIS — E11.9 TYPE 2 DIABETES MELLITUS WITHOUT COMPLICATION, WITHOUT LONG-TERM CURRENT USE OF INSULIN (HCC): Primary | ICD-10-CM

## 2025-06-25 PROCEDURE — 99214 OFFICE O/P EST MOD 30 MIN: CPT | Performed by: NURSE PRACTITIONER

## 2025-06-25 PROCEDURE — G2211 COMPLEX E/M VISIT ADD ON: HCPCS | Performed by: NURSE PRACTITIONER

## 2025-06-25 RX ORDER — METFORMIN HYDROCHLORIDE 750 MG/1
750 TABLET, EXTENDED RELEASE ORAL
Qty: 100 TABLET | Refills: 1 | Status: SHIPPED | OUTPATIENT
Start: 2025-06-25

## 2025-06-25 RX ORDER — METFORMIN HYDROCHLORIDE 750 MG/1
750 TABLET, EXTENDED RELEASE ORAL
Qty: 100 TABLET | Refills: 1 | Status: SHIPPED | OUTPATIENT
Start: 2025-06-25 | End: 2025-06-25

## 2025-06-25 NOTE — PATIENT INSTRUCTIONS
"Patient Education     Carb counting for adults with diabetes   The Basics   Written by the doctors and editors at [facility]   What is carb counting? -- This is a type of meal planning that many people with diabetes use. It is a way to figure out how many carbohydrates, or \"carbs,\" you eat.  The body breaks down the food we eat into 3 main types of nutrients: carbs, proteins, and fats. Carbs are sugars and starches that come from food. The body uses carbs for energy.  Why do I need to count carbs? -- People with diabetes need to pay attention to how many carbs they eat. This is because carbs raise your blood sugar level.  Carb counting helps you:   Choose the right amount of insulin to take before meals and snacks - If you take insulin before meals, the dose depends on several things, including how many carbs you plan to eat. (It also depends on how much you plan to exercise and your blood sugar level.)   Plan your meals and snacks for the day - You can use carb counting to figure out how many carbs to eat at each meal and snack. This helps you make sure that you eat the right amount over the entire day.   Keep your blood sugar levels well managed - Spreading out the carbs you eat over a whole day can help keep your blood sugar from getting too high. If you take insulin or another diabetes medicine that can cause low blood sugar, eating about the same amount of carbs at each meal every day also helps keep your blood sugar from getting too low. Reducing the amount of carbs you eat can help you manage your diabetes better and prevent medical problems that diabetes can cause.  Your doctor, nurse, or dietitian (food expert) can help you figure out how many carbs to try to eat each day. This will depend on your eating habits, weight, activity level, and which diabetes medicines you take.  People who take insulin before meals might need to be very careful when they count the carbs in every meal and snack. This is so they " "can give themselves the right amount of insulin. If the insulin dose doesn't match the amount of carbs, their blood sugar might get too low or too high. Other people might be able to be a little more flexible as long as they get about the same amount of carbs at each meal or throughout the day.  Which foods have carbs? -- Foods with a lot of carbs include:   Grains - These include bread, pasta, rice, and cereal.   Fruits and starchy vegetables - Starchy vegetables include potatoes, corn, and squash.   Milk and other dairy products - Dairy products include cheese and yogurt.   Foods with added sugar - These include sweets and baked goods likes cookies and cakes, as well as sugary drinks like juice and soda.  It is best to get most of your carbs from fruits, vegetables, whole grains (like whole-wheat bread, whole-grain cereals, and brown rice), and low-fat milk and dairy products.  How do I count carbs? -- To count carbs in packaged foods, check the food's nutrition label (if it has one).  On the label (figure 1), check for:   \"Total Carbohydrate\" number - This tells you how many carbs are in 1 serving size of the food. If you eat 1 serving, then the number of carbs you eat is the same as the number of total carbohydrates.   \"Serving size\" - This tells you how much food is in 1 serving. If you have 2 servings, the number of carbs will be 2 times the number of carbohydrates listed.   \"Dietary Fiber\" - Fiber is a carb that is not digested, which means that it does not raise blood sugar. Foods with a lot of fiber can help manage your blood sugar. If a food has more than 5 grams (g) of fiber, you need less insulin to cover the total carbs in that food. So, if you are calculating an insulin dose, only count the carbs that are not from fiber (figure 1).  What is exchange planning? -- Exchange planning, or the \"exchange system,\" is a way for people to plan their meals without reading labels. This can be helpful since many " "foods don't come with a nutrition label.  The exchange system involves knowing how much of different foods have about 15 grams of carbs (table 1 and table 2 and table 3). Your doctor, nurse, or dietitian gives you a certain number of \"carb choices\" to eat with each meal and snack (table 4). Each \"choice\" is a portion of food that has about 15 grams of carbs. Knowing your options makes it easier to \"exchange\" 1 carb choice for another as you plan your meals and snacks. For example, 1 small apple could be exchanged for 1/3 cup of pasta.  How can I plan my meals? -- First, make sure that you know how many carbs you should be eating each day. Ask your doctor, nurse, or dietitian if you are not sure.  Here are some tips that might help:   Spread out your carbs over 4 to 6 small meals each day instead of 3 big ones.   Eat a similar number of carbs at each meal, for example, at each dinner.   Eat your meals at a similar time each day.   Plan your meals ahead of time.   Use the \"plate method.\" This is a simpler way to make sure that you get a good balance of carbs and other nutrients with each meal. It is not as exact as counting all of your carbs, but it can be helpful for people who prefer a simpler approach. If you take insulin before meals, it is generally better to adjust your insulin dose by counting how many carbs you plan to eat or using the exchange planning strategy.  For the plate method, you start with a plate about 9 inches (23 cm) across. Fill it with (figure 2):   1/2 non-starchy vegetables   1/4 protein   1/4 carbs   Follow your doctor's instructions for how and when to check your blood sugar. This can help you learn how certain foods affect your blood sugar.   Keep track of your meals and blood sugar levels. Show this to your doctor or nurse so they can adjust your treatment if needed. If you take insulin, you will also need to keep track of your exercise patterns and how much insulin you give yourself with " "each dose.   If you take insulin, make sure that you understand how to use it. This includes knowing how to adjust the dose based on your blood sugar level and what you plan to eat. Foods that have a lot of protein or fat also can affect your blood sugar level. Some people need to adjust their insulin doses when they eat these foods.   Remember that other things besides carbs can raise or lower your blood sugar level. These things can include exercise, getting sick, drinking alcohol, traveling, and stress. If you take insulin, make sure that you know how and when to adjust your dose in these situations.  If you are having trouble counting carbs or managing your blood sugar, talk to your doctor or nurse. They can help. A dietitian can also help you plan specific menus that will give you the right amount of carbs each day.  For more information, you can also get a book on counting carbs or check the American Diabetes Association website (www.diabetes.org).  All topics are updated as new evidence becomes available and our peer review process is complete.  This topic retrieved from [employer] on: Mar 27, 2024.  Topic 27737 Version 11.0  Release: 32.2.4 - C32.85  © 2024 UpToDate, Inc. and/or its affiliates. All rights reserved.  figure 1: Counting carbohydrates     To figure out the \"carb count\" in 1 serving, start with the number of grams of total carbohydrates (46 grams), then subtract the number of grams of dietary fiber (7 grams). It's also important to look at the serving size. In this example, the carb count is 39 grams. You can use this number when counting carbs for your insulin dose.  Graphic 95248 Version 8.0  table 1: Bread and grains with 15 grams of carbs*  Bread    Food  Serving size    Bagel 1/4 large bagel (1 oz)   Biscuit 1 biscuit (2.5 inches across)   Bread, reduced calorie, light 2 slices (1.5 oz)   Cornbread 1.75 inch cube (1.5 oz)   English muffin 1/2 muffin   Hot dog or hamburger bun 1/2 bun (3/4 oz) " "  Naan, chapati, or roti 1 oz   Pancake 1 pancake (4 inches across, 1/4 inch thick)   Samantha (6 inches across) 1/2 samantha   Tortilla, corn 1 small tortilla (6 inches across)   Tortilla, flour (white or whole wheat) 1 small tortilla (6 inches across) or 1/3 large tortilla (10 inches across)   Waffle 1 waffle (4-inch square or 4 inches across)   Cereals and grains (including pasta and rice)    Food  Serving size (cooked)    Barley, couscous, millet, pasta (white or whole wheat, all shapes and sizes), polenta, quinoa (all colors), or rice (white, brown, and other colors and types) 1/3 cup   Bran cereal (twigs, buds, or flakes), shredded wheat (plain), or sugar-coated cereal 1/2 cup   Bulgur, kasha, tabbouleh (tabouli), or wild rice 1/2 cup   Granola cereal 1/4 cup   Hot cereal (oats, oatmeal, grits) 1/2 cup   Unsweetened, ready-to-eat cereal 3/4 cup   * For bread and grains, 15 grams of carbs is considered 1 serving or \"choice\" for people who need to count carbs.  Graphic 872046 Version 1.0  table 2: Fruits with 15 grams of carbs*  Food  Serving size    Applesauce, unsweetened 1/2 cup   Banana 1 extra small banana, about 4 inches long (4 oz)   Blueberries 3/4 cup   Dried fruits (blueberries, cherries, cranberries, mixed fruit, raisins) 2 tbsp   Fruit, canned 1/2 cup   Fruit, whole, small (apple) 1 small fruit (4 oz)   Fruit, whole, medium (nectarine, orange, pear, tangerine) 1 medium fruit (6 oz)   Fruit juice, unsweetened 1/2 cup   Grapes 17 small grapes (3 oz)   Melon, diced 1 cup   Strawberries, whole 1 and 1/4 cups   When listed, weight (oz) includes skin and seeds. If you are not sure if your fruit is the right size for 1 serving, you can use a food scale to check the weight.  * For fruits, 15 grams of carbs is considered 1 serving or \"choice\" for people who need to count carbs.  Graphic 042017 Version 1.0  table 3: Starchy vegetables with 15 grams of carbs*  Food  Serving size (cooked)    Cassava, dasheen, or " "plantain 1/3 cup   Corn, green peas, mixed vegetables, or parsnips 1/2 cup   Marinara, pasta, or spaghetti sauce 1/2 cup   Mixed vegetables (with corn or peas) 1 cup   Potato, baked with skin 1/4 large (3 oz)   Potato, Romanian-fried (oven-baked) 1 cup (2 oz)   Potato, mashed with milk and fat 1/2 cup   Squash, winter (acorn, butternut) 1 cup   Yam or sweet potato, plain 1/2 cup (3 and 1/2 oz)   If you are not sure if your vegetable is the right size for 1 serving, you can use a food scale to check the weight.  * For starchy vegetables, 15 grams of carbs is considered 1 serving or \"choice\" for people who need to count carbs.  Graphic 027176 Version 1.0  table 4: Sample exchange system meal plan  Time  Exchange pattern  Sample menu  Carbohydrate count (g)    8 am 3 carbohydrate group    2 starch 1 English muffin 30    1 fruit 1 1/4 c strawberries 15    1 protein group 1/4 c cottage cheese -    1 fat group 1 tsp margarine -      Total: 45    12 noon 4 carbohydrate group    2 starch 2 slices of bread 30    1 fruit 1 orange 15    1 vegetable 1 c salad -    1 milk 8 oz skim milk 12    3 protein group 3 oz chicken -    1 fat group 1 tbsp low fat fulton -      Total: 57    3 pm 1 carbohydrate group    1 fruit or 1 starch 1 apple or 6 crackers 15      Total: 15    6 pm 4 carbohydrate group    2 starch 1 c potato 30    1 fruit 1/2 c fruit salad 15    1 vegetable 1 c salad -    1 milk 8 oz skim milk 12    6 protein group 6 oz fish -    1 fat group 2 tbsp low fat salad dressing -      Total: 57    9 pm 1 carbohydrate group    1 starch 6 crackers 15    1 protein 2 tbsp peanut butter -      Total: 15    Graphic 15431 Version 3.0  figure 2: The \"plate method\"     For the plate method, you start with a plate about 9 inches (23 cm) across. Then fill it with 1/2 non-starchy vegetables, 1/4 protein, and 1/4 carbs.  Graphic 712368 Version 2.0  Consumer Information Use and Disclaimer   Disclaimer: This generalized information is a limited " summary of diagnosis, treatment, and/or medication information. It is not meant to be comprehensive and should be used as a tool to help the user understand and/or assess potential diagnostic and treatment options. It does NOT include all information about conditions, treatments, medications, side effects, or risks that may apply to a specific patient. It is not intended to be medical advice or a substitute for the medical advice, diagnosis, or treatment of a health care provider based on the health care provider's examination and assessment of a patient's specific and unique circumstances. Patients must speak with a health care provider for complete information about their health, medical questions, and treatment options, including any risks or benefits regarding use of medications. This information does not endorse any treatments or medications as safe, effective, or approved for treating a specific patient. UpTo[employer], Inc[employer] and its affiliates disclaim any warranty or liability relating to this information or the use thereof.The use of this information is governed by the Terms of Use, available at https://www.wolterskluwer.com/en/know/clinical-effectiveness-terms. 2024© UpToDate, Inc. and its affiliates and/or licensors. All rights reserved.  Copyright   © 2024 Up[employer]Date, [employer] and/or its affiliates. All rights reserved.

## 2025-06-25 NOTE — ASSESSMENT & PLAN NOTE
Recent lipid panel has improved with increase of atorvastatin.  To continue on atorvastatin 30 mg as prescribed.  To obtain repeat blood work prior to next visit.    Orders:    Comprehensive metabolic panel; Future    Lipid Panel with Direct LDL reflex; Future

## 2025-06-25 NOTE — PROGRESS NOTES
Name: Ladarius Tabares      : 1955      MRN: [id number]  Encounter Provider: FRANCK Hawkins  Encounter Date: 2025   Encounter department: [facility] [address]  :  Assessment & Plan  Type 2 diabetes mellitus without complication, without long-term current use of insulin (HCC)    Lab Results   Component Value Date    HGBA1C 7.1 (H) 2025     A1c has decreased significantly with initiation of metformin.  Just outside of goal.  At this time we will increase metformin from 500 mg to 750 mg.  Advised to continue with diet modifications.  To repeat blood work in 3 months.    Orders:    metFORMIN (GLUCOPHAGE-XR) 750 mg 24 hr tablet; Take 1 tablet (750 mg total) by mouth daily with dinner    TSH, 3rd generation with Free T4 reflex; Future    Hemoglobin A1C; Future    Comprehensive metabolic panel; Future    CBC and differential; Future    Albumin / creatinine urine ratio; Future    Lipid Panel with Direct LDL reflex; Future    Mixed hyperlipidemia  Recent lipid panel has improved with increase of atorvastatin.  To continue on atorvastatin 30 mg as prescribed.  To obtain repeat blood work prior to next visit.    Orders:    Comprehensive metabolic panel; Future    Lipid Panel with Direct LDL reflex; Future          Depression Screening and Follow-up Plan: Patient was screened for depression during today's encounter. They screened negative with a PHQ-2 score of 0.        History of Present Illness   Patient presents office for follow-up after recent diagnosis of diabetes.  Patient initiated on metformin.  Has been tolerating medication well.  Has been attempting to dietary changes by cutting out sugary drinks.  Patient denies symptoms related to elevated blood sugars.  Denies weakness, dizziness.      Review of Systems   Constitutional:  Negative for appetite change, fatigue and unexpected weight change.   HENT:  Negative for congestion, ear pain and sore throat.  "   Eyes:  Negative for photophobia and visual disturbance.   Respiratory:  Negative for cough and shortness of breath.    Cardiovascular:  Negative for chest pain and palpitations.   Gastrointestinal:  Negative for abdominal pain, constipation, diarrhea, nausea and vomiting.   Endocrine: Negative for polydipsia, polyphagia and polyuria.   Genitourinary:  Negative for decreased urine volume.   Musculoskeletal:  Negative for arthralgias and myalgias.   Skin:  Negative for color change and rash.   Neurological:  Negative for dizziness, weakness, light-headedness, numbness and headaches.   Psychiatric/Behavioral:  Negative for dysphoric mood. The patient is not nervous/anxious.        Objective   /70 (BP Location: Left arm, Patient Position: Sitting) Comment: verbalized reading to patient  Pulse 74   Resp 18   Ht 5' 10\" (1.778 m)   Wt 94.6 kg (208 lb 9.6 oz)   SpO2 98%   BMI 29.93 kg/m²      Physical Exam  Vitals reviewed.   Constitutional:       General: He is not in acute distress.     Appearance: Normal appearance. He is not ill-appearing.   HENT:      Head: Normocephalic and atraumatic.      Right Ear: External ear normal.      Left Ear: External ear normal.      Nose: Nose normal.      Mouth/Throat:      Mouth: Mucous membranes are moist.      Pharynx: Oropharynx is clear.     Eyes:      Pupils: Pupils are equal, round, and reactive to light.       Cardiovascular:      Rate and Rhythm: Normal rate and regular rhythm.      Pulses: Normal pulses.      Heart sounds: Normal heart sounds. No murmur heard.  Pulmonary:      Effort: Pulmonary effort is normal.      Breath sounds: Normal breath sounds.     Musculoskeletal:         General: Normal range of motion.      Cervical back: Normal range of motion and neck supple.     Skin:     General: Skin is warm and dry.     Neurological:      General: No focal deficit present.      Mental Status: He is alert and oriented to person, place, and time.     Psychiatric:  "        Mood and Affect: Mood normal.         Behavior: Behavior normal.

## 2025-07-21 ENCOUNTER — TELEPHONE (OUTPATIENT)
Dept: NEPHROLOGY | Facility: CLINIC | Age: 70
End: 2025-07-21

## 2025-07-29 ENCOUNTER — OFFICE VISIT (OUTPATIENT)
Dept: CARDIOLOGY CLINIC | Facility: CLINIC | Age: 70
End: 2025-07-29
Payer: COMMERCIAL

## 2025-07-29 VITALS
HEIGHT: 70 IN | BODY MASS INDEX: 29.93 KG/M2 | OXYGEN SATURATION: 95 % | SYSTOLIC BLOOD PRESSURE: 118 MMHG | DIASTOLIC BLOOD PRESSURE: 72 MMHG | HEART RATE: 63 BPM | RESPIRATION RATE: 16 BRPM

## 2025-07-29 DIAGNOSIS — I10 PRIMARY HYPERTENSION: ICD-10-CM

## 2025-07-29 DIAGNOSIS — I21.11 ST ELEVATION MYOCARDIAL INFARCTION INVOLVING RIGHT CORONARY ARTERY (HCC): Primary | ICD-10-CM

## 2025-07-29 DIAGNOSIS — I25.118 CORONARY ARTERY DISEASE INVOLVING NATIVE HEART WITH OTHER FORM OF ANGINA PECTORIS, UNSPECIFIED VESSEL OR LESION TYPE (HCC): ICD-10-CM

## 2025-07-29 DIAGNOSIS — I50.33 ACUTE ON CHRONIC DIASTOLIC (CONGESTIVE) HEART FAILURE (HCC): ICD-10-CM

## 2025-07-29 DIAGNOSIS — C49.A0 MALIGNANT GASTROINTESTINAL STROMAL TUMOR, UNSPECIFIED SITE (HCC): ICD-10-CM

## 2025-07-29 PROCEDURE — 99214 OFFICE O/P EST MOD 30 MIN: CPT | Performed by: INTERNAL MEDICINE

## 2025-07-29 PROCEDURE — 93000 ELECTROCARDIOGRAM COMPLETE: CPT | Performed by: INTERNAL MEDICINE

## 2025-07-30 ENCOUNTER — APPOINTMENT (OUTPATIENT)
Age: 70
End: 2025-07-30
Payer: COMMERCIAL

## 2025-07-30 DIAGNOSIS — N18.9 CHRONIC KIDNEY DISEASE-MINERAL AND BONE DISORDER: ICD-10-CM

## 2025-07-30 DIAGNOSIS — N18.31 STAGE 3A CHRONIC KIDNEY DISEASE (HCC): ICD-10-CM

## 2025-07-30 DIAGNOSIS — E83.9 CHRONIC KIDNEY DISEASE-MINERAL AND BONE DISORDER: ICD-10-CM

## 2025-07-30 DIAGNOSIS — M89.9 CHRONIC KIDNEY DISEASE-MINERAL AND BONE DISORDER: ICD-10-CM

## 2025-07-30 LAB
25(OH)D3 SERPL-MCNC: 47.4 NG/ML (ref 30–100)
ANION GAP SERPL CALCULATED.3IONS-SCNC: 9 MMOL/L (ref 4–13)
BASOPHILS # BLD AUTO: 0.11 THOUSANDS/ÂΜL (ref 0–0.1)
BASOPHILS NFR BLD AUTO: 1 % (ref 0–1)
BILIRUB UR QL STRIP: NEGATIVE
BUN SERPL-MCNC: 26 MG/DL (ref 5–25)
CALCIUM SERPL-MCNC: 9.9 MG/DL (ref 8.4–10.2)
CHLORIDE SERPL-SCNC: 106 MMOL/L (ref 96–108)
CLARITY UR: CLEAR
CO2 SERPL-SCNC: 28 MMOL/L (ref 21–32)
COLOR UR: NORMAL
CREAT SERPL-MCNC: 1.13 MG/DL (ref 0.6–1.3)
EOSINOPHIL # BLD AUTO: 0.4 THOUSAND/ÂΜL (ref 0–0.61)
EOSINOPHIL NFR BLD AUTO: 4 % (ref 0–6)
ERYTHROCYTE [DISTWIDTH] IN BLOOD BY AUTOMATED COUNT: 14.2 % (ref 11.6–15.1)
GFR SERPL CREATININE-BSD FRML MDRD: 64 ML/MIN/1.73SQ M
GLUCOSE P FAST SERPL-MCNC: 105 MG/DL (ref 65–99)
GLUCOSE UR STRIP-MCNC: NEGATIVE MG/DL
HCT VFR BLD AUTO: 47.4 % (ref 36.5–49.3)
HGB BLD-MCNC: 15.4 G/DL (ref 12–17)
HGB UR QL STRIP.AUTO: NEGATIVE
IMM GRANULOCYTES # BLD AUTO: 0.04 THOUSAND/UL (ref 0–0.2)
IMM GRANULOCYTES NFR BLD AUTO: 0 % (ref 0–2)
KETONES UR STRIP-MCNC: NEGATIVE MG/DL
LEUKOCYTE ESTERASE UR QL STRIP: NEGATIVE
LYMPHOCYTES # BLD AUTO: 3.4 THOUSANDS/ÂΜL (ref 0.6–4.47)
LYMPHOCYTES NFR BLD AUTO: 31 % (ref 14–44)
MCH RBC QN AUTO: 32.2 PG (ref 26.8–34.3)
MCHC RBC AUTO-ENTMCNC: 32.5 G/DL (ref 31.4–37.4)
MCV RBC AUTO: 99 FL (ref 82–98)
MONOCYTES # BLD AUTO: 1.67 THOUSAND/ÂΜL (ref 0.17–1.22)
MONOCYTES NFR BLD AUTO: 15 % (ref 4–12)
NEUTROPHILS # BLD AUTO: 5.24 THOUSANDS/ÂΜL (ref 1.85–7.62)
NEUTS SEG NFR BLD AUTO: 49 % (ref 43–75)
NITRITE UR QL STRIP: NEGATIVE
NRBC BLD AUTO-RTO: 0 /100 WBCS
PH UR STRIP.AUTO: 5.5 [PH]
PHOSPHATE SERPL-MCNC: 3.6 MG/DL (ref 2.3–4.1)
PLATELET # BLD AUTO: 437 THOUSANDS/UL (ref 149–390)
PMV BLD AUTO: 10.2 FL (ref 8.9–12.7)
POTASSIUM SERPL-SCNC: 5.8 MMOL/L (ref 3.5–5.3)
PROT UR STRIP-MCNC: NEGATIVE MG/DL
PTH-INTACT SERPL-MCNC: 26.2 PG/ML (ref 12–88)
RBC # BLD AUTO: 4.79 MILLION/UL (ref 3.88–5.62)
SODIUM SERPL-SCNC: 143 MMOL/L (ref 135–147)
SP GR UR STRIP.AUTO: 1.02 (ref 1–1.03)
UROBILINOGEN UR STRIP-ACNC: <2 MG/DL
WBC # BLD AUTO: 10.86 THOUSAND/UL (ref 4.31–10.16)

## 2025-07-30 PROCEDURE — 85025 COMPLETE CBC W/AUTO DIFF WBC: CPT

## 2025-07-30 PROCEDURE — 80048 BASIC METABOLIC PNL TOTAL CA: CPT

## 2025-07-30 PROCEDURE — 36415 COLL VENOUS BLD VENIPUNCTURE: CPT

## 2025-07-30 PROCEDURE — 81003 URINALYSIS AUTO W/O SCOPE: CPT

## 2025-07-30 PROCEDURE — 82570 ASSAY OF URINE CREATININE: CPT

## 2025-07-30 PROCEDURE — 82306 VITAMIN D 25 HYDROXY: CPT

## 2025-07-30 PROCEDURE — 83970 ASSAY OF PARATHORMONE: CPT

## 2025-07-30 PROCEDURE — 84156 ASSAY OF PROTEIN URINE: CPT

## 2025-07-30 PROCEDURE — 84100 ASSAY OF PHOSPHORUS: CPT

## 2025-07-31 ENCOUNTER — TELEPHONE (OUTPATIENT)
Dept: NEPHROLOGY | Facility: CLINIC | Age: 70
End: 2025-07-31

## 2025-07-31 DIAGNOSIS — N18.31 STAGE 3A CHRONIC KIDNEY DISEASE (HCC): Primary | ICD-10-CM

## 2025-07-31 LAB
CREAT UR-MCNC: 168.5 MG/DL
PROT UR-MCNC: 12.2 MG/DL
PROT/CREAT UR: 0.1 MG/G{CREAT}

## 2025-08-01 ENCOUNTER — APPOINTMENT (OUTPATIENT)
Age: 70
End: 2025-08-01
Payer: COMMERCIAL

## 2025-08-01 DIAGNOSIS — N18.31 STAGE 3A CHRONIC KIDNEY DISEASE (HCC): ICD-10-CM

## 2025-08-01 LAB
ANION GAP SERPL CALCULATED.3IONS-SCNC: 9 MMOL/L (ref 4–13)
BUN SERPL-MCNC: 24 MG/DL (ref 5–25)
CALCIUM SERPL-MCNC: 9.3 MG/DL (ref 8.4–10.2)
CHLORIDE SERPL-SCNC: 106 MMOL/L (ref 96–108)
CO2 SERPL-SCNC: 26 MMOL/L (ref 21–32)
CREAT SERPL-MCNC: 1.29 MG/DL (ref 0.6–1.3)
GFR SERPL CREATININE-BSD FRML MDRD: 55 ML/MIN/1.73SQ M
GLUCOSE P FAST SERPL-MCNC: 102 MG/DL (ref 65–99)
POTASSIUM SERPL-SCNC: 4.8 MMOL/L (ref 3.5–5.3)
SODIUM SERPL-SCNC: 141 MMOL/L (ref 135–147)

## 2025-08-01 PROCEDURE — 36415 COLL VENOUS BLD VENIPUNCTURE: CPT

## 2025-08-01 PROCEDURE — 80048 BASIC METABOLIC PNL TOTAL CA: CPT

## 2025-08-04 ENCOUNTER — OFFICE VISIT (OUTPATIENT)
Dept: NEPHROLOGY | Facility: CLINIC | Age: 70
End: 2025-08-04
Payer: COMMERCIAL

## 2025-08-04 VITALS
HEIGHT: 70 IN | TEMPERATURE: 97.4 F | OXYGEN SATURATION: 96 % | HEART RATE: 63 BPM | WEIGHT: 205 LBS | BODY MASS INDEX: 29.35 KG/M2 | DIASTOLIC BLOOD PRESSURE: 70 MMHG | RESPIRATION RATE: 18 BRPM | SYSTOLIC BLOOD PRESSURE: 120 MMHG

## 2025-08-04 DIAGNOSIS — N18.9 CHRONIC KIDNEY DISEASE-MINERAL AND BONE DISORDER: ICD-10-CM

## 2025-08-04 DIAGNOSIS — M89.9 CHRONIC KIDNEY DISEASE-MINERAL AND BONE DISORDER: ICD-10-CM

## 2025-08-04 DIAGNOSIS — N18.31 STAGE 3A CHRONIC KIDNEY DISEASE (HCC): Primary | ICD-10-CM

## 2025-08-04 DIAGNOSIS — E83.9 CHRONIC KIDNEY DISEASE-MINERAL AND BONE DISORDER: ICD-10-CM

## 2025-08-04 DIAGNOSIS — R73.03 PREDIABETES: ICD-10-CM

## 2025-08-04 DIAGNOSIS — I50.20 SYSTOLIC CONGESTIVE HEART FAILURE, UNSPECIFIED HF CHRONICITY (HCC): ICD-10-CM

## 2025-08-04 DIAGNOSIS — I10 PRIMARY HYPERTENSION: ICD-10-CM

## 2025-08-04 PROCEDURE — 99214 OFFICE O/P EST MOD 30 MIN: CPT | Performed by: INTERNAL MEDICINE

## 2025-08-13 ENCOUNTER — HOSPITAL ENCOUNTER (OUTPATIENT)
Dept: NON INVASIVE DIAGNOSTICS | Facility: CLINIC | Age: 70
Discharge: HOME | End: 2025-08-13
Attending: PHYSICIAN ASSISTANT
Payer: COMMERCIAL

## (undated) DEVICE — INTEGRA® KNIFE 1411050 10PK MYRINGOTOMY LANCE: Brand: INTEGRA®

## (undated) DEVICE — TUBING SUCTION 5MM X 12 FT

## (undated) DEVICE — TOWEL SET X-RAY

## (undated) DEVICE — SPONGE RAYTEX

## (undated) DEVICE — 1200CC GUARDIAN II: Brand: GUARDIAN

## (undated) DEVICE — SYRINGE 3ML LL

## (undated) DEVICE — MAYO STAND COVER: Brand: CONVERTORS

## (undated) DEVICE — COTTON BALLS: Brand: DEROYAL

## (undated) DEVICE — GLOVE SRG BIOGEL 7